# Patient Record
Sex: FEMALE | Race: WHITE | NOT HISPANIC OR LATINO | Employment: OTHER | URBAN - METROPOLITAN AREA
[De-identification: names, ages, dates, MRNs, and addresses within clinical notes are randomized per-mention and may not be internally consistent; named-entity substitution may affect disease eponyms.]

---

## 2017-02-23 ENCOUNTER — ANESTHESIA EVENT (OUTPATIENT)
Dept: PERIOP | Facility: HOSPITAL | Age: 67
DRG: 470 | End: 2017-02-23
Payer: COMMERCIAL

## 2017-02-23 ENCOUNTER — APPOINTMENT (OUTPATIENT)
Dept: LAB | Facility: HOSPITAL | Age: 67
End: 2017-02-23
Attending: ORTHOPAEDIC SURGERY
Payer: COMMERCIAL

## 2017-02-23 ENCOUNTER — APPOINTMENT (OUTPATIENT)
Dept: PREADMISSION TESTING | Facility: HOSPITAL | Age: 67
End: 2017-02-23
Payer: COMMERCIAL

## 2017-02-23 ENCOUNTER — TRANSCRIBE ORDERS (OUTPATIENT)
Dept: ADMINISTRATIVE | Facility: HOSPITAL | Age: 67
End: 2017-02-23

## 2017-02-23 ENCOUNTER — HOSPITAL ENCOUNTER (OUTPATIENT)
Dept: NON INVASIVE DIAGNOSTICS | Facility: HOSPITAL | Age: 67
Discharge: HOME | End: 2017-02-23
Attending: ORTHOPAEDIC SURGERY
Payer: COMMERCIAL

## 2017-02-23 ENCOUNTER — HOSPITAL ENCOUNTER (OUTPATIENT)
Dept: RADIOLOGY | Facility: HOSPITAL | Age: 67
Discharge: HOME | End: 2017-02-23
Attending: ORTHOPAEDIC SURGERY
Payer: COMMERCIAL

## 2017-02-23 VITALS
HEIGHT: 65 IN | WEIGHT: 189 LBS | SYSTOLIC BLOOD PRESSURE: 133 MMHG | DIASTOLIC BLOOD PRESSURE: 69 MMHG | BODY MASS INDEX: 31.49 KG/M2 | TEMPERATURE: 98 F | RESPIRATION RATE: 16 BRPM | HEART RATE: 72 BPM

## 2017-02-23 DIAGNOSIS — Z01.818 OTHER SPECIFIED PRE-OPERATIVE EXAMINATION: ICD-10-CM

## 2017-02-23 DIAGNOSIS — M17.12 PRIMARY OSTEOARTHRITIS OF LEFT KNEE: ICD-10-CM

## 2017-02-23 DIAGNOSIS — M17.12 PRIMARY OSTEOARTHRITIS OF LEFT KNEE: Primary | ICD-10-CM

## 2017-02-23 LAB
ABO GROUP BLD: NORMAL
ALBUMIN SERPL BCP-MCNC: 3.6 G/DL (ref 3.5–5)
ALP SERPL-CCNC: 121 U/L (ref 46–116)
ALT SERPL W P-5'-P-CCNC: 47 U/L (ref 12–78)
ANION GAP SERPL CALCULATED.3IONS-SCNC: 9 MMOL/L (ref 4–13)
AST SERPL W P-5'-P-CCNC: 19 U/L (ref 5–45)
BASOPHILS # BLD AUTO: 0.03 THOUSANDS/ΜL (ref 0–0.1)
BASOPHILS NFR BLD AUTO: 0 % (ref 0–1)
BILIRUB SERPL-MCNC: 0.58 MG/DL (ref 0.2–1)
BILIRUB UR QL STRIP: NEGATIVE
BLD GP AB SCN SERPL QL: NEGATIVE
BUN SERPL-MCNC: 15 MG/DL (ref 5–25)
CALCIUM SERPL-MCNC: 8.6 MG/DL (ref 8.3–10.1)
CHLORIDE SERPL-SCNC: 104 MMOL/L (ref 100–108)
CLARITY UR: CLEAR
CO2 SERPL-SCNC: 30 MMOL/L (ref 21–32)
COLOR UR: NORMAL
CREAT SERPL-MCNC: 0.77 MG/DL (ref 0.6–1.3)
EOSINOPHIL # BLD AUTO: 0.03 THOUSAND/ΜL (ref 0–0.61)
EOSINOPHIL NFR BLD AUTO: 0 % (ref 0–6)
ERYTHROCYTE [DISTWIDTH] IN BLOOD BY AUTOMATED COUNT: 14 % (ref 11.6–15.1)
GFR SERPL CREATININE-BSD FRML MDRD: >60 ML/MIN/1.73SQ M
GLUCOSE SERPL-MCNC: 103 MG/DL (ref 65–140)
GLUCOSE UR STRIP-MCNC: NEGATIVE MG/DL
HCT VFR BLD AUTO: 41.2 % (ref 34.8–46.1)
HGB BLD-MCNC: 14.4 G/DL (ref 11.5–15.4)
HGB UR QL STRIP.AUTO: NEGATIVE
INR PPP: 0.93 (ref 0.86–1.16)
KETONES UR STRIP-MCNC: NEGATIVE MG/DL
LEUKOCYTE ESTERASE UR QL STRIP: NEGATIVE
LYMPHOCYTES # BLD AUTO: 3.25 THOUSANDS/ΜL (ref 0.6–4.47)
LYMPHOCYTES NFR BLD AUTO: 25 % (ref 14–44)
MCH RBC QN AUTO: 33.3 PG (ref 26.8–34.3)
MCHC RBC AUTO-ENTMCNC: 35 G/DL (ref 31.4–37.4)
MCV RBC AUTO: 95 FL (ref 82–98)
MONOCYTES # BLD AUTO: 1.21 THOUSAND/ΜL (ref 0.17–1.22)
MONOCYTES NFR BLD AUTO: 9 % (ref 4–12)
NEUTROPHILS # BLD AUTO: 8.75 THOUSANDS/ΜL (ref 1.85–7.62)
NEUTS SEG NFR BLD AUTO: 66 % (ref 43–75)
NITRITE UR QL STRIP: NEGATIVE
NRBC BLD AUTO-RTO: 0 /100 WBCS
PH UR STRIP.AUTO: 6 [PH] (ref 4.5–8)
PLATELET # BLD AUTO: 262 THOUSANDS/UL (ref 149–390)
PMV BLD AUTO: 10.9 FL (ref 8.9–12.7)
POTASSIUM SERPL-SCNC: 3.5 MMOL/L (ref 3.5–5.3)
PROT SERPL-MCNC: 7 G/DL (ref 6.4–8.2)
PROT UR STRIP-MCNC: NEGATIVE MG/DL
PROTHROMBIN TIME: 12.5 SECONDS (ref 12–14.3)
RBC # BLD AUTO: 4.33 MILLION/UL (ref 3.81–5.12)
RH BLD: POSITIVE
SODIUM SERPL-SCNC: 143 MMOL/L (ref 136–145)
SP GR UR STRIP.AUTO: 1.01 (ref 1–1.03)
UROBILINOGEN UR QL STRIP.AUTO: 0.2 E.U./DL
WBC # BLD AUTO: 13.27 THOUSAND/UL (ref 4.31–10.16)

## 2017-02-23 PROCEDURE — 86900 BLOOD TYPING SEROLOGIC ABO: CPT

## 2017-02-23 PROCEDURE — 36415 COLL VENOUS BLD VENIPUNCTURE: CPT

## 2017-02-23 PROCEDURE — 86901 BLOOD TYPING SEROLOGIC RH(D): CPT

## 2017-02-23 PROCEDURE — 85610 PROTHROMBIN TIME: CPT

## 2017-02-23 PROCEDURE — 86850 RBC ANTIBODY SCREEN: CPT

## 2017-02-23 PROCEDURE — 93005 ELECTROCARDIOGRAM TRACING: CPT

## 2017-02-23 PROCEDURE — 81003 URINALYSIS AUTO W/O SCOPE: CPT | Performed by: ORTHOPAEDIC SURGERY

## 2017-02-23 PROCEDURE — 80053 COMPREHEN METABOLIC PANEL: CPT

## 2017-02-23 PROCEDURE — 85025 COMPLETE CBC W/AUTO DIFF WBC: CPT

## 2017-02-23 PROCEDURE — 71020 HB CHEST X-RAY 2VW FRONTAL&LATL: CPT

## 2017-02-23 RX ORDER — SODIUM CHLORIDE 9 MG/ML
125 INJECTION, SOLUTION INTRAVENOUS CONTINUOUS
Status: CANCELLED | OUTPATIENT
Start: 2017-02-23

## 2017-02-23 RX ORDER — AMLODIPINE BESYLATE 10 MG/1
10 TABLET ORAL DAILY
COMMUNITY
End: 2022-05-10 | Stop reason: HOSPADM

## 2017-02-24 LAB
ATRIAL RATE: 67 BPM
P AXIS: 35 DEGREES
PR INTERVAL: 140 MS
QRS AXIS: 5 DEGREES
QRSD INTERVAL: 78 MS
QT INTERVAL: 382 MS
QTC INTERVAL: 403 MS
T WAVE AXIS: 61 DEGREES
VENTRICULAR RATE: 67 BPM

## 2017-03-06 RX ORDER — TRANEXAMIC ACID 100 MG/ML
1000 INJECTION, SOLUTION INTRAVENOUS ONCE
Status: CANCELLED | OUTPATIENT
Start: 2017-03-06 | End: 2017-03-06

## 2017-03-07 ENCOUNTER — ANESTHESIA (OUTPATIENT)
Dept: PERIOP | Facility: HOSPITAL | Age: 67
DRG: 470 | End: 2017-03-07
Payer: COMMERCIAL

## 2017-03-07 ENCOUNTER — APPOINTMENT (INPATIENT)
Dept: RADIOLOGY | Facility: HOSPITAL | Age: 67
DRG: 470 | End: 2017-03-07
Payer: COMMERCIAL

## 2017-03-07 ENCOUNTER — HOSPITAL ENCOUNTER (INPATIENT)
Facility: HOSPITAL | Age: 67
LOS: 2 days | Discharge: NURSING, REHAB OR LONG-TERM CARE | DRG: 470 | End: 2017-03-09
Attending: ORTHOPAEDIC SURGERY | Admitting: ORTHOPAEDIC SURGERY
Payer: COMMERCIAL

## 2017-03-07 ENCOUNTER — APPOINTMENT (INPATIENT)
Dept: PHYSICAL THERAPY | Facility: HOSPITAL | Age: 67
DRG: 470 | End: 2017-03-07
Payer: COMMERCIAL

## 2017-03-07 DIAGNOSIS — M17.12 PRIMARY OSTEOARTHRITIS OF LEFT KNEE: Primary | ICD-10-CM

## 2017-03-07 LAB
GLUCOSE SERPL-MCNC: 101 MG/DL (ref 65–140)
GLUCOSE SERPL-MCNC: 116 MG/DL (ref 65–140)
GLUCOSE SERPL-MCNC: 126 MG/DL (ref 65–140)

## 2017-03-07 PROCEDURE — C1776 JOINT DEVICE (IMPLANTABLE): HCPCS | Performed by: ORTHOPAEDIC SURGERY

## 2017-03-07 PROCEDURE — 94762 N-INVAS EAR/PLS OXIMTRY CONT: CPT

## 2017-03-07 PROCEDURE — G8979 MOBILITY GOAL STATUS: HCPCS | Performed by: PHYSICAL THERAPIST

## 2017-03-07 PROCEDURE — 73560 X-RAY EXAM OF KNEE 1 OR 2: CPT

## 2017-03-07 PROCEDURE — 0SRD0J9 REPLACEMENT OF LEFT KNEE JOINT WITH SYNTHETIC SUBSTITUTE, CEMENTED, OPEN APPROACH: ICD-10-PCS | Performed by: ORTHOPAEDIC SURGERY

## 2017-03-07 PROCEDURE — C1713 ANCHOR/SCREW BN/BN,TIS/BN: HCPCS | Performed by: ORTHOPAEDIC SURGERY

## 2017-03-07 PROCEDURE — 97163 PT EVAL HIGH COMPLEX 45 MIN: CPT | Performed by: PHYSICAL THERAPIST

## 2017-03-07 PROCEDURE — G8978 MOBILITY CURRENT STATUS: HCPCS | Performed by: PHYSICAL THERAPIST

## 2017-03-07 PROCEDURE — 82948 REAGENT STRIP/BLOOD GLUCOSE: CPT

## 2017-03-07 DEVICE — ATTUNE KNEE SYSTEM TIBIAL INSERT FIXED BEARING POSTERIOR STABILIZED 5 5MM AOX
Type: IMPLANTABLE DEVICE | Site: KNEE | Status: FUNCTIONAL
Brand: ATTUNE

## 2017-03-07 DEVICE — SMARTSET GMV HIGH PERFORMANCE GENTAMICIN MEDIUM VISCOSITY BONE CEMENT 40G
Type: IMPLANTABLE DEVICE | Site: KNEE | Status: FUNCTIONAL
Brand: SMARTSET

## 2017-03-07 DEVICE — ATTUNE KNEE SYSTEM TIBIAL BASE FIXED BEARING SIZE 4 CEMENTED
Type: IMPLANTABLE DEVICE | Site: KNEE | Status: FUNCTIONAL
Brand: ATTUNE

## 2017-03-07 DEVICE — ATTUNE KNEE SYSTEM FEMORAL POSTERIOR STABILIZED SIZE 5 LEFT CEMENTED
Type: IMPLANTABLE DEVICE | Site: KNEE | Status: FUNCTIONAL
Brand: ATTUNE

## 2017-03-07 DEVICE — ATTUNE PATELLA MEDIALIZED ANATOMIC 35MM CEMENTED AOX
Type: IMPLANTABLE DEVICE | Site: KNEE | Status: FUNCTIONAL
Brand: ATTUNE

## 2017-03-07 RX ORDER — ONDANSETRON 2 MG/ML
4 INJECTION INTRAMUSCULAR; INTRAVENOUS EVERY 4 HOURS PRN
Status: DISPENSED | OUTPATIENT
Start: 2017-03-07 | End: 2017-03-08

## 2017-03-07 RX ORDER — DIPHENHYDRAMINE HYDROCHLORIDE 50 MG/ML
50 INJECTION INTRAMUSCULAR; INTRAVENOUS EVERY 6 HOURS PRN
Status: DISCONTINUED | OUTPATIENT
Start: 2017-03-07 | End: 2017-03-09 | Stop reason: HOSPADM

## 2017-03-07 RX ORDER — MORPHINE SULFATE 0.5 MG/ML
INJECTION, SOLUTION EPIDURAL; INTRATHECAL; INTRAVENOUS AS NEEDED
Status: DISCONTINUED | OUTPATIENT
Start: 2017-03-07 | End: 2017-03-07 | Stop reason: SURG

## 2017-03-07 RX ORDER — ONDANSETRON 2 MG/ML
INJECTION INTRAMUSCULAR; INTRAVENOUS AS NEEDED
Status: DISCONTINUED | OUTPATIENT
Start: 2017-03-07 | End: 2017-03-07 | Stop reason: SURG

## 2017-03-07 RX ORDER — OXYCODONE HYDROCHLORIDE AND ACETAMINOPHEN 5; 325 MG/1; MG/1
2 TABLET ORAL EVERY 4 HOURS PRN
Status: ACTIVE | OUTPATIENT
Start: 2017-03-07 | End: 2017-03-08

## 2017-03-07 RX ORDER — BISACODYL 10 MG
10 SUPPOSITORY, RECTAL RECTAL DAILY PRN
Status: DISCONTINUED | OUTPATIENT
Start: 2017-03-07 | End: 2017-03-09 | Stop reason: HOSPADM

## 2017-03-07 RX ORDER — DOCUSATE SODIUM 100 MG/1
100 CAPSULE, LIQUID FILLED ORAL 2 TIMES DAILY
Status: DISCONTINUED | OUTPATIENT
Start: 2017-03-07 | End: 2017-03-09 | Stop reason: HOSPADM

## 2017-03-07 RX ORDER — MAGNESIUM HYDROXIDE 1200 MG/15ML
LIQUID ORAL AS NEEDED
Status: DISCONTINUED | OUTPATIENT
Start: 2017-03-07 | End: 2017-03-07 | Stop reason: HOSPADM

## 2017-03-07 RX ORDER — CELECOXIB 200 MG/1
200 CAPSULE ORAL
Status: DISCONTINUED | OUTPATIENT
Start: 2017-03-07 | End: 2017-03-07 | Stop reason: HOSPADM

## 2017-03-07 RX ORDER — METHOCARBAMOL 500 MG/1
500 TABLET, FILM COATED ORAL EVERY 6 HOURS PRN
Status: DISCONTINUED | OUTPATIENT
Start: 2017-03-07 | End: 2017-03-09 | Stop reason: HOSPADM

## 2017-03-07 RX ORDER — SODIUM CHLORIDE 9 MG/ML
125 INJECTION, SOLUTION INTRAVENOUS CONTINUOUS
Status: DISCONTINUED | OUTPATIENT
Start: 2017-03-07 | End: 2017-03-09 | Stop reason: HOSPADM

## 2017-03-07 RX ORDER — ACETAMINOPHEN 325 MG/1
650 TABLET ORAL EVERY 6 HOURS PRN
Status: DISCONTINUED | OUTPATIENT
Start: 2017-03-07 | End: 2017-03-09 | Stop reason: HOSPADM

## 2017-03-07 RX ORDER — CELECOXIB 200 MG/1
200 CAPSULE ORAL DAILY
Status: DISCONTINUED | OUTPATIENT
Start: 2017-03-08 | End: 2017-03-09 | Stop reason: HOSPADM

## 2017-03-07 RX ORDER — OXYCODONE HYDROCHLORIDE 5 MG/1
5 TABLET ORAL EVERY 4 HOURS PRN
Status: DISCONTINUED | OUTPATIENT
Start: 2017-03-08 | End: 2017-03-09 | Stop reason: HOSPADM

## 2017-03-07 RX ORDER — ASPIRIN 325 MG
325 TABLET ORAL 2 TIMES DAILY
Status: DISCONTINUED | OUTPATIENT
Start: 2017-03-07 | End: 2017-03-09 | Stop reason: HOSPADM

## 2017-03-07 RX ORDER — ONDANSETRON 2 MG/ML
4 INJECTION INTRAMUSCULAR; INTRAVENOUS ONCE
Status: DISCONTINUED | OUTPATIENT
Start: 2017-03-07 | End: 2017-03-07 | Stop reason: HOSPADM

## 2017-03-07 RX ORDER — BUPIVACAINE HYDROCHLORIDE 7.5 MG/ML
INJECTION, SOLUTION INTRASPINAL AS NEEDED
Status: DISCONTINUED | OUTPATIENT
Start: 2017-03-07 | End: 2017-03-07 | Stop reason: SURG

## 2017-03-07 RX ORDER — METOCLOPRAMIDE HYDROCHLORIDE 5 MG/ML
5 INJECTION INTRAMUSCULAR; INTRAVENOUS EVERY 6 HOURS PRN
Status: ACTIVE | OUTPATIENT
Start: 2017-03-07 | End: 2017-03-08

## 2017-03-07 RX ORDER — TRAMADOL HYDROCHLORIDE 50 MG/1
50 TABLET ORAL EVERY 6 HOURS PRN
Status: DISCONTINUED | OUTPATIENT
Start: 2017-03-08 | End: 2017-03-09 | Stop reason: HOSPADM

## 2017-03-07 RX ORDER — VALACYCLOVIR HYDROCHLORIDE 500 MG/1
500 TABLET, FILM COATED ORAL 2 TIMES DAILY
Status: ON HOLD | COMMUNITY
End: 2017-03-07

## 2017-03-07 RX ORDER — ROPIVACAINE HYDROCHLORIDE 5 MG/ML
INJECTION, SOLUTION EPIDURAL; INFILTRATION; PERINEURAL AS NEEDED
Status: DISCONTINUED | OUTPATIENT
Start: 2017-03-07 | End: 2017-03-07 | Stop reason: SURG

## 2017-03-07 RX ORDER — ONDANSETRON 2 MG/ML
4 INJECTION INTRAMUSCULAR; INTRAVENOUS EVERY 8 HOURS PRN
Status: DISCONTINUED | OUTPATIENT
Start: 2017-03-07 | End: 2017-03-09 | Stop reason: HOSPADM

## 2017-03-07 RX ORDER — MIDAZOLAM HYDROCHLORIDE 1 MG/ML
INJECTION INTRAMUSCULAR; INTRAVENOUS AS NEEDED
Status: DISCONTINUED | OUTPATIENT
Start: 2017-03-07 | End: 2017-03-07 | Stop reason: SURG

## 2017-03-07 RX ORDER — ZOLPIDEM TARTRATE 5 MG/1
5 TABLET ORAL
Status: DISCONTINUED | OUTPATIENT
Start: 2017-03-07 | End: 2017-03-09 | Stop reason: HOSPADM

## 2017-03-07 RX ORDER — SODIUM CHLORIDE, SODIUM LACTATE, POTASSIUM CHLORIDE, CALCIUM CHLORIDE 600; 310; 30; 20 MG/100ML; MG/100ML; MG/100ML; MG/100ML
100 INJECTION, SOLUTION INTRAVENOUS CONTINUOUS
Status: DISCONTINUED | OUTPATIENT
Start: 2017-03-07 | End: 2017-03-09 | Stop reason: HOSPADM

## 2017-03-07 RX ORDER — PANTOPRAZOLE SODIUM 40 MG/1
40 TABLET, DELAYED RELEASE ORAL
Status: DISCONTINUED | OUTPATIENT
Start: 2017-03-08 | End: 2017-03-09 | Stop reason: HOSPADM

## 2017-03-07 RX ORDER — PROPOFOL 10 MG/ML
INJECTION, EMULSION INTRAVENOUS CONTINUOUS PRN
Status: DISCONTINUED | OUTPATIENT
Start: 2017-03-07 | End: 2017-03-07 | Stop reason: SURG

## 2017-03-07 RX ORDER — EPHEDRINE SULFATE 50 MG/ML
INJECTION, SOLUTION INTRAVENOUS AS NEEDED
Status: DISCONTINUED | OUTPATIENT
Start: 2017-03-07 | End: 2017-03-07 | Stop reason: SURG

## 2017-03-07 RX ORDER — CALCIUM CARBONATE 200(500)MG
1000 TABLET,CHEWABLE ORAL DAILY PRN
Status: DISCONTINUED | OUTPATIENT
Start: 2017-03-07 | End: 2017-03-09 | Stop reason: HOSPADM

## 2017-03-07 RX ORDER — TRANEXAMIC ACID 100 MG/ML
INJECTION, SOLUTION INTRAVENOUS AS NEEDED
Status: DISCONTINUED | OUTPATIENT
Start: 2017-03-07 | End: 2017-03-07 | Stop reason: HOSPADM

## 2017-03-07 RX ORDER — OXYCODONE HYDROCHLORIDE 10 MG/1
10 TABLET ORAL EVERY 4 HOURS PRN
Status: DISCONTINUED | OUTPATIENT
Start: 2017-03-08 | End: 2017-03-09 | Stop reason: HOSPADM

## 2017-03-07 RX ORDER — FUROSEMIDE 20 MG/1
20 TABLET ORAL DAILY
Status: DISCONTINUED | OUTPATIENT
Start: 2017-03-08 | End: 2017-03-09 | Stop reason: HOSPADM

## 2017-03-07 RX ORDER — ATORVASTATIN CALCIUM 10 MG/1
10 TABLET, FILM COATED ORAL
Status: DISCONTINUED | OUTPATIENT
Start: 2017-03-08 | End: 2017-03-09 | Stop reason: HOSPADM

## 2017-03-07 RX ORDER — NALOXONE HYDROCHLORIDE 0.4 MG/ML
0.04 INJECTION, SOLUTION INTRAMUSCULAR; INTRAVENOUS; SUBCUTANEOUS AS NEEDED
Status: DISCONTINUED | OUTPATIENT
Start: 2017-03-08 | End: 2017-03-09 | Stop reason: HOSPADM

## 2017-03-07 RX ORDER — DIPHENHYDRAMINE HYDROCHLORIDE 50 MG/ML
25 INJECTION INTRAMUSCULAR; INTRAVENOUS EVERY 6 HOURS PRN
Status: DISPENSED | OUTPATIENT
Start: 2017-03-07 | End: 2017-03-08

## 2017-03-07 RX ORDER — FENTANYL CITRATE 50 UG/ML
INJECTION, SOLUTION INTRAMUSCULAR; INTRAVENOUS AS NEEDED
Status: DISCONTINUED | OUTPATIENT
Start: 2017-03-07 | End: 2017-03-07 | Stop reason: SURG

## 2017-03-07 RX ORDER — AMLODIPINE BESYLATE 10 MG/1
10 TABLET ORAL DAILY
Status: DISCONTINUED | OUTPATIENT
Start: 2017-03-08 | End: 2017-03-09 | Stop reason: HOSPADM

## 2017-03-07 RX ORDER — MORPHINE SULFATE 2 MG/ML
2 INJECTION, SOLUTION INTRAMUSCULAR; INTRAVENOUS EVERY 2 HOUR PRN
Status: DISCONTINUED | OUTPATIENT
Start: 2017-03-08 | End: 2017-03-09 | Stop reason: HOSPADM

## 2017-03-07 RX ORDER — NALBUPHINE HCL 10 MG/ML
5 AMPUL (ML) INJECTION
Status: DISPENSED | OUTPATIENT
Start: 2017-03-07 | End: 2017-03-08

## 2017-03-07 RX ORDER — KETOROLAC TROMETHAMINE 30 MG/ML
15 INJECTION, SOLUTION INTRAMUSCULAR; INTRAVENOUS EVERY 6 HOURS PRN
Status: DISPENSED | OUTPATIENT
Start: 2017-03-07 | End: 2017-03-09

## 2017-03-07 RX ADMIN — SODIUM CHLORIDE: 0.9 INJECTION, SOLUTION INTRAVENOUS at 09:27

## 2017-03-07 RX ADMIN — SODIUM CHLORIDE 125 ML/HR: 0.9 INJECTION, SOLUTION INTRAVENOUS at 08:25

## 2017-03-07 RX ADMIN — ONDANSETRON 4 MG: 2 INJECTION INTRAMUSCULAR; INTRAVENOUS at 20:40

## 2017-03-07 RX ADMIN — FENTANYL CITRATE 100 MCG: 50 INJECTION, SOLUTION INTRAMUSCULAR; INTRAVENOUS at 08:39

## 2017-03-07 RX ADMIN — EPHEDRINE SULFATE 5 MG: 50 INJECTION, SOLUTION INTRAMUSCULAR; INTRAVENOUS; SUBCUTANEOUS at 09:45

## 2017-03-07 RX ADMIN — EPHEDRINE SULFATE 5 MG: 50 INJECTION, SOLUTION INTRAMUSCULAR; INTRAVENOUS; SUBCUTANEOUS at 09:55

## 2017-03-07 RX ADMIN — CEFAZOLIN SODIUM 2000 MG: 2 SOLUTION INTRAVENOUS at 09:01

## 2017-03-07 RX ADMIN — ROPIVACAINE HYDROCHLORIDE 30 ML: 5 INJECTION, SOLUTION EPIDURAL; INFILTRATION; PERINEURAL at 08:46

## 2017-03-07 RX ADMIN — PROPOFOL 80 MCG/KG/MIN: 10 INJECTION, EMULSION INTRAVENOUS at 09:01

## 2017-03-07 RX ADMIN — MIDAZOLAM HYDROCHLORIDE 2 MG: 1 INJECTION, SOLUTION INTRAMUSCULAR; INTRAVENOUS at 08:39

## 2017-03-07 RX ADMIN — DEXAMETHASONE SODIUM PHOSPHATE 4 MG: 10 INJECTION INTRAMUSCULAR; INTRAVENOUS at 09:00

## 2017-03-07 RX ADMIN — CEFAZOLIN SODIUM 1000 MG: 1 SOLUTION INTRAVENOUS at 19:11

## 2017-03-07 RX ADMIN — MIDAZOLAM HYDROCHLORIDE 2 MG: 1 INJECTION, SOLUTION INTRAMUSCULAR; INTRAVENOUS at 08:40

## 2017-03-07 RX ADMIN — SODIUM CHLORIDE, SODIUM LACTATE, POTASSIUM CHLORIDE, AND CALCIUM CHLORIDE 100 ML/HR: .6; .31; .03; .02 INJECTION, SOLUTION INTRAVENOUS at 15:29

## 2017-03-07 RX ADMIN — BUPIVACAINE HYDROCHLORIDE IN DEXTROSE 1.6 ML: 7.5 INJECTION, SOLUTION SUBARACHNOID at 09:00

## 2017-03-07 RX ADMIN — ONDANSETRON 4 MG: 2 INJECTION INTRAMUSCULAR; INTRAVENOUS at 13:45

## 2017-03-07 RX ADMIN — METHOCARBAMOL 500 MG: 500 TABLET ORAL at 19:18

## 2017-03-07 RX ADMIN — SODIUM CHLORIDE: 0.9 INJECTION, SOLUTION INTRAVENOUS at 10:06

## 2017-03-07 RX ADMIN — MORPHINE SULFATE 0.15 MG: 0.5 INJECTION, SOLUTION EPIDURAL; INTRATHECAL; INTRAVENOUS at 09:00

## 2017-03-07 RX ADMIN — SODIUM CHLORIDE, SODIUM LACTATE, POTASSIUM CHLORIDE, AND CALCIUM CHLORIDE 100 ML/HR: .6; .31; .03; .02 INJECTION, SOLUTION INTRAVENOUS at 23:27

## 2017-03-07 RX ADMIN — DOCUSATE SODIUM 100 MG: 100 CAPSULE, LIQUID FILLED ORAL at 19:11

## 2017-03-07 RX ADMIN — EPHEDRINE SULFATE 10 MG: 50 INJECTION, SOLUTION INTRAMUSCULAR; INTRAVENOUS; SUBCUTANEOUS at 10:10

## 2017-03-07 RX ADMIN — CELECOXIB 200 MG: 200 CAPSULE ORAL at 07:41

## 2017-03-07 RX ADMIN — EPHEDRINE SULFATE 10 MG: 50 INJECTION, SOLUTION INTRAMUSCULAR; INTRAVENOUS; SUBCUTANEOUS at 10:04

## 2017-03-07 RX ADMIN — ONDANSETRON HYDROCHLORIDE 4 MG: 2 INJECTION, SOLUTION INTRAVENOUS at 09:00

## 2017-03-07 RX ADMIN — DIPHENHYDRAMINE HYDROCHLORIDE 25 MG: 50 INJECTION, SOLUTION INTRAMUSCULAR; INTRAVENOUS at 14:33

## 2017-03-08 ENCOUNTER — APPOINTMENT (INPATIENT)
Dept: PHYSICAL THERAPY | Facility: HOSPITAL | Age: 67
DRG: 470 | End: 2017-03-08
Payer: COMMERCIAL

## 2017-03-08 ENCOUNTER — APPOINTMENT (INPATIENT)
Dept: OCCUPATIONAL THERAPY | Facility: HOSPITAL | Age: 67
DRG: 470 | End: 2017-03-08
Payer: COMMERCIAL

## 2017-03-08 LAB
ERYTHROCYTE [DISTWIDTH] IN BLOOD BY AUTOMATED COUNT: 14.6 % (ref 11.6–15.1)
EST. AVERAGE GLUCOSE BLD GHB EST-MCNC: 146 MG/DL
GLUCOSE SERPL-MCNC: 109 MG/DL (ref 65–140)
GLUCOSE SERPL-MCNC: 129 MG/DL (ref 65–140)
GLUCOSE SERPL-MCNC: 195 MG/DL (ref 65–140)
GLUCOSE SERPL-MCNC: 94 MG/DL (ref 65–140)
HBA1C MFR BLD: 6.7 % (ref 4.2–6.3)
HCT VFR BLD AUTO: 37.3 % (ref 34.8–46.1)
HGB BLD-MCNC: 12.2 G/DL (ref 11.5–15.4)
MCH RBC QN AUTO: 32.4 PG (ref 26.8–34.3)
MCHC RBC AUTO-ENTMCNC: 32.7 G/DL (ref 31.4–37.4)
MCV RBC AUTO: 99 FL (ref 82–98)
PLATELET # BLD AUTO: 195 THOUSANDS/UL (ref 149–390)
PMV BLD AUTO: 10.6 FL (ref 8.9–12.7)
RBC # BLD AUTO: 3.76 MILLION/UL (ref 3.81–5.12)
WBC # BLD AUTO: 9.05 THOUSAND/UL (ref 4.31–10.16)

## 2017-03-08 PROCEDURE — 82948 REAGENT STRIP/BLOOD GLUCOSE: CPT

## 2017-03-08 PROCEDURE — 97165 OT EVAL LOW COMPLEX 30 MIN: CPT

## 2017-03-08 PROCEDURE — G8988 SELF CARE GOAL STATUS: HCPCS

## 2017-03-08 PROCEDURE — 97116 GAIT TRAINING THERAPY: CPT

## 2017-03-08 PROCEDURE — 85027 COMPLETE CBC AUTOMATED: CPT | Performed by: PHYSICIAN ASSISTANT

## 2017-03-08 PROCEDURE — 83036 HEMOGLOBIN GLYCOSYLATED A1C: CPT | Performed by: INTERNAL MEDICINE

## 2017-03-08 PROCEDURE — 97110 THERAPEUTIC EXERCISES: CPT

## 2017-03-08 PROCEDURE — G8987 SELF CARE CURRENT STATUS: HCPCS

## 2017-03-08 RX ORDER — ACETAMINOPHEN 325 MG/1
650 TABLET ORAL EVERY 6 HOURS PRN
Qty: 30 TABLET | Refills: 0
Start: 2017-03-08 | End: 2022-05-10 | Stop reason: HOSPADM

## 2017-03-08 RX ORDER — DOCUSATE SODIUM 100 MG/1
100 CAPSULE, LIQUID FILLED ORAL 2 TIMES DAILY PRN
Qty: 60 CAPSULE | Refills: 0
Start: 2017-03-08 | End: 2022-05-10 | Stop reason: HOSPADM

## 2017-03-08 RX ORDER — OXYCODONE HYDROCHLORIDE 5 MG/1
5-10 TABLET ORAL EVERY 4 HOURS PRN
Qty: 30 TABLET | Refills: 0
Start: 2017-03-08 | End: 2022-05-26

## 2017-03-08 RX ORDER — CELECOXIB 200 MG/1
200 CAPSULE ORAL DAILY
Qty: 30 CAPSULE | Refills: 0
Start: 2017-03-08 | End: 2022-05-10 | Stop reason: HOSPADM

## 2017-03-08 RX ORDER — ASPIRIN 325 MG
325 TABLET ORAL 2 TIMES DAILY
Qty: 60 TABLET | Refills: 0
Start: 2017-03-08 | End: 2020-04-30 | Stop reason: ALTCHOICE

## 2017-03-08 RX ORDER — TRAMADOL HYDROCHLORIDE 50 MG/1
50 TABLET ORAL EVERY 6 HOURS PRN
Qty: 30 TABLET | Refills: 0
Start: 2017-03-08 | End: 2022-05-10 | Stop reason: HOSPADM

## 2017-03-08 RX ADMIN — Medication 1000 MG: at 14:00

## 2017-03-08 RX ADMIN — Medication 1 TABLET: at 08:14

## 2017-03-08 RX ADMIN — KETOROLAC TROMETHAMINE 15 MG: 30 INJECTION, SOLUTION INTRAMUSCULAR at 01:59

## 2017-03-08 RX ADMIN — ASPIRIN 325 MG: 325 TABLET ORAL at 18:39

## 2017-03-08 RX ADMIN — KETOROLAC TROMETHAMINE 15 MG: 30 INJECTION, SOLUTION INTRAMUSCULAR at 14:01

## 2017-03-08 RX ADMIN — KETOROLAC TROMETHAMINE 15 MG: 30 INJECTION, SOLUTION INTRAMUSCULAR at 08:15

## 2017-03-08 RX ADMIN — METFORMIN HYDROCHLORIDE 500 MG: 500 TABLET, FILM COATED ORAL at 08:14

## 2017-03-08 RX ADMIN — DOCUSATE SODIUM 100 MG: 100 CAPSULE, LIQUID FILLED ORAL at 08:14

## 2017-03-08 RX ADMIN — INSULIN LISPRO 2 UNITS: 100 INJECTION, SOLUTION INTRAVENOUS; SUBCUTANEOUS at 18:35

## 2017-03-08 RX ADMIN — FUROSEMIDE 20 MG: 20 TABLET ORAL at 08:14

## 2017-03-08 RX ADMIN — DOCUSATE SODIUM 100 MG: 100 CAPSULE, LIQUID FILLED ORAL at 18:37

## 2017-03-08 RX ADMIN — OXYCODONE HYDROCHLORIDE 5 MG: 5 TABLET ORAL at 20:01

## 2017-03-08 RX ADMIN — ASPIRIN 325 MG: 325 TABLET ORAL at 08:14

## 2017-03-08 RX ADMIN — METFORMIN HYDROCHLORIDE 500 MG: 500 TABLET, FILM COATED ORAL at 18:37

## 2017-03-08 RX ADMIN — PANTOPRAZOLE SODIUM 40 MG: 40 TABLET, DELAYED RELEASE ORAL at 05:09

## 2017-03-08 RX ADMIN — AMLODIPINE BESYLATE 10 MG: 10 TABLET ORAL at 08:15

## 2017-03-08 RX ADMIN — ATORVASTATIN CALCIUM 10 MG: 10 TABLET, FILM COATED ORAL at 18:37

## 2017-03-08 RX ADMIN — CEFAZOLIN SODIUM 1000 MG: 1 SOLUTION INTRAVENOUS at 01:47

## 2017-03-08 RX ADMIN — CELECOXIB 200 MG: 200 CAPSULE ORAL at 08:14

## 2017-03-09 ENCOUNTER — APPOINTMENT (INPATIENT)
Dept: PHYSICAL THERAPY | Facility: HOSPITAL | Age: 67
DRG: 470 | End: 2017-03-09
Payer: COMMERCIAL

## 2017-03-09 ENCOUNTER — APPOINTMENT (INPATIENT)
Dept: OCCUPATIONAL THERAPY | Facility: HOSPITAL | Age: 67
DRG: 470 | End: 2017-03-09
Payer: COMMERCIAL

## 2017-03-09 VITALS
DIASTOLIC BLOOD PRESSURE: 82 MMHG | BODY MASS INDEX: 31.49 KG/M2 | TEMPERATURE: 98.6 F | HEIGHT: 65 IN | WEIGHT: 189 LBS | RESPIRATION RATE: 18 BRPM | OXYGEN SATURATION: 96 % | HEART RATE: 76 BPM | SYSTOLIC BLOOD PRESSURE: 122 MMHG

## 2017-03-09 LAB
GLUCOSE SERPL-MCNC: 110 MG/DL (ref 65–140)
GLUCOSE SERPL-MCNC: 119 MG/DL (ref 65–140)
GLUCOSE SERPL-MCNC: 141 MG/DL (ref 65–140)

## 2017-03-09 PROCEDURE — 97116 GAIT TRAINING THERAPY: CPT

## 2017-03-09 PROCEDURE — 82948 REAGENT STRIP/BLOOD GLUCOSE: CPT

## 2017-03-09 PROCEDURE — 97530 THERAPEUTIC ACTIVITIES: CPT

## 2017-03-09 RX ADMIN — METHOCARBAMOL 500 MG: 500 TABLET ORAL at 14:36

## 2017-03-09 RX ADMIN — OXYCODONE HYDROCHLORIDE 10 MG: 10 TABLET ORAL at 02:51

## 2017-03-09 RX ADMIN — ASPIRIN 325 MG: 325 TABLET ORAL at 09:33

## 2017-03-09 RX ADMIN — Medication 1 TABLET: at 09:33

## 2017-03-09 RX ADMIN — FUROSEMIDE 20 MG: 20 TABLET ORAL at 09:33

## 2017-03-09 RX ADMIN — PANTOPRAZOLE SODIUM 40 MG: 40 TABLET, DELAYED RELEASE ORAL at 07:24

## 2017-03-09 RX ADMIN — AMLODIPINE BESYLATE 10 MG: 10 TABLET ORAL at 09:34

## 2017-03-09 RX ADMIN — OXYCODONE HYDROCHLORIDE 10 MG: 10 TABLET ORAL at 14:35

## 2017-03-09 RX ADMIN — CELECOXIB 200 MG: 200 CAPSULE ORAL at 09:33

## 2017-03-09 RX ADMIN — METFORMIN HYDROCHLORIDE 500 MG: 500 TABLET, FILM COATED ORAL at 09:33

## 2017-03-09 RX ADMIN — OXYCODONE HYDROCHLORIDE 10 MG: 10 TABLET ORAL at 09:33

## 2017-04-06 ENCOUNTER — TRANSCRIBE ORDERS (OUTPATIENT)
Dept: ADMINISTRATIVE | Facility: HOSPITAL | Age: 67
End: 2017-04-06

## 2017-04-06 ENCOUNTER — HOSPITAL ENCOUNTER (OUTPATIENT)
Dept: RADIOLOGY | Facility: HOSPITAL | Age: 67
Discharge: HOME | End: 2017-04-06
Payer: COMMERCIAL

## 2017-04-06 DIAGNOSIS — R10.9 ABDOMINAL DISCOMFORT: ICD-10-CM

## 2017-04-06 DIAGNOSIS — K59.00 CONSTIPATION, UNSPECIFIED CONSTIPATION TYPE: Primary | ICD-10-CM

## 2017-04-06 DIAGNOSIS — K59.00 CONSTIPATION, UNSPECIFIED CONSTIPATION TYPE: ICD-10-CM

## 2017-04-06 PROCEDURE — 74000 HB X-RAY EXAM OF ABDOMEN (SINGLE ANTEROPOSTERIOR VIEW): CPT

## 2018-02-16 ENCOUNTER — TRANSCRIBE ORDERS (OUTPATIENT)
Dept: ADMINISTRATIVE | Facility: HOSPITAL | Age: 68
End: 2018-02-16

## 2018-02-16 ENCOUNTER — TELEPHONE (OUTPATIENT)
Dept: ADMINISTRATIVE | Facility: HOSPITAL | Age: 68
End: 2018-02-16

## 2018-02-16 DIAGNOSIS — I65.23 BILATERAL CAROTID ARTERY STENOSIS: Primary | ICD-10-CM

## 2018-02-16 DIAGNOSIS — I70.0 AORTIC ATHEROSCLEROSIS (HCC): Primary | ICD-10-CM

## 2018-02-16 NOTE — TELEPHONE ENCOUNTER
Called patient today to sched Carotid and Abdominal Aorta  Spoke w/ Patient at the moment of call she was busy she stated if office can call her back this afternoon  Informed patient iof she does not get a call if she can call office to schedule patient verbalized understanding

## 2018-02-19 NOTE — TELEPHONE ENCOUNTER
As per Hieu Dias patient returned call regarding message left on machine  Returned call to patient LVM to return  Call to office

## 2018-03-21 ENCOUNTER — HOSPITAL ENCOUNTER (OUTPATIENT)
Dept: NON INVASIVE DIAGNOSTICS | Facility: CLINIC | Age: 68
Discharge: HOME | End: 2018-03-21
Payer: COMMERCIAL

## 2018-03-21 DIAGNOSIS — I65.23 BILATERAL CAROTID ARTERY STENOSIS: ICD-10-CM

## 2018-03-21 DIAGNOSIS — I74.09 AORTOILIAC OCCLUSIVE DISEASE (HCC): ICD-10-CM

## 2018-03-21 DIAGNOSIS — I65.23 CAROTID ARTERY STENOSIS, ASYMPTOMATIC, BILATERAL: ICD-10-CM

## 2018-03-21 DIAGNOSIS — I70.0 AORTIC ATHEROSCLEROSIS (HCC): ICD-10-CM

## 2018-03-21 PROCEDURE — 93978 VASCULAR STUDY: CPT | Performed by: SURGERY

## 2018-03-21 PROCEDURE — 93923 UPR/LXTR ART STDY 3+ LVLS: CPT

## 2018-03-21 PROCEDURE — 93923 UPR/LXTR ART STDY 3+ LVLS: CPT | Performed by: SURGERY

## 2018-03-21 PROCEDURE — 93978 VASCULAR STUDY: CPT

## 2018-03-21 PROCEDURE — 93880 EXTRACRANIAL BILAT STUDY: CPT | Performed by: SURGERY

## 2018-03-21 PROCEDURE — 93880 EXTRACRANIAL BILAT STUDY: CPT

## 2019-03-13 ENCOUNTER — TELEPHONE (OUTPATIENT)
Dept: VASCULAR SURGERY | Facility: CLINIC | Age: 69
End: 2019-03-13

## 2019-03-13 NOTE — TELEPHONE ENCOUNTER
Received call from pt to schedule her dopplers  She said she has been having "dizzy spells" occasionally, which she has seen her PCP for  She states her PCP ordered "a nasal spray for congestion" but it has not helped  Scheduling offered to move her carotid doppler up to tomorrow but pt refused because of her schedule  She will have the doppler 3/22  I instructed her to f/u w/ her PCP in the meantime and if it happens again she should go to ED to be evaluated  Pt verbalized understanding

## 2019-03-14 ENCOUNTER — HOSPITAL ENCOUNTER (OUTPATIENT)
Dept: NON INVASIVE DIAGNOSTICS | Facility: HOSPITAL | Age: 69
Discharge: HOME | End: 2019-03-14
Attending: SURGERY
Payer: COMMERCIAL

## 2019-03-14 DIAGNOSIS — I65.23 CAROTID ARTERY STENOSIS, ASYMPTOMATIC, BILATERAL: ICD-10-CM

## 2019-03-14 DIAGNOSIS — I65.23 BILATERAL CAROTID ARTERY STENOSIS: ICD-10-CM

## 2019-03-14 PROCEDURE — 93880 EXTRACRANIAL BILAT STUDY: CPT

## 2019-03-15 PROCEDURE — 93880 EXTRACRANIAL BILAT STUDY: CPT | Performed by: SURGERY

## 2019-03-28 DIAGNOSIS — I70.0 ATHEROSCLEROSIS OF AORTA (HCC): Primary | ICD-10-CM

## 2019-04-05 ENCOUNTER — HOSPITAL ENCOUNTER (OUTPATIENT)
Dept: NON INVASIVE DIAGNOSTICS | Facility: CLINIC | Age: 69
Discharge: HOME | End: 2019-04-05
Payer: COMMERCIAL

## 2019-04-05 DIAGNOSIS — I70.0 AORTIC ATHEROSCLEROSIS (HCC): ICD-10-CM

## 2019-04-05 DIAGNOSIS — I74.09 AORTOILIAC OCCLUSIVE DISEASE (HCC): ICD-10-CM

## 2019-04-05 PROCEDURE — 93978 VASCULAR STUDY: CPT

## 2019-04-05 PROCEDURE — 93923 UPR/LXTR ART STDY 3+ LVLS: CPT

## 2019-04-08 PROCEDURE — 93978 VASCULAR STUDY: CPT | Performed by: SURGERY

## 2019-04-08 PROCEDURE — 93923 UPR/LXTR ART STDY 3+ LVLS: CPT | Performed by: SURGERY

## 2019-04-19 ENCOUNTER — TRANSCRIBE ORDERS (OUTPATIENT)
Dept: ADMINISTRATIVE | Facility: HOSPITAL | Age: 69
End: 2019-04-19

## 2019-04-19 ENCOUNTER — HOSPITAL ENCOUNTER (OUTPATIENT)
Dept: RADIOLOGY | Facility: HOSPITAL | Age: 69
Discharge: HOME | End: 2019-04-19
Payer: COMMERCIAL

## 2019-04-19 DIAGNOSIS — M54.41 CHRONIC BILATERAL LOW BACK PAIN WITH BILATERAL SCIATICA: Primary | ICD-10-CM

## 2019-04-19 DIAGNOSIS — M25.552 ACUTE PAIN OF LEFT HIP: ICD-10-CM

## 2019-04-19 DIAGNOSIS — M54.41 CHRONIC BILATERAL LOW BACK PAIN WITH BILATERAL SCIATICA: ICD-10-CM

## 2019-04-19 DIAGNOSIS — M54.42 CHRONIC BILATERAL LOW BACK PAIN WITH BILATERAL SCIATICA: ICD-10-CM

## 2019-04-19 DIAGNOSIS — G89.29 CHRONIC BILATERAL LOW BACK PAIN WITH BILATERAL SCIATICA: ICD-10-CM

## 2019-04-19 DIAGNOSIS — G89.29 CHRONIC BILATERAL LOW BACK PAIN WITH BILATERAL SCIATICA: Primary | ICD-10-CM

## 2019-04-19 DIAGNOSIS — M54.42 CHRONIC BILATERAL LOW BACK PAIN WITH BILATERAL SCIATICA: Primary | ICD-10-CM

## 2019-04-19 PROCEDURE — 73522 X-RAY EXAM HIPS BI 3-4 VIEWS: CPT

## 2019-04-19 PROCEDURE — 72110 X-RAY EXAM L-2 SPINE 4/>VWS: CPT

## 2020-03-02 DIAGNOSIS — I65.23 CAROTID STENOSIS, BILATERAL: ICD-10-CM

## 2020-03-02 DIAGNOSIS — I71.4 ABDOMINAL AORTIC ANEURYSM WITHOUT RUPTURE (HCC): Primary | ICD-10-CM

## 2020-04-02 ENCOUNTER — HOSPITAL ENCOUNTER (OUTPATIENT)
Dept: NON INVASIVE DIAGNOSTICS | Facility: CLINIC | Age: 70
Discharge: HOME | End: 2020-04-02
Payer: COMMERCIAL

## 2020-04-02 DIAGNOSIS — I65.23 CAROTID STENOSIS, BILATERAL: ICD-10-CM

## 2020-04-02 PROCEDURE — 93880 EXTRACRANIAL BILAT STUDY: CPT | Performed by: SURGERY

## 2020-04-02 PROCEDURE — 93880 EXTRACRANIAL BILAT STUDY: CPT

## 2020-04-30 ENCOUNTER — TELEMEDICINE (OUTPATIENT)
Dept: VASCULAR SURGERY | Facility: CLINIC | Age: 70
End: 2020-04-30
Payer: COMMERCIAL

## 2020-04-30 DIAGNOSIS — I77.1 STENOSIS OF LEFT SUBCLAVIAN ARTERY (HCC): ICD-10-CM

## 2020-04-30 DIAGNOSIS — I73.9 PAD (PERIPHERAL ARTERY DISEASE) (HCC): ICD-10-CM

## 2020-04-30 DIAGNOSIS — I65.23 CAROTID STENOSIS, ASYMPTOMATIC, BILATERAL: Primary | ICD-10-CM

## 2020-04-30 PROBLEM — E11.9 TYPE 2 DIABETES MELLITUS WITHOUT COMPLICATION, WITHOUT LONG-TERM CURRENT USE OF INSULIN (HCC): Status: ACTIVE | Noted: 2019-01-14

## 2020-04-30 PROBLEM — E11.9 DIABETES MELLITUS (HCC): Status: ACTIVE | Noted: 2019-10-29

## 2020-04-30 PROCEDURE — 99441 PR PHYS/QHP TELEPHONE EVALUATION 5-10 MIN: CPT | Performed by: NURSE PRACTITIONER

## 2020-04-30 RX ORDER — RANITIDINE 75 MG/1
TABLET, FILM COATED ORAL
COMMUNITY
Start: 2020-04-26 | End: 2022-05-26

## 2020-04-30 RX ORDER — MECLIZINE HYDROCHLORIDE 25 MG/1
25 TABLET ORAL DAILY
COMMUNITY
Start: 2020-04-24 | End: 2022-06-01

## 2020-04-30 RX ORDER — MELOXICAM 7.5 MG/1
7.5 TABLET ORAL DAILY
COMMUNITY
Start: 2020-04-03 | End: 2022-05-26

## 2020-04-30 RX ORDER — ASPIRIN 81 MG/1
81 TABLET ORAL DAILY
Qty: 90 TABLET | Refills: 0 | Status: SHIPPED | OUTPATIENT
Start: 2020-04-30 | End: 2022-05-26

## 2020-05-06 PROBLEM — I77.1 STENOSIS OF LEFT SUBCLAVIAN ARTERY (HCC): Status: ACTIVE | Noted: 2020-05-06

## 2020-05-06 PROBLEM — I73.9 PAD (PERIPHERAL ARTERY DISEASE) (HCC): Status: ACTIVE | Noted: 2020-05-06

## 2020-05-06 PROBLEM — I65.23 CAROTID STENOSIS, ASYMPTOMATIC, BILATERAL: Status: ACTIVE | Noted: 2020-05-06

## 2020-06-25 ENCOUNTER — TRANSCRIBE ORDERS (OUTPATIENT)
Dept: ADMINISTRATIVE | Facility: HOSPITAL | Age: 70
End: 2020-06-25

## 2020-06-25 ENCOUNTER — HOSPITAL ENCOUNTER (OUTPATIENT)
Dept: RADIOLOGY | Facility: HOSPITAL | Age: 70
Discharge: HOME | End: 2020-06-25
Payer: COMMERCIAL

## 2020-06-25 DIAGNOSIS — M25.552 LEFT HIP PAIN: Primary | ICD-10-CM

## 2020-06-25 DIAGNOSIS — M54.50 LOW BACK PAIN, UNSPECIFIED BACK PAIN LATERALITY, UNSPECIFIED CHRONICITY, UNSPECIFIED WHETHER SCIATICA PRESENT: ICD-10-CM

## 2020-06-25 DIAGNOSIS — M25.552 LEFT HIP PAIN: ICD-10-CM

## 2020-06-25 DIAGNOSIS — M70.62 TROCHANTERIC BURSITIS OF LEFT HIP: ICD-10-CM

## 2020-06-25 PROCEDURE — 72110 X-RAY EXAM L-2 SPINE 4/>VWS: CPT

## 2020-06-25 PROCEDURE — 73502 X-RAY EXAM HIP UNI 2-3 VIEWS: CPT

## 2020-07-09 ENCOUNTER — HOSPITAL ENCOUNTER (OUTPATIENT)
Dept: NON INVASIVE DIAGNOSTICS | Facility: CLINIC | Age: 70
Discharge: HOME | End: 2020-07-09
Payer: COMMERCIAL

## 2020-07-09 DIAGNOSIS — I71.4 ABDOMINAL AORTIC ANEURYSM WITHOUT RUPTURE (HCC): ICD-10-CM

## 2020-07-09 PROCEDURE — 93978 VASCULAR STUDY: CPT

## 2020-07-09 PROCEDURE — 93978 VASCULAR STUDY: CPT | Performed by: SURGERY

## 2020-07-10 ENCOUNTER — TELEPHONE (OUTPATIENT)
Dept: VASCULAR SURGERY | Facility: CLINIC | Age: 70
End: 2020-07-10

## 2020-07-10 NOTE — TELEPHONE ENCOUNTER
----- Message from Mary Pabon MD sent at 7/10/2020 11:12 AM EDT -----  Please contact pt to see if any progression in her claudication symptoms and if yes make OV appt with VS and if no repeat Ao/derek in 6m  ----- Message -----  From: Willy Chavez MA  Sent: 7/10/2020   9:55 AM EDT  To: Mary Pabon MD    Task study to Ginny Maza

## 2020-07-10 NOTE — TELEPHONE ENCOUNTER
Called patient at this time to inquire about progression of claudication symptoms  Patient states that she has been having an increase in left lower leg pain, especially in the calf  States that she can walk about a block before having to rest  Patient also states that she recently starting riding her stationary bicycle and is unsure if this may be causing the pain  Patient states that she would like to come in and speak with the provider  Transferred to call center to schedule appointment with vascular surgeon

## 2020-07-10 NOTE — TELEPHONE ENCOUNTER

## 2020-07-13 ENCOUNTER — OFFICE VISIT (OUTPATIENT)
Dept: VASCULAR SURGERY | Facility: CLINIC | Age: 70
End: 2020-07-13
Payer: COMMERCIAL

## 2020-07-13 VITALS
TEMPERATURE: 97.2 F | BODY MASS INDEX: 30.22 KG/M2 | WEIGHT: 177 LBS | RESPIRATION RATE: 18 BRPM | HEART RATE: 74 BPM | HEIGHT: 64 IN | DIASTOLIC BLOOD PRESSURE: 74 MMHG | SYSTOLIC BLOOD PRESSURE: 130 MMHG

## 2020-07-13 DIAGNOSIS — I65.23 CAROTID STENOSIS, ASYMPTOMATIC, BILATERAL: ICD-10-CM

## 2020-07-13 DIAGNOSIS — I74.09 AORTOILIAC OCCLUSIVE DISEASE (HCC): Primary | Chronic | ICD-10-CM

## 2020-07-13 DIAGNOSIS — I70.213 ATHEROSCLEROSIS OF NATIVE ARTERY OF BOTH LOWER EXTREMITIES WITH INTERMITTENT CLAUDICATION (HCC): ICD-10-CM

## 2020-07-13 PROCEDURE — 99214 OFFICE O/P EST MOD 30 MIN: CPT | Performed by: SURGERY

## 2020-07-13 RX ORDER — CHOLECALCIFEROL (VITAMIN D3) 125 MCG
5 CAPSULE ORAL
COMMUNITY
Start: 2020-06-05 | End: 2022-06-01

## 2020-07-13 RX ORDER — MONTELUKAST SODIUM 10 MG/1
10 TABLET ORAL
COMMUNITY
Start: 2020-07-07 | End: 2022-06-01

## 2020-07-13 NOTE — ASSESSMENT & PLAN NOTE
Aortoiliac occlusive disease with in stent restenosis more so on the left as compared to the right  She also has recurrence of her claudication symptoms in the left calf which do not cause any significant limitation  Of note her activity level is diminished secondary to her recent recovery from COVID-19  At this point we will plan on follow-up imaging and office visit in approximately 6 months and at that point further treatment recommendations will be made  She is currently maintained on appropriate antiplatelet and statin therapy

## 2020-07-13 NOTE — ASSESSMENT & PLAN NOTE
Asymptomatic moderate bilateral internal carotid artery stenosis  At this point no further workup or intervention is necessary  The patient is scheduled to undergo neurologic workup which does appear to include MRI of the brain and CT imaging of the neck and follow-up of a recent intracranial hemorrhage  I have asked that images/report from the studies before did to us for review  In general we will plan noninvasive follow-up imaging at 6 months

## 2020-07-13 NOTE — PROGRESS NOTES
Assessment/Plan: Aortoiliac occlusive disease (HCC)  Aortoiliac occlusive disease with in stent restenosis more so on the left as compared to the right  She also has recurrence of her claudication symptoms in the left calf which do not cause any significant limitation  Of note her activity level is diminished secondary to her recent recovery from COVID-19  At this point we will plan on follow-up imaging and office visit in approximately 6 months and at that point further treatment recommendations will be made  She is currently maintained on appropriate antiplatelet and statin therapy  Carotid stenosis, asymptomatic, bilateral  Asymptomatic moderate bilateral internal carotid artery stenosis  At this point no further workup or intervention is necessary  The patient is scheduled to undergo neurologic workup which does appear to include MRI of the brain and CT imaging of the neck and follow-up of a recent intracranial hemorrhage  I have asked that images/report from the studies before did to us for review  In general we will plan noninvasive follow-up imaging at 6 months  Diagnoses and all orders for this visit:    Aortoiliac occlusive disease ([address] )  -     VAS abdominal aorta/iliacs; complete study; Future  -     VAS JOSE & waveform analysis, multiple levels; Future    Atherosclerosis of native artery of both lower extremities with intermittent claudication (HCC)  -     VAS abdominal aorta/iliacs; complete study; Future  -     VAS JOSE & waveform analysis, multiple levels; Future    Carotid stenosis, asymptomatic, bilateral    Other orders  -     Melatonin 5 MG TABS; Take 5 mg by mouth  -     montelukast (SINGULAIR) 10 mg tablet; Take 10 mg by mouth          Subjective:      Patient ID: Mark Lee is a 69 y o  female  Patient presents in office to review the results of her AOIL done on 7/10  Patient reports pain in L leg   Patient reports when walking on an incline she gets cramping in the L calf and after resting for a few minutes she was able to start moving again  Patient denies any swelling in the legs  Patient denies any wounds or ulcers  Patient is taking aspirin and atorvastatin  71-year-old former smoker has a long history of aortoiliac occlusive disease status post common iliac angioplasty and stenting in 2005 and 2008  On evaluation today she does have some recurrence of left calf claudication when walking hills  She denies any specific limitations during her normal daily activities though her activities are limited during the COVID-19 pandemic especially since her recovery from COVID-19 which required hospitalization and a reported 13 day intubation  She denies any other specific limitations such as chest pain or shortness of breath but again her activities are limited  She does complain of some knee discomfort which is somewhat exacerbated by using a stationary bicycle  Aortic duplex 07/09/2020 with bilateral common iliac artery restenosis with ankle-brachial index of 0 96 on the right and 0 56 on the left  On the left this is a significant change from previous study at which time ankle-brachial index was 1 0  Carotid duplex 04/02/2020 with bilateral 50-69% carotid artery stenosis and left subclavian artery stenosis  The following portions of the patient's history were reviewed and updated as appropriate: allergies, current medications, past family history, past medical history, past social history, past surgical history and problem list     I have reviewed and made appropriate changes to the review of systems input by the medical assistant      Vitals:    07/13/20 0902   BP: 130/74   BP Location: Right arm   Patient Position: Sitting   Cuff Size: Adult   Pulse: 74   Resp: 18   Temp: (!) 97 2 °F (36 2 °C)   TempSrc: Tympanic   Weight: 80 3 kg (177 lb)   Height: 5' 4" (1 626 m)       Patient Active Problem List   Diagnosis    Primary osteoarthritis of left knee    Aortoiliac stenosis (HCC)    Arthritis of left knee    Atherosclerosis of native artery of extremity with intermittent claudication (HCC)    Diabetes mellitus ([address] )    Renal artery stenosis (HCC)    Stenosis of carotid artery    Type 2 diabetes mellitus without complication, without long-term current use of insulin (HCC)    Carotid stenosis, asymptomatic, bilateral    Stenosis of left subclavian artery (HCC)    Aortoiliac occlusive disease (HCC)       Past Surgical History:   Procedure Laterality Date    APPENDECTOMY      COLONOSCOPY      ESOPHAGEAL DILATION      ESOPHAGOGASTRODUODENOSCOPY      ILIAC ARTERY STENT Bilateral     KIDNEY STONE SURGERY      ID TOTAL KNEE ARTHROPLASTY Left 3/7/2017    Procedure: ARTHROPLASTY KNEE TOTAL;  Surgeon: John Anton MD;  Location: [facility] OR;  Service: Orthopedics       Family History   Family history unknown: Yes       Social History     Socioeconomic History    Marital status: Single     Spouse name: Not on file    Number of children: Not on file    Years of education: Not on file    Highest education level: Not on file   Occupational History    Not on file   Social Needs    Financial resource strain: Not on file    Food insecurity:     Worry: Not on file     Inability: Not on file    Transportation needs:     Medical: Not on file     Non-medical: Not on file   Tobacco Use    Smoking status: Former Smoker     Last attempt to quit: 2001     Years since quittin 6    Smokeless tobacco: Never Used   Substance and Sexual Activity    Alcohol use: No    Drug use: No    Sexual activity: Not on file   Lifestyle    Physical activity:     Days per week: Not on file     Minutes per session: Not on file    Stress: Not on file   Relationships    Social connections:     Talks on phone: Not on file     Gets together: Not on file     Attends Bahai service: Not on file     Active member of club or organization: Not on file     Attends meetings of clubs or organizations: Not on file     Relationship status: Not on file    Intimate partner violence:     Fear of current or ex partner: Not on file     Emotionally abused: Not on file     Physically abused: Not on file     Forced sexual activity: Not on file   Other Topics Concern    Not on file   Social History Narrative    Not on file       Allergies   Allergen Reactions    Codeine GI Intolerance    Compazine [Prochlorperazine] Other (See Comments)     Eyes rolled up in head         Current Outpatient Medications:     amLODIPine (NORVASC) 10 mg tablet, Take 10 mg by mouth daily, Disp: , Rfl:     aspirin (ECOTRIN LOW STRENGTH) 81 mg EC tablet, Take 1 tablet (81 mg total) by mouth daily, Disp: 90 tablet, Rfl: 0    atorvastatin (LIPITOR) 10 mg tablet, Lipitor 10 MG Oral Tablet  Refills: 0  Active- 1 tab daily, Disp: , Rfl:     cyclobenzaprine (FLEXERIL) 10 mg tablet, Cyclobenzaprine HCl - 10 MG Oral Tablet  Refills: 0  Active- 1 tab q hs prn - pt reporting taking 0 5 tablet if needed, Disp: , Rfl:     fluticasone (FLONASE) 50 mcg/act nasal spray, Fluticasone Propionate 50 MCG/ACT Nasal Suspension  Refills: 0  Active- 2 sprays each nostril daily prn, Disp: , Rfl:     lansoprazole (PREVACID) 30 mg capsule, Take 30 mg by mouth daily, Disp: , Rfl:     meclizine (ANTIVERT) 25 mg tablet, Take 25 mg by mouth daily , Disp: , Rfl:     Melatonin 5 MG TABS, Take 5 mg by mouth, Disp: , Rfl:     meloxicam (MOBIC) 7 5 mg tablet, Take 7 5 mg by mouth daily, Disp: , Rfl:     METFORMIN HCL PO, Take 500 mg by mouth 2 (two) times a day  , Disp: , Rfl:     montelukast (SINGULAIR) 10 mg tablet, Take 10 mg by mouth, Disp: , Rfl:     acetaminophen (TYLENOL) 325 mg tablet, Take 2 tablets by mouth every 6 (six) hours as needed for headaches or fever (Patient not taking: Reported on 4/30/2020), Disp: 30 tablet, Rfl: 0    celecoxib (CeleBREX) 200 mg capsule, Take 1 capsule by mouth daily for 30 days (Patient not taking: Reported on 7/13/2020), Disp: 30 capsule, Rfl: 0    docusate sodium (COLACE) 100 mg capsule, Take 1 capsule by mouth 2 (two) times a day as needed for constipation (Patient not taking: Reported on 7/13/2020), Disp: 60 capsule, Rfl: 0    Furosemide (LASIX PO), Take 20 mg by mouth daily  , Disp: , Rfl:     oxyCODONE (ROXICODONE) 5 mg immediate release tablet, Take 1-2 tablets by mouth every 4 (four) hours as needed for moderate pain Max Daily Amount: 60 mg (Patient not taking: Reported on 4/30/2020), Disp: 30 tablet, Rfl: 0    RA COUGH DM 30 MG/5ML SUER, take 10 milliliters by mouth twice a day, Disp: , Rfl:     traMADol (ULTRAM) 50 mg tablet, Take 1 tablet by mouth every 6 (six) hours as needed for moderate pain (Patient not taking: Reported on 4/30/2020), Disp: 30 tablet, Rfl: 0    Review of Systems   Constitutional: Negative  Negative for chills and fever  HENT: Negative  Eyes: Negative  Respiratory: Negative  Cardiovascular: Negative  Negative for leg swelling  Gastrointestinal: Negative  Endocrine: Negative  Genitourinary: Negative  Musculoskeletal: Negative  Skin: Negative  Negative for wound  Allergic/Immunologic: Negative  Neurological: Negative  Hematological: Negative  Psychiatric/Behavioral: Negative  Objective:      /74 (BP Location: Right arm, Patient Position: Sitting, Cuff Size: Adult)   Pulse 74   Temp (!) 97 2 °F (36 2 °C) (Tympanic)   Resp 18   Ht 5' 4" (1 626 m)   Wt 80 3 kg (177 lb)   BMI 30 38 kg/m²          Physical Exam   Constitutional: She is oriented to person, place, and time  She appears well-developed and well-nourished  HENT:   Head: Normocephalic and atraumatic  Eyes: Conjunctivae and EOM are normal    Neck: Normal range of motion  Neck supple  No JVD present  Carotid bruit is present (Bilateral)  Cardiovascular: Normal rate, regular rhythm, S1 normal, S2 normal and normal heart sounds     No murmur heard   Pulses:       Carotid pulses are 2+ on the right side with bruit, and 2+ on the left side with bruit  Radial pulses are 0 on the right side, and 0 on the left side  Femoral pulses are 2+ on the right side, and 1+ on the left side  Popliteal pulses are 1+ on the right side, and 0 on the left side  Dorsalis pedis pulses are 0 on the left side  Posterior tibial pulses are 0 on the left side  Pulmonary/Chest: Effort normal and breath sounds normal    Abdominal: Soft  Normal appearance and normal aorta  She exhibits no abdominal bruit and no pulsatile midline mass  There is no tenderness  No hernia  Musculoskeletal: Normal range of motion  She exhibits no edema, tenderness or deformity  Neurological: She is alert and oriented to person, place, and time  She has normal strength  No sensory deficit  Skin: Skin is warm, dry and intact  Capillary refill takes more than 3 seconds  No pallor  Psychiatric: She has a normal mood and affect   Her speech is normal and behavior is normal

## 2020-07-13 NOTE — LETTER
July 13, 2020     [place] [id number]    Patient: Mili Valle   YOB: 1950   Date of Visit: 7/13/2020       Dear Dr Makeda Phelps: Thank you for referring Alexa Anthony to me for evaluation  Below are the relevant portions of my assessment and plan of care  Diagnoses and all orders for this visit:    Aortoiliac occlusive disease ([address] )  Aortoiliac occlusive disease with in stent restenosis more so on the left as compared to the right  She also has recurrence of her claudication symptoms in the left calf which do not cause any significant limitation  Of note her activity level is diminished secondary to her recent recovery from COVID-19  At this point we will plan on follow-up imaging and office visit in approximately 6 months and at that point further treatment recommendations will be made  She is currently maintained on appropriate antiplatelet and statin therapy  Carotid stenosis, asymptomatic, bilateral  Asymptomatic moderate bilateral internal carotid artery stenosis  At this point no further workup or intervention is necessary  The patient is scheduled to undergo neurologic workup which does appear to include MRI of the brain and CT imaging of the neck and follow-up of a recent intracranial hemorrhage  I have asked that images/report from the studies before did to us for review  In general we will plan noninvasive follow-up imaging at 6 months  If you have questions, please do not hesitate to call me  I look forward to following Pedro Luis Linn along with you           Sincerely,        Whitney Schultz MD        CC: No Recipients

## 2020-07-13 NOTE — PATIENT INSTRUCTIONS
Aortoiliac occlusive disease (HCC)  Aortoiliac occlusive disease with in stent restenosis more so on the left as compared to the right  She also has recurrence of her claudication symptoms in the left calf which do not cause any significant limitation  Of note her activity level is diminished secondary to her recent recovery from COVID-19  At this point we will plan on follow-up imaging and office visit in approximately 6 months and at that point further treatment recommendations will be made  She is currently maintained on appropriate antiplatelet and statin therapy  Carotid stenosis, asymptomatic, bilateral  Asymptomatic moderate bilateral internal carotid artery stenosis  At this point no further workup or intervention is necessary  The patient is scheduled to undergo neurologic workup which does appear to include MRI of the brain and CT imaging of the neck and follow-up of a recent intracranial hemorrhage  I have asked that images/report from the studies before did to us for review  In general we will plan noninvasive follow-up imaging at 6 months

## 2020-10-30 ENCOUNTER — HOSPITAL ENCOUNTER (OUTPATIENT)
Dept: NON INVASIVE DIAGNOSTICS | Facility: CLINIC | Age: 70
Discharge: HOME | End: 2020-10-30
Payer: COMMERCIAL

## 2020-10-30 DIAGNOSIS — I65.23 CAROTID STENOSIS, ASYMPTOMATIC, BILATERAL: ICD-10-CM

## 2020-10-30 DIAGNOSIS — I77.1 STENOSIS OF LEFT SUBCLAVIAN ARTERY (HCC): ICD-10-CM

## 2020-10-30 PROCEDURE — 93880 EXTRACRANIAL BILAT STUDY: CPT

## 2020-10-30 PROCEDURE — 93880 EXTRACRANIAL BILAT STUDY: CPT | Performed by: SURGERY

## 2020-11-01 DIAGNOSIS — I65.23 CAROTID STENOSIS, ASYMPTOMATIC, BILATERAL: Primary | ICD-10-CM

## 2021-03-03 ENCOUNTER — HOSPITAL ENCOUNTER (OUTPATIENT)
Dept: NON INVASIVE DIAGNOSTICS | Facility: CLINIC | Age: 71
Discharge: HOME | End: 2021-03-03
Payer: COMMERCIAL

## 2021-03-03 DIAGNOSIS — I70.213 ATHEROSCLEROSIS OF NATIVE ARTERY OF BOTH LOWER EXTREMITIES WITH INTERMITTENT CLAUDICATION (HCC): ICD-10-CM

## 2021-03-03 DIAGNOSIS — I74.09 AORTOILIAC OCCLUSIVE DISEASE (HCC): Chronic | ICD-10-CM

## 2021-03-03 PROCEDURE — 93978 VASCULAR STUDY: CPT | Performed by: SURGERY

## 2021-03-03 PROCEDURE — 93978 VASCULAR STUDY: CPT

## 2021-03-03 PROCEDURE — 93923 UPR/LXTR ART STDY 3+ LVLS: CPT

## 2021-03-04 ENCOUNTER — TELEPHONE (OUTPATIENT)
Dept: VASCULAR SURGERY | Facility: CLINIC | Age: 71
End: 2021-03-04

## 2021-03-04 NOTE — TELEPHONE ENCOUNTER
----- Message from Jimi Martinez sent at 3/4/2021  3:29 PM EST -----    ----- Message -----  From: Sarai Wang MD  Sent: 3/4/2021   3:15 PM EST  To: [facility] Consensus [facility]    Please schedule OV  ----- Message -----  From: Jimi Martinez  Sent: 3/4/2021   9:45 AM EST  To: Sarai Wang MD    Task study to Sarai Wang

## 2021-03-29 ENCOUNTER — OFFICE VISIT (OUTPATIENT)
Dept: VASCULAR SURGERY | Facility: CLINIC | Age: 71
End: 2021-03-29
Payer: COMMERCIAL

## 2021-03-29 VITALS
WEIGHT: 196 LBS | DIASTOLIC BLOOD PRESSURE: 80 MMHG | HEIGHT: 64 IN | SYSTOLIC BLOOD PRESSURE: 120 MMHG | BODY MASS INDEX: 33.46 KG/M2 | HEART RATE: 72 BPM

## 2021-03-29 DIAGNOSIS — I65.23 CAROTID STENOSIS, ASYMPTOMATIC, BILATERAL: Primary | ICD-10-CM

## 2021-03-29 DIAGNOSIS — I74.09 AORTOILIAC OCCLUSIVE DISEASE (HCC): Chronic | ICD-10-CM

## 2021-03-29 DIAGNOSIS — I70.213 ATHEROSCLEROSIS OF NATIVE ARTERY OF BOTH LOWER EXTREMITIES WITH INTERMITTENT CLAUDICATION (HCC): ICD-10-CM

## 2021-03-29 PROCEDURE — 99214 OFFICE O/P EST MOD 30 MIN: CPT | Performed by: SURGERY

## 2021-03-29 NOTE — ASSESSMENT & PLAN NOTE
Recurrent aortoiliac occlusive disease with restenosis the right common iliac artery and high-grade stenosis versus occlusion of the left  She remains asymptomatic on the left but does complain of some right calf claudication symptoms on the right  Which may in fact be more of a musculoskeletal etiology  At this point with her minimal symptoms which have been chronic in nature and stable I would not advise any further intervention other than continued periodic follow-up with duplex imaging  Will continue her current medical management for which she is already on antiplatelet and statin therapy

## 2021-03-29 NOTE — LETTER
March 29, 2021     [place] [address] [address] [id number]    Patient: Janay Linda   YOB: 1950   Date of Visit: 3/29/2021       Dear Dr Bowers Abt: Thank you for referring Denise Toscano to me for evaluation  Below are the relevant portions of my assessment and plan of care  Aortoiliac occlusive disease (HCC)    Recurrent aortoiliac occlusive disease with restenosis the right common iliac artery and high-grade stenosis versus occlusion of the left  She remains asymptomatic on the left but does complain of some calf claudication symptoms on the right which may in fact be more of a musculoskeletal etiology  At this point with her minimal symptoms which have been chronic in nature and stable I would not advise any further intervention other than continued periodic follow-up with duplex imaging  Will continue her current medical management for which she is already on antiplatelet and statin therapy  Carotid stenosis, asymptomatic, bilateral    Asymptomatic moderate bilateral internal carotid artery stenosis  We will continue to follow with periodic duplex imaging and she will continue her current antiplatelet and statin therapy  If you have questions, please do not hesitate to call me  I look forward to following Pilar Gilmore along with you           Sincerely,        Michael Holguin MD        CC: No Recipients

## 2021-03-29 NOTE — PATIENT INSTRUCTIONS
Aortoiliac occlusive disease (HCC)    Recurrent aortoiliac occlusive disease with restenosis the right common iliac artery and high-grade stenosis versus occlusion of the left  She remains asymptomatic on the left but does complain of some right calf claudication symptoms on the right  Which may in fact be more of a musculoskeletal etiology  At this point with her minimal symptoms which have been chronic in nature and stable I would not advise any further intervention other than continued periodic follow-up with duplex imaging  Will continue her current medical management for which she is already on antiplatelet and statin therapy  Carotid stenosis, asymptomatic, bilateral    Asymptomatic moderate bilateral internal carotid artery stenosis  We will continue to follow with periodic duplex imaging and she will continue her current antiplatelet and statin therapy

## 2021-03-29 NOTE — PROGRESS NOTES
Assessment/Plan: Aortoiliac occlusive disease (HCC)    Recurrent aortoiliac occlusive disease with restenosis the right common iliac artery and high-grade stenosis versus occlusion of the left  She remains asymptomatic on the left but does complain of some right calf claudication symptoms on the right  Which may in fact be more of a musculoskeletal etiology  At this point with her minimal symptoms which have been chronic in nature and stable I would not advise any further intervention other than continued periodic follow-up with duplex imaging  Will continue her current medical management for which she is already on antiplatelet and statin therapy  Carotid stenosis, asymptomatic, bilateral    Asymptomatic moderate bilateral internal carotid artery stenosis  We will continue to follow with periodic duplex imaging and she will continue her current antiplatelet and statin therapy  Diagnoses and all orders for this visit:    Carotid stenosis, asymptomatic, bilateral    Aortoiliac occlusive disease ([address] )    Atherosclerosis of native artery of both lower extremities with intermittent claudication ([address] )    Other orders  -     metFORMIN (GLUCOPHAGE) 850 mg tablet; Take 850 mg by mouth 2 (two) times a day with meals          Subjective:      Patient ID: Nishi Willard is a 69 y o  female  Patient presents today for review of AOIL done 3/3/21  Pt states she has back pain  She denies abdominal pain and pain after eating  She denies TIA and stroke symptoms  She states she has dizziness that is different from the vertigo  Patient takes ASA 81mg and Atorvastatin  76-year-old with long history of peripheral arterial occlusive disease has had previous iliac stenting procedures  She now follows up for carotid and iliac occlusive disease  In this regard she continues to complain of right calf claudication symptoms which have been chronic in nature and non limiting    She describes discomfort in the right calf which occurs with ambulation and resolves with rest   She denies any symptoms on the left though she has had recent left knee and hip replacements which have resolved her associated hip and knee pain  She specifically notes no major limitations in her ability to walk though she does note a generalized fatigue after walking distances  She also denies any focal neurologic symptoms which would be consistent with TIA, CVA or amaurosis fugax  Of note she was hospitalized with COVID and in fact intubated for approximately 2 weeks  During that acute phase she did have intracranial hemorrhage but denies any focal residual symptoms in this regard  She does note since that time she has had generalized fatigue with some underlying shortness of breath  Aortic duplex 03/03/2021 with right common iliac artery stenosis of greater than 75% with flow velocity of 455 with ankle-brachial index of 1 23 which is unchanged from previous study 07/09/2020  On the left there is suggestion of a high-grade stenosis versus occlusion the common iliac artery with ankle-brachial index is 0 68 which is slightly higher than previous study 0 5  Carotid duplex with bilateral 50-69% stenosis with flow velocity on the right 139/24 centimeters/second and on the left 172/23 centimeters/second  The following portions of the patient's history were reviewed and updated as appropriate: allergies, current medications, past family history, past medical history, past social history, past surgical history and problem list     I have reviewed and made appropriate changes to the review of systems input by the medical assistant      Vitals:    03/29/21 1247 03/29/21 1249   BP: 140/80 120/80   BP Location: Right arm Left arm   Patient Position: Sitting Sitting   Pulse: 72    Weight: 88 9 kg (196 lb)    Height: 5' 4" (1 626 m)        Patient Active Problem List   Diagnosis    Primary osteoarthritis of left knee    Aortoiliac stenosis ([facility] )    Arthritis of left knee    Atherosclerosis of native artery of extremity with intermittent claudication ([facility] )    Diabetes mellitus ([facility] )    Renal artery stenosis (HCC)    Stenosis of carotid artery    Type 2 diabetes mellitus without complication, without long-term current use of insulin (HCC)    Carotid stenosis, asymptomatic, bilateral    Stenosis of left subclavian artery (HCC)    Aortoiliac occlusive disease (HCC)       Past Surgical History:   Procedure Laterality Date    APPENDECTOMY      COLONOSCOPY      ESOPHAGEAL DILATION      ESOPHAGOGASTRODUODENOSCOPY      ILIAC ARTERY STENT Bilateral     KIDNEY STONE SURGERY      WA TOTAL KNEE ARTHROPLASTY Left 3/7/2017    Procedure: ARTHROPLASTY KNEE TOTAL;  Surgeon: Jessica Schuster MD;  Location: [facility] OR;  Service: Orthopedics       Family History   Family history unknown: Yes       Social History     Socioeconomic History    Marital status: Single     Spouse name: Not on file    Number of children: Not on file    Years of education: Not on file    Highest education level: Not on file   Occupational History    Not on file   Social Needs    Financial resource strain: Not on file    Food insecurity     Worry: Not on file     Inability: Not on file    Transportation needs     Medical: Not on file     Non-medical: Not on file   Tobacco Use    Smoking status: Former Smoker     Quit date: 2001     Years since quittin 3    Smokeless tobacco: Never Used   Substance and Sexual Activity    Alcohol use: No    Drug use: No    Sexual activity: Not on file   Lifestyle    Physical activity     Days per week: Not on file     Minutes per session: Not on file    Stress: Not on file   Relationships    Social connections     Talks on phone: Not on file     Gets together: Not on file     Attends Zoroastrian service: Not on file     Active member of club or organization: Not on file     Attends meetings of clubs or organizations: Not on file Relationship status: Not on file    Intimate partner violence     Fear of current or ex partner: Not on file     Emotionally abused: Not on file     Physically abused: Not on file     Forced sexual activity: Not on file   Other Topics Concern    Not on file   Social History Narrative    Not on file       Allergies   Allergen Reactions    Codeine GI Intolerance    Compazine [Prochlorperazine] Other (See Comments)     Eyes rolled up in head    Tramadol GI Intolerance         Current Outpatient Medications:     amLODIPine (NORVASC) 10 mg tablet, Take 10 mg by mouth daily, Disp: , Rfl:     aspirin (ECOTRIN LOW STRENGTH) 81 mg EC tablet, Take 1 tablet (81 mg total) by mouth daily, Disp: 90 tablet, Rfl: 0    atorvastatin (LIPITOR) 10 mg tablet, Lipitor 10 MG Oral Tablet  Refills: 0  Active- 1 tab daily, Disp: , Rfl:     fluticasone (FLONASE) 50 mcg/act nasal spray, Fluticasone Propionate 50 MCG/ACT Nasal Suspension  Refills: 0  Active- 2 sprays each nostril daily prn, Disp: , Rfl:     Furosemide (LASIX PO), Take 20 mg by mouth daily  , Disp: , Rfl:     lansoprazole (PREVACID) 30 mg capsule, Take 30 mg by mouth daily, Disp: , Rfl:     Melatonin 5 MG TABS, Take 5 mg by mouth, Disp: , Rfl:     meloxicam (MOBIC) 7 5 mg tablet, Take 7 5 mg by mouth daily, Disp: , Rfl:     metFORMIN (GLUCOPHAGE) 850 mg tablet, Take 850 mg by mouth 2 (two) times a day with meals, Disp: , Rfl:     montelukast (SINGULAIR) 10 mg tablet, Take 10 mg by mouth, Disp: , Rfl:     acetaminophen (TYLENOL) 325 mg tablet, Take 2 tablets by mouth every 6 (six) hours as needed for headaches or fever (Patient not taking: Reported on 4/30/2020), Disp: 30 tablet, Rfl: 0    celecoxib (CeleBREX) 200 mg capsule, Take 1 capsule by mouth daily for 30 days (Patient not taking: Reported on 7/13/2020), Disp: 30 capsule, Rfl: 0    cyclobenzaprine (FLEXERIL) 10 mg tablet, Cyclobenzaprine HCl - 10 MG Oral Tablet  Refills: 0  Active- 1 tab q hs prn - pt reporting taking 0 5 tablet if needed, Disp: , Rfl:     docusate sodium (COLACE) 100 mg capsule, Take 1 capsule by mouth 2 (two) times a day as needed for constipation (Patient not taking: Reported on 7/13/2020), Disp: 60 capsule, Rfl: 0    meclizine (ANTIVERT) 25 mg tablet, Take 25 mg by mouth daily , Disp: , Rfl:     oxyCODONE (ROXICODONE) 5 mg immediate release tablet, Take 1-2 tablets by mouth every 4 (four) hours as needed for moderate pain Max Daily Amount: 60 mg (Patient not taking: Reported on 4/30/2020), Disp: 30 tablet, Rfl: 0    RA COUGH DM 30 MG/5ML SUER, take 10 milliliters by mouth twice a day, Disp: , Rfl:     traMADol (ULTRAM) 50 mg tablet, Take 1 tablet by mouth every 6 (six) hours as needed for moderate pain (Patient not taking: Reported on 4/30/2020), Disp: 30 tablet, Rfl: 0    Review of Systems   Constitutional: Negative  HENT: Negative  Eyes: Negative  Respiratory: Positive for shortness of breath  Cardiovascular: Negative  Gastrointestinal: Negative  Endocrine: Negative  Genitourinary: Negative  Musculoskeletal: Positive for back pain  Skin: Negative  Allergic/Immunologic: Negative  Neurological: Positive for dizziness  Hematological: Bruises/bleeds easily  Psychiatric/Behavioral: Negative  Objective:      /80 (BP Location: Left arm, Patient Position: Sitting)   Pulse 72   Ht 5' 4" (1 626 m)   Wt 88 9 kg (196 lb)   BMI 33 64 kg/m²          Physical Exam  Constitutional:       Appearance: Normal appearance  She is well-developed  HENT:      Head: Normocephalic and atraumatic  Eyes:      Conjunctiva/sclera: Conjunctivae normal    Neck:      Musculoskeletal: Normal range of motion and neck supple  Vascular: Carotid bruit (  Bilateral) present  No JVD  Cardiovascular:      Rate and Rhythm: Normal rate and regular rhythm  Pulses:           Carotid pulses are 2+ on the right side and 2+ on the left side         Radial pulses are 2+ on the right side and 2+ on the left side  Femoral pulses are 2+ on the right side and 0 on the left side  Popliteal pulses are 2+ on the right side and 0 on the left side  Dorsalis pedis pulses are 2+ on the right side and 0 on the left side  Posterior tibial pulses are 2+ on the right side and 0 on the left side  Heart sounds: Normal heart sounds, S1 normal and S2 normal  No murmur  Comments: Both feet are pink and warm   Pulmonary:      Effort: Pulmonary effort is normal       Breath sounds: Normal breath sounds  Abdominal:      General: There is no abdominal bruit  Palpations: Abdomen is soft  There is no pulsatile mass  Tenderness: There is no abdominal tenderness  Hernia: No hernia is present  Musculoskeletal: Normal range of motion  General: No tenderness or deformity  Skin:     General: Skin is warm and dry  Coloration: Skin is not pale  Neurological:      Mental Status: She is alert and oriented to person, place, and time  Sensory: No sensory deficit     Psychiatric:         Speech: Speech normal          Behavior: Behavior normal

## 2021-06-01 ENCOUNTER — HOSPITAL ENCOUNTER (OUTPATIENT)
Dept: NON INVASIVE DIAGNOSTICS | Facility: CLINIC | Age: 71
Discharge: HOME | End: 2021-06-01
Payer: COMMERCIAL

## 2021-06-01 DIAGNOSIS — I65.23 CAROTID STENOSIS, ASYMPTOMATIC, BILATERAL: ICD-10-CM

## 2021-06-01 DIAGNOSIS — I65.23 CAROTID STENOSIS, ASYMPTOMATIC, BILATERAL: Primary | ICD-10-CM

## 2021-06-01 PROCEDURE — 93880 EXTRACRANIAL BILAT STUDY: CPT

## 2021-06-01 PROCEDURE — 93880 EXTRACRANIAL BILAT STUDY: CPT | Performed by: SURGERY

## 2021-11-01 ENCOUNTER — TELEPHONE (OUTPATIENT)
Dept: VASCULAR SURGERY | Facility: CLINIC | Age: 71
End: 2021-11-01

## 2021-11-03 ENCOUNTER — HOSPITAL ENCOUNTER (OUTPATIENT)
Dept: NON INVASIVE DIAGNOSTICS | Facility: HOSPITAL | Age: 71
Discharge: HOME | End: 2021-11-03
Attending: SURGERY
Payer: COMMERCIAL

## 2021-11-03 DIAGNOSIS — I70.213 ATHEROSCLEROSIS OF NATIVE ARTERY OF BOTH LOWER EXTREMITIES WITH INTERMITTENT CLAUDICATION (HCC): ICD-10-CM

## 2021-11-03 DIAGNOSIS — I74.09 AORTOILIAC OCCLUSIVE DISEASE (HCC): Chronic | ICD-10-CM

## 2021-11-03 PROCEDURE — 93923 UPR/LXTR ART STDY 3+ LVLS: CPT

## 2021-11-03 PROCEDURE — 93978 VASCULAR STUDY: CPT

## 2021-11-03 PROCEDURE — 93978 VASCULAR STUDY: CPT | Performed by: SURGERY

## 2022-04-16 ENCOUNTER — TELEPHONE (OUTPATIENT)
Dept: OTHER | Facility: OTHER | Age: 72
End: 2022-04-16

## 2022-04-16 NOTE — TELEPHONE ENCOUNTER
[facility]  She stated they want her to have a heart test, but she would like to ask her cardio Dr if she should have the test

## 2022-04-18 NOTE — TELEPHONE ENCOUNTER
Called patient to tell her that we are not her cardiologist , dr Rina Fernández is vascular  She is in the hospital with the flu , so I advised her to to have the tests done that they are recommending while she is there  She doesn't think she ever saw a cardiologist , just her PCP

## 2022-05-07 ENCOUNTER — APPOINTMENT (EMERGENCY)
Dept: CT IMAGING | Facility: HOSPITAL | Age: 72
DRG: 291 | End: 2022-05-07
Payer: COMMERCIAL

## 2022-05-07 ENCOUNTER — HOSPITAL ENCOUNTER (INPATIENT)
Facility: HOSPITAL | Age: 72
LOS: 2 days | Discharge: HOME | DRG: 291 | End: 2022-05-10
Attending: EMERGENCY MEDICINE | Admitting: INTERNAL MEDICINE
Payer: COMMERCIAL

## 2022-05-07 ENCOUNTER — APPOINTMENT (EMERGENCY)
Dept: RADIOLOGY | Facility: HOSPITAL | Age: 72
DRG: 291 | End: 2022-05-07
Payer: COMMERCIAL

## 2022-05-07 DIAGNOSIS — R77.8 ELEVATED TROPONIN: ICD-10-CM

## 2022-05-07 DIAGNOSIS — I21.4 NSTEMI (NON-ST ELEVATED MYOCARDIAL INFARCTION) (HCC): ICD-10-CM

## 2022-05-07 DIAGNOSIS — I95.9 HYPOTENSION: Primary | ICD-10-CM

## 2022-05-07 DIAGNOSIS — R06.00 DYSPNEA ON EXERTION: ICD-10-CM

## 2022-05-07 DIAGNOSIS — R79.89 ELEVATED BRAIN NATRIURETIC PEPTIDE (BNP) LEVEL: ICD-10-CM

## 2022-05-07 PROBLEM — I50.43 ACUTE ON CHRONIC COMBINED SYSTOLIC AND DIASTOLIC HEART FAILURE (HCC): Status: ACTIVE | Noted: 2022-05-07

## 2022-05-07 PROBLEM — I10 HYPERTENSION: Status: ACTIVE | Noted: 2022-05-07

## 2022-05-07 LAB
2HR DELTA HS TROPONIN: -7 NG/L
4HR DELTA HS TROPONIN: -28 NG/L
ALBUMIN SERPL BCP-MCNC: 3.8 G/DL (ref 3–5.2)
ALP SERPL-CCNC: 88 U/L (ref 43–122)
ALT SERPL W P-5'-P-CCNC: 19 U/L
ANION GAP SERPL CALCULATED.3IONS-SCNC: 8 MMOL/L (ref 5–14)
AST SERPL W P-5'-P-CCNC: 29 U/L (ref 14–36)
ATRIAL RATE: 85 BPM
BACTERIA UR QL AUTO: NORMAL /HPF
BASOPHILS # BLD AUTO: 0.03 THOUSANDS/ΜL (ref 0–0.1)
BASOPHILS NFR BLD AUTO: 0 % (ref 0–1)
BILIRUB SERPL-MCNC: 1.46 MG/DL
BILIRUB UR QL STRIP: NEGATIVE
BUN SERPL-MCNC: 3 MG/DL (ref 5–25)
CALCIUM SERPL-MCNC: 8.5 MG/DL (ref 8.4–10.2)
CARDIAC TROPONIN I PNL SERPL HS: 111 NG/L
CARDIAC TROPONIN I PNL SERPL HS: 132 NG/L
CARDIAC TROPONIN I PNL SERPL HS: 139 NG/L
CHLORIDE SERPL-SCNC: 107 MMOL/L (ref 97–108)
CLARITY UR: CLEAR
CO2 SERPL-SCNC: 26 MMOL/L (ref 22–30)
COLOR UR: ABNORMAL
CREAT SERPL-MCNC: 0.77 MG/DL (ref 0.6–1.2)
D DIMER PPP FEU-MCNC: 2.19 UG/ML FEU
EOSINOPHIL # BLD AUTO: 0.1 THOUSAND/ΜL (ref 0–0.61)
EOSINOPHIL NFR BLD AUTO: 1 % (ref 0–6)
ERYTHROCYTE [DISTWIDTH] IN BLOOD BY AUTOMATED COUNT: 15.4 % (ref 11.6–15.1)
GFR SERPL CREATININE-BSD FRML MDRD: 77 ML/MIN/1.73SQ M
GLUCOSE SERPL-MCNC: 114 MG/DL (ref 65–140)
GLUCOSE SERPL-MCNC: 140 MG/DL (ref 70–99)
GLUCOSE SERPL-MCNC: 143 MG/DL (ref 65–140)
GLUCOSE UR STRIP-MCNC: NEGATIVE MG/DL
HCT VFR BLD AUTO: 37.4 % (ref 34.8–46.1)
HGB BLD-MCNC: 11.8 G/DL (ref 11.5–15.4)
HGB UR QL STRIP.AUTO: 25
IMM GRANULOCYTES # BLD AUTO: 0.03 THOUSAND/UL (ref 0–0.2)
IMM GRANULOCYTES NFR BLD AUTO: 0 % (ref 0–2)
KETONES UR STRIP-MCNC: NEGATIVE MG/DL
LEUKOCYTE ESTERASE UR QL STRIP: 25
LYMPHOCYTES # BLD AUTO: 1.65 THOUSANDS/ΜL (ref 0.6–4.47)
LYMPHOCYTES NFR BLD AUTO: 22 % (ref 14–44)
MAGNESIUM SERPL-MCNC: 1.7 MG/DL (ref 1.6–2.3)
MCH RBC QN AUTO: 29.5 PG (ref 26.8–34.3)
MCHC RBC AUTO-ENTMCNC: 31.6 G/DL (ref 31.4–37.4)
MCV RBC AUTO: 94 FL (ref 82–98)
MONOCYTES # BLD AUTO: 0.6 THOUSAND/ΜL (ref 0.17–1.22)
MONOCYTES NFR BLD AUTO: 8 % (ref 4–12)
NEUTROPHILS # BLD AUTO: 4.97 THOUSANDS/ΜL (ref 1.85–7.62)
NEUTS SEG NFR BLD AUTO: 69 % (ref 43–75)
NITRITE UR QL STRIP: NEGATIVE
NON-SQ EPI CELLS URNS QL MICRO: NORMAL /HPF
NRBC BLD AUTO-RTO: 0 /100 WBCS
NT-PROBNP SERPL-MCNC: 5390 PG/ML (ref 0–299)
P AXIS: 43 DEGREES
PH UR STRIP.AUTO: 6 [PH]
PHOSPHATE SERPL-MCNC: 3.2 MG/DL (ref 2.5–4.8)
PLATELET # BLD AUTO: 154 THOUSANDS/UL (ref 149–390)
PMV BLD AUTO: 11.3 FL (ref 8.9–12.7)
POTASSIUM SERPL-SCNC: 3.4 MMOL/L (ref 3.6–5)
PR INTERVAL: 146 MS
PROT SERPL-MCNC: 7.5 G/DL (ref 5.9–8.4)
PROT UR STRIP-MCNC: ABNORMAL MG/DL
QRS AXIS: 11 DEGREES
QRSD INTERVAL: 78 MS
QT INTERVAL: 430 MS
QTC INTERVAL: 511 MS
RBC # BLD AUTO: 4 MILLION/UL (ref 3.81–5.12)
RBC #/AREA URNS AUTO: NORMAL /HPF
SODIUM SERPL-SCNC: 141 MMOL/L (ref 137–147)
SP GR UR STRIP.AUTO: 1.01 (ref 1–1.04)
T WAVE AXIS: 181 DEGREES
UROBILINOGEN UA: NEGATIVE MG/DL
VENTRICULAR RATE: 85 BPM
WBC # BLD AUTO: 7.38 THOUSAND/UL (ref 4.31–10.16)
WBC #/AREA URNS AUTO: NORMAL /HPF

## 2022-05-07 PROCEDURE — 80053 COMPREHEN METABOLIC PANEL: CPT | Performed by: PHYSICIAN ASSISTANT

## 2022-05-07 PROCEDURE — 71046 X-RAY EXAM CHEST 2 VIEWS: CPT

## 2022-05-07 PROCEDURE — G1004 CDSM NDSC: HCPCS

## 2022-05-07 PROCEDURE — 93010 ELECTROCARDIOGRAM REPORT: CPT | Performed by: INTERNAL MEDICINE

## 2022-05-07 PROCEDURE — 82948 REAGENT STRIP/BLOOD GLUCOSE: CPT

## 2022-05-07 PROCEDURE — 72125 CT NECK SPINE W/O DYE: CPT

## 2022-05-07 PROCEDURE — 70450 CT HEAD/BRAIN W/O DYE: CPT

## 2022-05-07 PROCEDURE — 99285 EMERGENCY DEPT VISIT HI MDM: CPT | Performed by: PHYSICIAN ASSISTANT

## 2022-05-07 PROCEDURE — 84484 ASSAY OF TROPONIN QUANT: CPT | Performed by: PHYSICIAN ASSISTANT

## 2022-05-07 PROCEDURE — 81001 URINALYSIS AUTO W/SCOPE: CPT | Performed by: PHYSICIAN ASSISTANT

## 2022-05-07 PROCEDURE — 99285 EMERGENCY DEPT VISIT HI MDM: CPT

## 2022-05-07 PROCEDURE — 85379 FIBRIN DEGRADATION QUANT: CPT | Performed by: PHYSICIAN ASSISTANT

## 2022-05-07 PROCEDURE — 93005 ELECTROCARDIOGRAM TRACING: CPT

## 2022-05-07 PROCEDURE — 99220 PR INITIAL OBSERVATION CARE/DAY 70 MINUTES: CPT | Performed by: INTERNAL MEDICINE

## 2022-05-07 PROCEDURE — 84100 ASSAY OF PHOSPHORUS: CPT | Performed by: PHYSICIAN ASSISTANT

## 2022-05-07 PROCEDURE — 83880 ASSAY OF NATRIURETIC PEPTIDE: CPT | Performed by: PHYSICIAN ASSISTANT

## 2022-05-07 PROCEDURE — 85025 COMPLETE CBC W/AUTO DIFF WBC: CPT | Performed by: PHYSICIAN ASSISTANT

## 2022-05-07 PROCEDURE — 83735 ASSAY OF MAGNESIUM: CPT | Performed by: PHYSICIAN ASSISTANT

## 2022-05-07 PROCEDURE — 36415 COLL VENOUS BLD VENIPUNCTURE: CPT | Performed by: PHYSICIAN ASSISTANT

## 2022-05-07 RX ORDER — INSULIN LISPRO 100 [IU]/ML
1-6 INJECTION, SOLUTION INTRAVENOUS; SUBCUTANEOUS
Status: DISCONTINUED | OUTPATIENT
Start: 2022-05-07 | End: 2022-05-10 | Stop reason: HOSPADM

## 2022-05-07 RX ORDER — FUROSEMIDE 10 MG/ML
40 INJECTION INTRAMUSCULAR; INTRAVENOUS ONCE
Status: DISCONTINUED | OUTPATIENT
Start: 2022-05-07 | End: 2022-05-08

## 2022-05-07 RX ORDER — PANTOPRAZOLE SODIUM 40 MG/1
40 TABLET, DELAYED RELEASE ORAL
Status: DISCONTINUED | OUTPATIENT
Start: 2022-05-08 | End: 2022-05-10 | Stop reason: HOSPADM

## 2022-05-07 RX ORDER — FUROSEMIDE 10 MG/ML
40 INJECTION INTRAMUSCULAR; INTRAVENOUS DAILY
Status: DISCONTINUED | OUTPATIENT
Start: 2022-05-08 | End: 2022-05-08

## 2022-05-07 RX ORDER — TRAMADOL HYDROCHLORIDE 50 MG/1
50 TABLET ORAL EVERY 6 HOURS PRN
Status: DISCONTINUED | OUTPATIENT
Start: 2022-05-07 | End: 2022-05-10 | Stop reason: HOSPADM

## 2022-05-07 RX ORDER — ENOXAPARIN SODIUM 100 MG/ML
40 INJECTION SUBCUTANEOUS DAILY
Status: DISCONTINUED | OUTPATIENT
Start: 2022-05-08 | End: 2022-05-10 | Stop reason: HOSPADM

## 2022-05-07 RX ORDER — ACETAMINOPHEN 325 MG/1
650 TABLET ORAL EVERY 4 HOURS PRN
Status: DISCONTINUED | OUTPATIENT
Start: 2022-05-07 | End: 2022-05-07

## 2022-05-07 RX ORDER — ACETAMINOPHEN 325 MG/1
975 TABLET ORAL EVERY 6 HOURS PRN
Status: DISCONTINUED | OUTPATIENT
Start: 2022-05-07 | End: 2022-05-10 | Stop reason: HOSPADM

## 2022-05-07 RX ORDER — CYCLOBENZAPRINE HCL 5 MG
5 TABLET ORAL
Status: DISCONTINUED | OUTPATIENT
Start: 2022-05-07 | End: 2022-05-10 | Stop reason: HOSPADM

## 2022-05-07 RX ORDER — OXYCODONE HYDROCHLORIDE 5 MG/1
5 TABLET ORAL EVERY 4 HOURS PRN
Status: DISCONTINUED | OUTPATIENT
Start: 2022-05-07 | End: 2022-05-10 | Stop reason: HOSPADM

## 2022-05-07 RX ORDER — CHOLECALCIFEROL (VITAMIN D3) 125 MCG
5 CAPSULE ORAL
Status: DISCONTINUED | OUTPATIENT
Start: 2022-05-07 | End: 2022-05-07 | Stop reason: SDUPTHER

## 2022-05-07 RX ORDER — ATORVASTATIN CALCIUM 40 MG/1
40 TABLET, FILM COATED ORAL
Status: DISCONTINUED | OUTPATIENT
Start: 2022-05-07 | End: 2022-05-10 | Stop reason: HOSPADM

## 2022-05-07 RX ORDER — BENZONATATE 100 MG/1
100 CAPSULE ORAL 3 TIMES DAILY PRN
Status: DISCONTINUED | OUTPATIENT
Start: 2022-05-07 | End: 2022-05-10 | Stop reason: HOSPADM

## 2022-05-07 RX ORDER — MELOXICAM 15 MG/1
7.5 TABLET ORAL DAILY
Status: DISCONTINUED | OUTPATIENT
Start: 2022-05-08 | End: 2022-05-10 | Stop reason: HOSPADM

## 2022-05-07 RX ORDER — ASPIRIN 81 MG/1
81 TABLET ORAL DAILY
Status: DISCONTINUED | OUTPATIENT
Start: 2022-05-08 | End: 2022-05-10 | Stop reason: HOSPADM

## 2022-05-07 RX ORDER — MECLIZINE HCL 12.5 MG/1
25 TABLET ORAL DAILY
Status: DISCONTINUED | OUTPATIENT
Start: 2022-05-08 | End: 2022-05-10 | Stop reason: HOSPADM

## 2022-05-07 RX ORDER — ONDANSETRON 2 MG/ML
4 INJECTION INTRAMUSCULAR; INTRAVENOUS EVERY 6 HOURS PRN
Status: DISCONTINUED | OUTPATIENT
Start: 2022-05-07 | End: 2022-05-10 | Stop reason: HOSPADM

## 2022-05-07 RX ORDER — AMLODIPINE BESYLATE 5 MG/1
10 TABLET ORAL DAILY
Status: DISCONTINUED | OUTPATIENT
Start: 2022-05-08 | End: 2022-05-07

## 2022-05-07 RX ORDER — POTASSIUM CHLORIDE 20 MEQ/1
40 TABLET, EXTENDED RELEASE ORAL ONCE
Status: COMPLETED | OUTPATIENT
Start: 2022-05-07 | End: 2022-05-07

## 2022-05-07 RX ADMIN — ATORVASTATIN CALCIUM 40 MG: 40 TABLET, FILM COATED ORAL at 19:46

## 2022-05-07 RX ADMIN — Medication 5 MG: at 21:25

## 2022-05-07 RX ADMIN — CYCLOBENZAPRINE HYDROCHLORIDE 5 MG: 5 TABLET, FILM COATED ORAL at 21:26

## 2022-05-07 RX ADMIN — POTASSIUM CHLORIDE 40 MEQ: 1500 TABLET, EXTENDED RELEASE ORAL at 19:52

## 2022-05-07 RX ADMIN — BENZONATATE 100 MG: 100 CAPSULE ORAL at 19:46

## 2022-05-07 NOTE — Clinical Note
Allison Nesbitt was seen and treated in our emergency department on 5/7/2022  Diagnosis:     Rika Pantoja  may return to work on return date  She may return on this date: 05/09/2022         If you have any questions or concerns, please don't hesitate to call        Lashawn Rodriguez RN    ______________________________           _______________          _______________  [facility] Representative                              Date                                Time

## 2022-05-07 NOTE — EMTALA/ACUTE CARE TRANSFER
[facility] EMERGENCY DEPARTMENT  [address] [facility] [address]  [phone]  Dept: [facility] TRANSFER CONSENT    NAME Damian Minaya                                         1950                              MRN [id number]    I have been informed of my rights regarding examination, treatment, and transfer   by Dr Sheldon Vail DO    Benefits: Specialized equipment and/or services available at the receiving facility (Include comment)________________________ (ICU)    Risks:        Consent for Transfer:  I acknowledge that my medical condition has been evaluated and explained to me by the emergency department physician or other qualified medical person and/or my attending physician, who has recommended that I be transferred to the service of  Accepting Physician: Montrell Gleason at [address] Name, [address] : [facility] ICU  The above potential benefits of such transfer, the potential risks associated with such transfer, and the probable risks of not being transferred have been explained to me, and I fully understand them  The doctor has explained that, in my case, the benefits of transfer outweigh the risks  I agree to be transferred  I authorize the performance of emergency medical procedures and treatments upon me in both transit and upon arrival at the receiving facility  Additionally, I authorize the release of any and all medical records to the receiving facility and request they be transported with me, if possible  I understand that the safest mode of transportation during a medical emergency is an ambulance and that the Hospital advocates the use of this mode of transport  Risks of traveling to the receiving facility by car, including absence of medical control, life sustaining equipment, such as oxygen, and medical personnel has been explained to me and I fully understand them      (CARI CORRECT BOX BELOW)  [  ]  I consent to the stated transfer and to be transported by ambulance/helicopter  [  ]  I consent to the stated transfer, but refuse transportation by ambulance and accept full responsibility for my transportation by car  I understand the risks of non-ambulance transfers and I exonerate the Hospital and its staff from any deterioration in my condition that results from this refusal     X___________________________________________    DATE  22  TIME________  Signature of patient or legally responsible individual signing on patient behalf           RELATIONSHIP TO PATIENT_________________________          Provider Certification    NAME Karyle Chamberlain                                        [facility] 1950                              MRN [id number]    A medical screening exam was performed on the above named patient  Based on the examination:    Condition Necessitating Transfer The primary encounter diagnosis was Hypotension  Diagnoses of Dyspnea on exertion, Elevated troponin, and Elevated brain natriuretic peptide (BNP) level were also pertinent to this visit      Patient Condition: The patient has been stabilized such that within reasonable medical probability, no material deterioration of the patient condition or the condition of the unborn child(gagan) is likely to result from the transfer    Reason for Transfer: Level of Care needed not available at this facility    Transfer Requirements: [address] [place] [address] ICU   · Space available and qualified personnel available for treatment as acknowledged by LUCA Calhoun  · Agreed to accept transfer and to provide appropriate medical treatment as acknowledged by       Dr Rehman  · Appropriate medical records of the examination and treatment of the patient are provided at the time of transfer   [address] [facility], [address] _______  · Transfer will be performed by qualified personnel from    and appropriate transfer equipment as required, including the use of necessary and appropriate life support measures  Provider Certification: I have examined the patient and explained the following risks and benefits of being transferred/refusing transfer to the patient/family:         Based on these reasonable risks and benefits to the patient and/or the unborn child(gagan), and based upon the information available at the time of the patients examination, I certify that the medical benefits reasonably to be expected from the provision of appropriate medical treatments at another medical facility outweigh the increasing risks, if any, to the individuals medical condition, and in the case of labor to the unborn child, from effecting the transfer      X____________________________________________ DATE 05/07/22        TIME_______      ORIGINAL - SEND TO MEDICAL RECORDS   COPY - SEND WITH PATIENT DURING TRANSFER

## 2022-05-07 NOTE — ED PROVIDER NOTES
History  Chief Complaint   Patient presents with    Neck Pain     pt arrives with daughter stating "I slipped and fell on a rug during the night and today I woke up with a pain on my neck "  pt appears short of breath, daughter states " this is a new level of shortness of breath, she was just at [facility] because she was sick      67 y/o female, history of carotid artery disease s/p cardiac stenting, diabetes, HTN, Hep C, HLD, peripheral vascular disease, iliac artery stent insertion b/l, previously intubated for COVID19 during which time an intercerebral hemorrhage occurred without acute focal deficits, who presents today for evaluation of posterior head pain and neck pain and dyspnea  Patient reports she had a fall last night, denies losing consciousness, reports no numbness/tingling, vomiting  Patient reports she has had dyspnea and chest / 'heart' pain this morning as well  Patient is accompanied by her daughter who reports the patient has been having worsening shortness of breath unable to complete her typical tasks  Patient reports she had the flu in mid April and was hospitalized for shortness of breath, however states this dyspnea is worse than her symptoms with the flu  History provided by:  Patient (daughter)   used: No    Shortness of Breath  Severity:  Moderate  Onset quality:  Gradual  Duration:  1 week  Timing:  Constant  Progression:  Worsening  Chronicity:  New  Relieved by:  None tried  Worsened by: Movement and exertion  Ineffective treatments:  None tried  Associated symptoms: chest pain, headaches and neck pain    Associated symptoms: no abdominal pain, no ear pain, no fever, no rash, no sore throat and no vomiting        Prior to Admission Medications   Prescriptions Last Dose Informant Patient Reported? Taking?    Furosemide (LASIX PO) Unknown at Unknown time Self Yes No   Sig: Take 20 mg by mouth daily     Melatonin 5 MG TABS Unknown at Unknown time Self Yes No Sig: Take 5 mg by mouth   RA COUGH DM 30 MG/5ML SUER Unknown at Unknown time Self Yes No   Sig: take 10 milliliters by mouth twice a day   acetaminophen (TYLENOL) 325 mg tablet Unknown at Unknown time Self No No   Sig: Take 2 tablets by mouth every 6 (six) hours as needed for headaches or fever   Patient not taking: Reported on 4/30/2020   amLODIPine (NORVASC) 10 mg tablet Unknown at Unknown time Self Yes No   Sig: Take 10 mg by mouth daily   aspirin (ECOTRIN LOW STRENGTH) 81 mg EC tablet Unknown at Unknown time Self No No   Sig: Take 1 tablet (81 mg total) by mouth daily   atorvastatin (LIPITOR) 10 mg tablet Unknown at Unknown time Self Yes No   Sig: Lipitor 10 MG Oral Tablet   Refills: 0    Active- 1 tab daily   celecoxib (CeleBREX) 200 mg capsule   No No   Sig: Take 1 capsule by mouth daily for 30 days   Patient not taking: Reported on 7/13/2020   cyclobenzaprine (FLEXERIL) 10 mg tablet Unknown at Unknown time Self Yes No   Sig: Cyclobenzaprine HCl - 10 MG Oral Tablet   Refills: 0    Active- 1 tab q hs prn - pt reporting taking 0 5 tablet if needed   docusate sodium (COLACE) 100 mg capsule Unknown at Unknown time Self No No   Sig: Take 1 capsule by mouth 2 (two) times a day as needed for constipation   Patient not taking: Reported on 7/13/2020   fluticasone (FLONASE) 50 mcg/act nasal spray Unknown at Unknown time Self Yes No   Sig: Fluticasone Propionate 50 MCG/ACT Nasal Suspension   Refills: 0    Active- 2 sprays each nostril daily prn   lansoprazole (PREVACID) 30 mg capsule Unknown at Unknown time Self Yes No   Sig: Take 30 mg by mouth daily   meclizine (ANTIVERT) 25 mg tablet Unknown at Unknown time Self Yes No   Sig: Take 25 mg by mouth daily    meloxicam (MOBIC) 7 5 mg tablet Unknown at Unknown time Self Yes No   Sig: Take 7 5 mg by mouth daily   metFORMIN (GLUCOPHAGE) 850 mg tablet Unknown at Unknown time Self Yes No   Sig: Take 850 mg by mouth 2 (two) times a day with meals   montelukast (SINGULAIR) 10 mg tablet  Self Yes No   Sig: Take 10 mg by mouth   oxyCODONE (ROXICODONE) 5 mg immediate release tablet Unknown at Unknown time Self No No   Sig: Take 1-2 tablets by mouth every 4 (four) hours as needed for moderate pain Max Daily Amount: 60 mg   Patient not taking: Reported on 2020   traMADol (ULTRAM) 50 mg tablet Unknown at Unknown time Self No No   Sig: Take 1 tablet by mouth every 6 (six) hours as needed for moderate pain   Patient not taking: Reported on 2020      Facility-Administered Medications: None       Past Medical History:   Diagnosis Date    Acid reflux     Carotid artery disease (HCC)     Diabetes mellitus (HCC)     NIDDM    Edema     bles    Full dentures     History of hepatitis C     History of shingles     healed    Hx of renal calculi     Hyperlipidemia     Hypertension     Kidney stone     Liver disease     Nocturnal leg cramps     OA (osteoarthritis) of knee     left    PVD (peripheral vascular disease) ([address] )     S/P insertion of iliac artery stent     miles    Urinary incontinence     Wears glasses        Past Surgical History:   Procedure Laterality Date    APPENDECTOMY      COLONOSCOPY      ESOPHAGEAL DILATION      ESOPHAGOGASTRODUODENOSCOPY      ILIAC ARTERY STENT Bilateral     KIDNEY STONE SURGERY      DC TOTAL KNEE ARTHROPLASTY Left 3/7/2017    Procedure: ARTHROPLASTY KNEE TOTAL;  Surgeon: Violet Florez MD;  Location: [facility] OR;  Service: Orthopedics       Family History   Family history unknown: Yes     I have reviewed and agree with the history as documented      E-Cigarette/Vaping    E-Cigarette Use Never User      E-Cigarette/Vaping Substances    Nicotine No     THC No     CBD No     Flavoring No     Other No     Unknown No      Social History     Tobacco Use    Smoking status: Former Smoker     Quit date: 2001     Years since quittin 4    Smokeless tobacco: Never Used   Vaping Use    Vaping Use: Never used   Substance Use Topics  Alcohol use: Never    Drug use: No       Review of Systems   Constitutional: Positive for fatigue  Negative for chills and fever  HENT: Negative for congestion, ear pain, rhinorrhea and sore throat  Eyes: Negative for redness  Respiratory: Positive for shortness of breath  Negative for chest tightness  Cardiovascular: Positive for chest pain  Negative for palpitations  Gastrointestinal: Negative for abdominal pain, nausea and vomiting  Genitourinary: Negative for dysuria and hematuria  Musculoskeletal: Positive for neck pain  Skin: Negative for rash  Neurological: Positive for headaches  Negative for dizziness, syncope, light-headedness and numbness  Physical Exam  Physical Exam  Vitals and nursing note reviewed  Constitutional:       Appearance: She is well-developed  HENT:      Head: Normocephalic  Eyes:      General: No scleral icterus  Cardiovascular:      Rate and Rhythm: Normal rate and regular rhythm  Pulmonary:      Effort: Pulmonary effort is normal       Breath sounds: No stridor  Rales present  Abdominal:      General: There is no distension  Palpations: Abdomen is soft  Tenderness: There is no abdominal tenderness  Musculoskeletal:         General: Normal range of motion  Right lower leg: Edema present  Left lower leg: Edema present  Skin:     General: Skin is warm and dry  Capillary Refill: Capillary refill takes less than 2 seconds  Neurological:      Mental Status: She is alert and oriented to person, place, and time           Vital Signs  ED Triage Vitals   Temperature Pulse Respirations Blood Pressure SpO2   05/07/22 1005 05/07/22 1005 05/07/22 1005 05/07/22 1005 05/07/22 1005   98 2 °F (36 8 °C) 87 (!) 28 120/77 97 %      Temp Source Heart Rate Source Patient Position - Orthostatic VS BP Location FiO2 (%)   05/07/22 1005 05/07/22 1005 05/07/22 1005 05/07/22 1005 --   Tympanic Monitor Sitting Left arm       Pain Score 05/07/22 1831       No Pain           Vitals:    05/07/22 1700 05/07/22 1818 05/07/22 2300 05/08/22 0721   BP: 119/74 110/94 105/81 96/64   Pulse:  93 75 80   Patient Position - Orthostatic VS:  Lying Lying Lying         Visual Acuity      ED Medications  Medications   furosemide (LASIX) injection 40 mg (40 mg Intravenous Not Given 5/7/22 1146)   aspirin (ECOTRIN LOW STRENGTH) EC tablet 81 mg (81 mg Oral Given 5/8/22 0902)   atorvastatin (LIPITOR) tablet 40 mg (40 mg Oral Given 5/7/22 1946)   cyclobenzaprine (FLEXERIL) tablet 5 mg (5 mg Oral Given 5/7/22 2126)   pantoprazole (PROTONIX) EC tablet 40 mg (40 mg Oral Given 5/8/22 0510)   meclizine (ANTIVERT) tablet 25 mg (25 mg Oral Given 5/8/22 0902)   meloxicam (MOBIC) tablet 7 5 mg (7 5 mg Oral Given 5/8/22 0902)   ondansetron (ZOFRAN) injection 4 mg (has no administration in time range)   enoxaparin (LOVENOX) subcutaneous injection 40 mg (40 mg Subcutaneous Given 5/8/22 0902)   furosemide (LASIX) injection 40 mg (40 mg Intravenous Not Given 5/8/22 0854)   insulin lispro (HumaLOG) 100 units/mL subcutaneous injection 1-6 Units (1 Units Subcutaneous Not Given 5/8/22 0602)   insulin lispro (HumaLOG) 100 units/mL subcutaneous injection 1-6 Units (1 Units Subcutaneous Not Given 5/7/22 2131)   acetaminophen (TYLENOL) tablet 975 mg (975 mg Oral Given 5/8/22 0510)   traMADol (ULTRAM) tablet 50 mg (has no administration in time range)   oxyCODONE (ROXICODONE) IR tablet 5 mg (has no administration in time range)   benzonatate (TESSALON PERLES) capsule 100 mg (100 mg Oral Given 5/8/22 0510)   guaiFENesin (MUCINEX) 12 hr tablet 600 mg (600 mg Oral Given 5/8/22 0902)   melatonin tablet 3 mg (has no administration in time range)   potassium chloride (K-DUR,KLOR-CON) CR tablet 40 mEq (40 mEq Oral Given 5/7/22 1952)   potassium chloride (K-OMID,KLOR-CON) CR tablet 40 mEq (40 mEq Oral Given 5/8/22 6653)       Diagnostic Studies  Results Reviewed     Procedure Component Value Units Date/Time    Magnesium [[id number]]  (Normal) Collected: 05/08/22 0510    Lab Status: Final result Specimen: Blood from Hand, Right Updated: 05/08/22 0628     Magnesium 1 7 mg/dL     Phosphorus [[id number]]  (Normal) Collected: 05/08/22 0510    Lab Status: Final result Specimen: Blood from Hand, Right Updated: 05/08/22 0628     Phosphorus 3 3 mg/dL     Basic metabolic panel [[id number]]  (Abnormal) Collected: 05/08/22 0510    Lab Status: Final result Specimen: Blood from Hand, Right Updated: 05/08/22 7460     Sodium 140 mmol/L      Potassium 3 5 mmol/L      Chloride 109 mmol/L      CO2 24 mmol/L      ANION GAP 7 mmol/L      BUN 4 mg/dL      Creatinine 0 73 mg/dL      Glucose 128 mg/dL      Calcium 8 5 mg/dL      eGFR 82 ml/min/1 73sq m     Narrative:      [place] guidelines for Chronic Kidney Disease (CKD):     Stage 1 with normal or high GFR (GFR > 90 mL/min/1 73 square meters)    Stage 2 Mild CKD (GFR = 60-89 mL/min/1 73 square meters)    Stage 3A Moderate CKD (GFR = 45-59 mL/min/1 73 square meters)    Stage 3B Moderate CKD (GFR = 30-44 mL/min/1 73 square meters)    Stage 4 Severe CKD (GFR = 15-29 mL/min/1 73 square meters)    Stage 5 End Stage CKD (GFR <15 mL/min/1 73 square meters)  Note: GFR calculation is accurate only with a steady state creatinine    CBC and differential [[id number]]  (Abnormal) Collected: 05/08/22 0510    Lab Status: Final result Specimen: Blood from Hand, Right Updated: 05/08/22 0620     WBC 6 61 Thousand/uL      RBC 3 69 Million/uL      Hemoglobin 10 7 g/dL      Hematocrit 35 0 %      MCV 95 fL      MCH 29 0 pg      MCHC 30 6 g/dL      RDW 15 5 %      MPV 11 0 fL      Platelets 033 Thousands/uL      nRBC 0 /100 WBCs      Neutrophils Relative 56 %      Immat GRANS % 1 %      Lymphocytes Relative 30 %      Monocytes Relative 10 %      Eosinophils Relative 2 %      Basophils Relative 1 %      Neutrophils Absolute 3 78 Thousands/µL      Immature Grans Absolute 0 03 Thousand/uL      Lymphocytes Absolute 1 99 Thousands/µL      Monocytes Absolute 0 66 Thousand/µL      Eosinophils Absolute 0 11 Thousand/µL      Basophils Absolute 0 04 Thousands/µL     Platelet count [[id number]]     Lab Status: No result Specimen: Blood     HS Troponin I 4hr [[id number]]  (Abnormal) Collected: 05/07/22 1628    Lab Status: Final result Specimen: Blood from Arm, Right Updated: 05/07/22 1657     hs TnI 4hr 111 ng/L      Delta 4hr hsTnI -28 ng/L     HS Troponin I 2hr [[id number]]  (Abnormal) Collected: 05/07/22 1254    Lab Status: Final result Specimen: Blood from Arm, Right Updated: 05/07/22 1321     hs TnI 2hr 132 ng/L      Delta 2hr hsTnI -7 ng/L     HS Troponin 0hr (reflex protocol) [[id number]]  (Abnormal) Collected: 05/07/22 1050    Lab Status: Final result Specimen: Blood from Arm, Right Updated: 05/07/22 1135     hs TnI 0hr 139 ng/L     Urine Microscopic [[id number]]  (Normal) Collected: 05/07/22 1115    Lab Status: Final result Specimen: Urine, Other Updated: 05/07/22 1135     RBC, UA 1-2 /hpf      WBC, UA 0-1 /hpf      Epithelial Cells Occasional /hpf      Bacteria, UA Occasional /hpf     UA (URINE) with reflex to Scope [[id number]]  (Abnormal) Collected: 05/07/22 1115    Lab Status: Final result Specimen: Urine, Other Updated: 05/07/22 1125     Color, UA Nanda     Clarity, UA Clear     Specific Gravity, UA 1 015     pH, UA 6 0     Leukocytes, UA 25 0     Nitrite, UA Negative     Protein, UA 15 (Trace) mg/dl      Glucose, UA Negative mg/dl      Ketones, UA Negative mg/dl      Bilirubin, UA Negative     Blood, UA 25 0     UROBILINOGEN UA Negative mg/dL     Phosphorus [[id number]]  (Normal) Collected: 05/07/22 1014    Lab Status: Final result Specimen: Blood from Arm, Right Updated: 05/07/22 1042     Phosphorus 3 2 mg/dL     Narrative:      Hemolysis    NT-BNP PRO [[id number]]  (Abnormal) Collected: 05/07/22 1014    Lab Status: Final result Specimen: Blood from Arm, Right Updated: 05/07/22 1042 NT-proBNP 5,390 pg/mL     Magnesium [[id number]]  (Normal) Collected: 05/07/22 1014    Lab Status: Final result Specimen: Blood from Arm, Right Updated: 05/07/22 1041     Magnesium 1 7 mg/dL     Narrative:      Hemolysis    Comprehensive metabolic panel [[id number]]  (Abnormal) Collected: 05/07/22 1014    Lab Status: Final result Specimen: Blood from Arm, Right Updated: 05/07/22 1041     Sodium 141 mmol/L      Potassium 3 4 mmol/L      Chloride 107 mmol/L      CO2 26 mmol/L      ANION GAP 8 mmol/L      BUN 3 mg/dL      Creatinine 0 77 mg/dL      Glucose 140 mg/dL      Calcium 8 5 mg/dL      AST 29 U/L      ALT 19 U/L      Alkaline Phosphatase 88 U/L      Total Protein 7 5 g/dL      Albumin 3 8 g/dL      Total Bilirubin 1 46 mg/dL      eGFR 77 ml/min/1 73sq m     Narrative:      Hemolysis  [employer] guidelines for Chronic Kidney Disease (CKD):     Stage 1 with normal or high GFR (GFR > 90 mL/min/1 73 square meters)    Stage 2 Mild CKD (GFR = 60-89 mL/min/1 73 square meters)    Stage 3A Moderate CKD (GFR = 45-59 mL/min/1 73 square meters)    Stage 3B Moderate CKD (GFR = 30-44 mL/min/1 73 square meters)    Stage 4 Severe CKD (GFR = 15-29 mL/min/1 73 square meters)    Stage 5 End Stage CKD (GFR <15 mL/min/1 73 square meters)  Note: GFR calculation is accurate only with a steady state creatinine    D-Dimer [[id number]]  (Abnormal) Collected: 05/07/22 1014    Lab Status: Final result Specimen: Blood from Arm, Right Updated: 05/07/22 1041     D-Dimer, Quant 2 19 ug/ml FEU     Narrative: In the evaluation for possible pulmonary embolism, in the appropriate (Well's Score of 4 or less) patient, the age adjusted d-dimer cutoff for this patient can be calculated as:    Age x 0 01 (in ug/mL) for Age-adjusted D-dimer exclusion threshold for a patient over 50 years      CBC and differential [[id number]]  (Abnormal) Collected: 05/07/22 1014    Lab Status: Final result Specimen: Blood from Arm, Right Updated: 05/07/22 1021     WBC 7 38 Thousand/uL      RBC 4 00 Million/uL      Hemoglobin 11 8 g/dL      Hematocrit 37 4 %      MCV 94 fL      MCH 29 5 pg      MCHC 31 6 g/dL      RDW 15 4 %      MPV 11 3 fL      Platelets 973 Thousands/uL      nRBC 0 /100 WBCs      Neutrophils Relative 69 %      Immat GRANS % 0 %      Lymphocytes Relative 22 %      Monocytes Relative 8 %      Eosinophils Relative 1 %      Basophils Relative 0 %      Neutrophils Absolute 4 97 Thousands/µL      Immature Grans Absolute 0 03 Thousand/uL      Lymphocytes Absolute 1 65 Thousands/µL      Monocytes Absolute 0 60 Thousand/µL      Eosinophils Absolute 0 10 Thousand/µL      Basophils Absolute 0 03 Thousands/µL                  XR chest 2 views   Final Result by Deena Emmanuel MD (05/07 2125)      Bilateral lower lobe predominant hazy pulmonary infiltrates are seen which could reflect a diffuse infectious/inflammatory process of the lung parenchyma including viral pneumonia, recommend clinical correlation  Workstation performed: [id number]         CT head without contrast   Final Result by Corin Garnica DO (05/07 1100)   Mild cerebral atrophy with chronic small vessel ischemic white matter disease  No acute intracranial abnormality  Workstation performed: [id number]         CT spine cervical without contrast   Final Result by Corin Garnica DO (05/07 1104)   Degenerative changes of the cervical spine  No acute cervical spine fracture or traumatic malalignment                     Workstation performed: [id number]                    Procedures  ECG 12 Lead Documentation Only    Date/Time: 5/7/2022 10:17 AM  Performed by: Emil Anders PA-C  Authorized by: Emil Anders PA-C     Indications / Diagnosis:  Dyspnea  ECG reviewed by me, the ED Provider: yes    Patient location:  ED  Previous ECG:     Previous ECG:  Compared to current  Interpretation:     Interpretation: normal    Rate:     ECG rate:  85    ECG rate assessment: normal    Rhythm:     Rhythm: sinus rhythm    Ectopy:     Ectopy: none    QRS:     QRS axis:  Left    QRS intervals:  Normal  Conduction:     Conduction: normal    ST segments:     ST segments:  Normal  T waves:     T waves: inverted      Inverted:  I, aVL, V5, V6 and II  Q waves:     Q waves:  V1, V2 and V3  Other findings:     Other findings: prolonged qTc interval    Comments:        QRS 78                 ED Course  ED Course as of 05/08/22 0909   Sat May 07, 2022   1050 D-Dimer, Quant(!): 2 19  Dimer was accidentally ordered, this is an expected elevated result due to patient's past medical history  Suspicion for PE is VERY LOW, patient is already anti-coagulated on eliquis, therefore no CT PE study will be ordered based upon this result  1141 hs TnI 0hr(!): 139   6704 Dr Loredo recommends cardiology consuiltation   7070 Patient with no fevers or infectious symptoms  Extremely low clinical suspicion for sepsis/infectious cause for the patient's symptoms, much more likely to be related to the patient's cardiogenic medical conditions  SBIRT 20yo+      Most Recent Value   SBIRT (22 yo +)    In order to provide better care to our patients, we are screening all of our patients for alcohol and drug use  Would it be okay to ask you these screening questions? Yes Filed at: 05/07/2022 1019   Initial Alcohol Screen: US AUDIT-C     1  How often do you have a drink containing alcohol? 0 Filed at: 05/07/2022 1019   2  How many drinks containing alcohol do you have on a typical day you are drinking? 0 Filed at: 05/07/2022 1019   3b  FEMALE Any Age, or MALE 65+: How often do you have 4 or more drinks on one occassion? 0 Filed at: 05/07/2022 1019   Audit-C Score 0 Filed at: 05/07/2022 1019   KOBY: How many times in the past year have you        Used an illegal drug or used a prescription medication for non-medical reasons?  Never Filed at: 05/07/2022 1019        JOELLE Risk Score      Most Recent Value   Age >= 72 1 Filed at: 05/07/2022 1803   Known CAD (stenosis >= 50%) 1 Filed at: 05/07/2022 1803   Recent (<=24 hrs) Service Angina 0 Filed at: 05/07/2022 1803   ST Deviation >= 0 5 mm 0 Filed at: 05/07/2022 1803   3+ CAD Risk Factors (FHx, HTN, HLP, DM, Smoker) 1 Filed at: 05/07/2022 1803   Aspirin Use Past 7 Days 1 Filed at: 05/07/2022 1803   Elevated Cardiac Markers 1 Filed at: 05/07/2022 1803   JOELLE Risk Score (Calculated) 5 Filed at: 05/07/2022 1803                  MDM      Disposition  Final diagnoses:   Hypotension   Dyspnea on exertion   Elevated troponin   Elevated brain natriuretic peptide (BNP) level     Time reflects when diagnosis was documented in both MDM as applicable and the Disposition within this note     Time User Action Codes Description Comment    5/7/2022 12:34 PM Tam Morales [I95 9] Hypotension     5/7/2022 12:34 PM Nelson Degroot [R06 00] Dyspnea on exertion     5/7/2022 12:34 PM Nelson Degroot [R77 8] Elevated troponin     5/7/2022 12:34 PM Tata FU [R79 89] Elevated brain natriuretic peptide (BNP) level     5/7/2022  5:54 PM Devorah Martinez [I21 4] NSTEMI (non-ST elevated myocardial infarction) [facility])       ED Disposition     ED Disposition Condition Date/Time Comment    Admit Stable Sat May 7, 2022  5:52 PM Admit obs under Nicole Simms MD Documentation      Most Recent Value   Patient Condition The patient has been stabilized such that within reasonable medical probability, no material deterioration of the patient condition or the condition of the unborn child(gagan) is likely to result from the transfer   Reason for Transfer Level of Care needed not available at this facility   Benefits of Transfer Specialized equipment and/or services available at the receiving facility (Include comment)________________________  [ICU]   Accepting Physician Anirudh Toscano Accepting Facility Name, [address] [place] [address],B-1 ICU    (Name & Tel number) [facility] PACS   Sending MD [address] [facility] Name, [address] [place] [address],-1 ICU    (Name & Tel number) [facility] ARAYA      Follow-up Information    None         Current Discharge Medication List      CONTINUE these medications which have NOT CHANGED    Details   acetaminophen (TYLENOL) 325 mg tablet Take 2 tablets by mouth every 6 (six) hours as needed for headaches or fever  Qty: 30 tablet, Refills: 0      amLODIPine (NORVASC) 10 mg tablet Take 10 mg by mouth daily      aspirin (ECOTRIN LOW STRENGTH) 81 mg EC tablet Take 1 tablet (81 mg total) by mouth daily  Qty: 90 tablet, Refills: 0    Associated Diagnoses: PAD (peripheral artery disease) (HCC)      atorvastatin (LIPITOR) 10 mg tablet Lipitor 10 MG Oral Tablet   Refills: 0    Active- 1 tab daily      celecoxib (CeleBREX) 200 mg capsule Take 1 capsule by mouth daily for 30 days  Qty: 30 capsule, Refills: 0      cyclobenzaprine (FLEXERIL) 10 mg tablet Cyclobenzaprine HCl - 10 MG Oral Tablet   Refills: 0    Active- 1 tab q hs prn - pt reporting taking 0 5 tablet if needed      docusate sodium (COLACE) 100 mg capsule Take 1 capsule by mouth 2 (two) times a day as needed for constipation  Qty: 60 capsule, Refills: 0      fluticasone (FLONASE) 50 mcg/act nasal spray Fluticasone Propionate 50 MCG/ACT Nasal Suspension   Refills: 0    Active- 2 sprays each nostril daily prn      Furosemide (LASIX PO) Take 20 mg by mouth daily        lansoprazole (PREVACID) 30 mg capsule Take 30 mg by mouth daily      meclizine (ANTIVERT) 25 mg tablet Take 25 mg by mouth daily       Melatonin 5 MG TABS Take 5 mg by mouth      meloxicam (MOBIC) 7 5 mg tablet Take 7 5 mg by mouth daily      metFORMIN (GLUCOPHAGE) 850 mg tablet Take 850 mg by mouth 2 (two) times a day with meals      montelukast (SINGULAIR) 10 mg tablet Take 10 mg by mouth      oxyCODONE (ROXICODONE) 5 mg immediate release tablet Take 1-2 tablets by mouth every 4 (four) hours as needed for moderate pain Max Daily Amount: 60 mg  Qty: 30 tablet, Refills: 0      RA COUGH DM 30 MG/5ML SUER take 10 milliliters by mouth twice a day      traMADol (ULTRAM) 50 mg tablet Take 1 tablet by mouth every 6 (six) hours as needed for moderate pain  Qty: 30 tablet, Refills: 0             No discharge procedures on file      PDMP Review     None          ED Provider  Electronically Signed by           Emil Anders PA-C  05/08/22 2938

## 2022-05-07 NOTE — ASSESSMENT & PLAN NOTE
Wt Readings from Last 3 Encounters:   05/07/22 89 kg (196 lb 3 4 oz)   03/29/21 88 9 kg (196 lb)   07/13/20 80 3 kg (177 lb)   · Perhaps in slight exacerbation in the setting of elevated BNP and lower extremity swelling  · Chest x-ray with slight pulmonary vascular congestion  · Most recent ejection fraction 50% with mild diastolic dysfunction  · Endorses mild shortness of breath and dyspnea on exertion  · 2D echocardiogram as above  · Lasix 40 mg IV daily  · Strict intake and output  · Daily standing weights

## 2022-05-07 NOTE — ASSESSMENT & PLAN NOTE
· HS troponin I 139-->132-->111  · JOELLE score of 5  · EKG with no signs of active ischemia  · Possibly NSTEMI as patient does have a history of coronary artery disease on recent cardiac catheterization  · Cardiology recommends admission for ACS rule out  · No need to further trend troponin I  · Obtain 2D echocardiogram  · Monitor for anginal symptoms  · Cardiology consult ; appreciate recommendations

## 2022-05-07 NOTE — ED NOTES
Patient is resting comfortably  Provider aware of low blood pressure readings and manual blood pressure taken by this RN  Pt denies CP, dizziness, or any changes  Continuing to monitor patient and trend vitals  Lasix not given as order parameters not met             Ej Adames RN  05/07/22 6479

## 2022-05-07 NOTE — ASSESSMENT & PLAN NOTE
Lab Results   Component Value Date    HGBA1C 6 5 (H) 03/30/2022       No results for input(s): POCGLU in the last 72 hours      Blood Sugar Average: Last 72 hrs:     · Hold home oral hypoglycemic agents  · SSI & Accu-Cheks

## 2022-05-07 NOTE — H&P
[facility]  H&P- Sena Bare [birth year], 66 y o  female MRN: [id number]  Unit/Bed#: ED 10 Encounter: [id number]  Primary Care Provider: Jennifer Gonzales MD   Date and time admitted to hospital: 5/7/2022  9:49 AM    * NSTEMI (non-ST elevated myocardial infarction) [facility])  Assessment & Plan  · HS troponin I 139-->132-->111  · JOELLE score of 5  · EKG with no signs of active ischemia  · Possibly NSTEMI as patient does have a history of coronary artery disease on recent cardiac catheterization  · Cardiology recommends admission for ACS rule out  · No need to further trend troponin I  · Obtain 2D echocardiogram  · Monitor for anginal symptoms  · Cardiology consult ; appreciate recommendations    Acute on chronic combined systolic and diastolic heart failure ([facility] )  Assessment & Plan  Wt Readings from Last 3 Encounters:   05/07/22 89 kg (196 lb 3 4 oz)   03/29/21 88 9 kg (196 lb)   07/13/20 80 3 kg (177 lb)   · Perhaps in slight exacerbation in the setting of elevated BNP and lower extremity swelling  · Chest x-ray with slight pulmonary vascular congestion  · Most recent ejection fraction 50% with mild diastolic dysfunction  · Endorses mild shortness of breath and dyspnea on exertion  · 2D echocardiogram as above  · Lasix 40 mg IV daily  · Strict intake and output  · Daily standing weights        Diabetes mellitus ([facility] )  Assessment & Plan  Lab Results   Component Value Date    HGBA1C 6 5 (H) 03/30/2022       No results for input(s): POCGLU in the last 72 hours      Blood Sugar Average: Last 72 hrs:     · Hold home oral hypoglycemic agents  · SSI & Accu-Cheks    Hypertension  Assessment & Plan  · Blood pressures initially soft on presentation  · Hold home amlodipine in the setting of hypotension and lower extremity swelling  · Monitor blood pressures    VTE Prophylaxis:  Lovenox  Code Status: Level 1 Full Code    Anticipated Length of Stay:  Patient will be admitted on an Observation basis with an anticipated length of stay of  2 midnights  Justification for Hospital Stay:  ACS rule out    Total Time for Visit, including Counseling / Coordination of Care: 60 minutes  Greater than 50% of this total time spent on direct patient counseling and coordination of care  Chief Complaint:  Dyspnea      History of Present Illness:    Stepan Jo is a 66 y o  female with significant vasculopathy, heart failure with preserved ejection fraction, type 2 diabetes mellitus, hypertension who initially presented with neck pain and dyspnea on exertion  The patient is a significant vasculopath in the setting of iliac artery stenting and peripheral artery disease  She does who presents with complaints of neck pain as she did undergo a fall last night  The patient also endorses mild chest pain this morning as well  In the ED, troponin I's elevated in the 130s which have since down trended  EKG with no signs of active ischemia  The patient was initially hypotensive and thought to perhaps be in cardiogenic shock secondary to NSTEMI however blood pressures have improved  Cardiology recommended admission for monitoring of HS troponin I and 2D echocardiogram       Review of Systems:    Review of Systems   Constitutional: Negative for chills and fever  HENT: Negative for ear pain and sore throat  Eyes: Negative for pain and visual disturbance  Respiratory: Positive for shortness of breath  Negative for cough  Cardiovascular: Positive for chest pain  Negative for palpitations  Gastrointestinal: Negative for abdominal pain and vomiting  Genitourinary: Negative for dysuria and hematuria  Musculoskeletal: Negative for arthralgias and back pain  Skin: Negative for color change and rash  Neurological: Negative for seizures and syncope  All other systems reviewed and are negative        Past Medical and Surgical History:     Past Medical History:   Diagnosis Date    Acid reflux     Carotid artery disease ([facility] )     Diabetes mellitus ([facility] )     NIDDM    Edema     bles    Full dentures     History of hepatitis C     History of shingles     healed    Hx of renal calculi     Hyperlipidemia     Hypertension     Kidney stone     Liver disease     Nocturnal leg cramps     OA (osteoarthritis) of knee     left    PVD (peripheral vascular disease) ([facility] )     S/P insertion of iliac artery stent     miles    Urinary incontinence     Wears glasses        Past Surgical History:   Procedure Laterality Date    APPENDECTOMY      COLONOSCOPY      ESOPHAGEAL DILATION      ESOPHAGOGASTRODUODENOSCOPY      ILIAC ARTERY STENT Bilateral     KIDNEY STONE SURGERY      UT TOTAL KNEE ARTHROPLASTY Left 3/7/2017    Procedure: ARTHROPLASTY KNEE TOTAL;  Surgeon: Kyle Grace MD;  Location: [facility];  Service: Orthopedics       Meds/Allergies:    Prior to Admission medications    Medication Sig Start Date End Date Taking?  Authorizing Provider   acetaminophen (TYLENOL) 325 mg tablet Take 2 tablets by mouth every 6 (six) hours as needed for headaches or fever  Patient not taking: Reported on 4/30/2020 1/0/30   Kyle Grace MD   amLODIPine (NORVASC) 10 mg tablet Take 10 mg by mouth daily    Historical Provider, MD   aspirin (ECOTRIN LOW STRENGTH) 81 mg EC tablet Take 1 tablet (81 mg total) by mouth daily 4/30/20   NATALIA Noel   atorvastatin (LIPITOR) 10 mg tablet Lipitor 10 MG Oral Tablet   Refills: 0    Active- 1 tab daily    Historical Provider, MD   celecoxib (CeleBREX) 200 mg capsule Take 1 capsule by mouth daily for 30 days  Patient not taking: Reported on 7/13/2020 3/8/17 2/7/38  Kyle Grace MD   cyclobenzaprine (FLEXERIL) 10 mg tablet Cyclobenzaprine HCl - 10 MG Oral Tablet   Refills: 0    Active- 1 tab q hs prn - pt reporting taking 0 5 tablet if needed    Historical Provider, MD   docusate sodium (COLACE) 100 mg capsule Take 1 capsule by mouth 2 (two) times a day as needed for constipation  Patient not taking: Reported on 2020   Jory Ponce MD   fluticasone [facility]) 50 mcg/act nasal spray Fluticasone Propionate 50 MCG/ACT Nasal Suspension   Refills: 0    Active- 2 sprays each nostril daily prn    Historical Provider, MD   Furosemide (LASIX PO) Take 20 mg by mouth daily      Historical Provider, MD   lansoprazole (PREVACID) 30 mg capsule Take 30 mg by mouth daily    Historical Provider, MD   meclizine (ANTIVERT) 25 mg tablet Take 25 mg by mouth daily  20   Historical Provider, MD   Melatonin 5 MG TABS Take 5 mg by mouth 20   Historical Provider, MD   meloxicam (MOBIC) 7 5 mg tablet Take 7 5 mg by mouth daily 4/3/20   Historical Provider, MD   metFORMIN (GLUCOPHAGE) 850 mg tablet Take 850 mg by mouth 2 (two) times a day with meals 3/26/21   Historical Provider, MD   montelukast (SINGULAIR) 10 mg tablet Take 10 mg by mouth 7/7/20 3/29/21  Historical Provider, MD   oxyCODONE (ROXICODONE) 5 mg immediate release tablet Take 1-2 tablets by mouth every 4 (four) hours as needed for moderate pain Max Daily Amount: 60 mg  Patient not taking: Reported on 2020   Jory Ponce MD   RA COUGH DM 30 MG/5ML SUER take 10 milliliters by mouth twice a day 20   Historical Provider, MD   traMADol [place]) 50 mg tablet Take 1 tablet by mouth every 6 (six) hours as needed for moderate pain  Patient not taking: Reported on 2020   Jory Ponce MD       Allergies:    Allergies   Allergen Reactions    Codeine GI Intolerance    Compazine [Prochlorperazine] Other (See Comments)     Eyes rolled up in head    Tramadol GI Intolerance       Social History:     Marital Status: Single   Substance Use History:   Social History     Substance and Sexual Activity   Alcohol Use No     Social History     Tobacco Use   Smoking Status Former Smoker    Quit date: 2001    Years since quittin 4   Smokeless Tobacco Never Used     Social History     Substance and Sexual Activity   Drug Use No Family History:    Pertinent family history reviewed    Physical Exam:     Vitals:   Blood Pressure: 119/74 (05/07/22 1700)  Pulse: 72 (05/07/22 1630)  Temperature: 98 2 °F (36 8 °C) (05/07/22 1005)  Temp Source: Tympanic (05/07/22 1005)  Respirations: 20 (05/07/22 1630)  Weight - Scale: 89 kg (196 lb 3 4 oz) (05/07/22 1005)  SpO2: 94 % (05/07/22 1630)    Physical Exam  Vitals and nursing note reviewed  Constitutional:       General: She is not in acute distress  Appearance: She is well-developed  HENT:      Head: Normocephalic and atraumatic  Eyes:      Conjunctiva/sclera: Conjunctivae normal    Cardiovascular:      Rate and Rhythm: Normal rate and regular rhythm  Heart sounds: No murmur heard  Pulmonary:      Effort: Pulmonary effort is normal  No respiratory distress  Breath sounds: Normal breath sounds  Abdominal:      Palpations: Abdomen is soft  Tenderness: There is no abdominal tenderness  Musculoskeletal:      Cervical back: Neck supple  Right lower leg: Edema present  Left lower leg: Edema present  Skin:     General: Skin is warm and dry  Neurological:      Mental Status: She is alert            Additional Data:     Lab Results: I have reviewed pertinent results     Results from last 7 days   Lab Units 05/07/22  1014   WBC Thousand/uL 7 38   HEMOGLOBIN g/dL 11 8   HEMATOCRIT % 37 4   PLATELETS Thousands/uL 154   NEUTROS PCT % 69   LYMPHS PCT % 22   MONOS PCT % 8   EOS PCT % 1     Results from last 7 days   Lab Units 05/07/22  1014   SODIUM mmol/L 141   POTASSIUM mmol/L 3 4*   CHLORIDE mmol/L 107   CO2 mmol/L 26   BUN mg/dL 3*   CREATININE mg/dL 0 77   ANION GAP mmol/L 8   CALCIUM mg/dL 8 5   ALBUMIN g/dL 3 8   TOTAL BILIRUBIN mg/dL 1 46*   ALK PHOS U/L 88   ALT U/L 19   AST U/L 29   GLUCOSE RANDOM mg/dL 140*                       Imaging: I have reviewed pertinent imaging     CT head without contrast   Final Result by Kim Hinojosa DO (05/07 1100) Mild cerebral atrophy with chronic small vessel ischemic white matter disease  No acute intracranial abnormality  Workstation performed: [id number]         CT spine cervical without contrast   Final Result by Edwige Mayers DO (05/07 1104)   Degenerative changes of the cervical spine  No acute cervical spine fracture or traumatic malalignment  Workstation performed: [id number]         XR chest 2 views    (Results Pending)       EKG, Pathology, and Other Studies Reviewed on Admission:   · EKG: Reviewed     Allscripts / Epic Records Reviewed    ** Please Note: This note has been constructed using a voice recognition system   **

## 2022-05-07 NOTE — ED NOTES
Patient transported to [facility] Kathy Gonzalez, [address] [facility]  05/07/22 1039

## 2022-05-07 NOTE — ASSESSMENT & PLAN NOTE
· Blood pressures initially soft on presentation  · Hold home amlodipine in the setting of hypotension and lower extremity swelling  · Monitor blood pressures

## 2022-05-08 PROBLEM — J12.9 VIRAL PNEUMONIA: Status: ACTIVE | Noted: 2022-05-08

## 2022-05-08 LAB
ANION GAP SERPL CALCULATED.3IONS-SCNC: 7 MMOL/L (ref 5–14)
BASOPHILS # BLD AUTO: 0.04 THOUSANDS/ΜL (ref 0–0.1)
BASOPHILS NFR BLD AUTO: 1 % (ref 0–1)
BUN SERPL-MCNC: 4 MG/DL (ref 5–25)
CALCIUM SERPL-MCNC: 8.5 MG/DL (ref 8.4–10.2)
CHLORIDE SERPL-SCNC: 109 MMOL/L (ref 97–108)
CO2 SERPL-SCNC: 24 MMOL/L (ref 22–30)
CREAT SERPL-MCNC: 0.73 MG/DL (ref 0.6–1.2)
EOSINOPHIL # BLD AUTO: 0.11 THOUSAND/ΜL (ref 0–0.61)
EOSINOPHIL NFR BLD AUTO: 2 % (ref 0–6)
ERYTHROCYTE [DISTWIDTH] IN BLOOD BY AUTOMATED COUNT: 15.5 % (ref 11.6–15.1)
FLUAV RNA RESP QL NAA+PROBE: NEGATIVE
FLUBV RNA RESP QL NAA+PROBE: NEGATIVE
GFR SERPL CREATININE-BSD FRML MDRD: 82 ML/MIN/1.73SQ M
GLUCOSE SERPL-MCNC: 128 MG/DL (ref 65–140)
GLUCOSE SERPL-MCNC: 128 MG/DL (ref 70–99)
GLUCOSE SERPL-MCNC: 135 MG/DL (ref 65–140)
GLUCOSE SERPL-MCNC: 149 MG/DL (ref 65–140)
GLUCOSE SERPL-MCNC: 172 MG/DL (ref 65–140)
HCT VFR BLD AUTO: 35 % (ref 34.8–46.1)
HGB BLD-MCNC: 10.7 G/DL (ref 11.5–15.4)
IMM GRANULOCYTES # BLD AUTO: 0.03 THOUSAND/UL (ref 0–0.2)
IMM GRANULOCYTES NFR BLD AUTO: 1 % (ref 0–2)
LYMPHOCYTES # BLD AUTO: 1.99 THOUSANDS/ΜL (ref 0.6–4.47)
LYMPHOCYTES NFR BLD AUTO: 30 % (ref 14–44)
MAGNESIUM SERPL-MCNC: 1.7 MG/DL (ref 1.6–2.3)
MCH RBC QN AUTO: 29 PG (ref 26.8–34.3)
MCHC RBC AUTO-ENTMCNC: 30.6 G/DL (ref 31.4–37.4)
MCV RBC AUTO: 95 FL (ref 82–98)
MONOCYTES # BLD AUTO: 0.66 THOUSAND/ΜL (ref 0.17–1.22)
MONOCYTES NFR BLD AUTO: 10 % (ref 4–12)
NEUTROPHILS # BLD AUTO: 3.78 THOUSANDS/ΜL (ref 1.85–7.62)
NEUTS SEG NFR BLD AUTO: 56 % (ref 43–75)
NRBC BLD AUTO-RTO: 0 /100 WBCS
PHOSPHATE SERPL-MCNC: 3.3 MG/DL (ref 2.5–4.8)
PLATELET # BLD AUTO: 148 THOUSANDS/UL (ref 149–390)
PMV BLD AUTO: 11 FL (ref 8.9–12.7)
POTASSIUM SERPL-SCNC: 3.5 MMOL/L (ref 3.6–5)
RBC # BLD AUTO: 3.69 MILLION/UL (ref 3.81–5.12)
RSV RNA RESP QL NAA+PROBE: NEGATIVE
SARS-COV-2 RNA RESP QL NAA+PROBE: NEGATIVE
SODIUM SERPL-SCNC: 140 MMOL/L (ref 137–147)
WBC # BLD AUTO: 6.61 THOUSAND/UL (ref 4.31–10.16)

## 2022-05-08 PROCEDURE — 80048 BASIC METABOLIC PNL TOTAL CA: CPT | Performed by: INTERNAL MEDICINE

## 2022-05-08 PROCEDURE — 85025 COMPLETE CBC W/AUTO DIFF WBC: CPT | Performed by: INTERNAL MEDICINE

## 2022-05-08 PROCEDURE — 99223 1ST HOSP IP/OBS HIGH 75: CPT | Performed by: INTERNAL MEDICINE

## 2022-05-08 PROCEDURE — 99232 SBSQ HOSP IP/OBS MODERATE 35: CPT | Performed by: INTERNAL MEDICINE

## 2022-05-08 PROCEDURE — 82948 REAGENT STRIP/BLOOD GLUCOSE: CPT

## 2022-05-08 PROCEDURE — 83735 ASSAY OF MAGNESIUM: CPT | Performed by: INTERNAL MEDICINE

## 2022-05-08 PROCEDURE — 84100 ASSAY OF PHOSPHORUS: CPT | Performed by: INTERNAL MEDICINE

## 2022-05-08 PROCEDURE — 0241U HB NFCT DS VIR RESP RNA 4 TRGT: CPT | Performed by: INTERNAL MEDICINE

## 2022-05-08 RX ORDER — FUROSEMIDE 20 MG/1
20 TABLET ORAL DAILY
Status: DISCONTINUED | OUTPATIENT
Start: 2022-05-08 | End: 2022-05-09

## 2022-05-08 RX ORDER — POTASSIUM CHLORIDE 20 MEQ/1
40 TABLET, EXTENDED RELEASE ORAL ONCE
Status: COMPLETED | OUTPATIENT
Start: 2022-05-08 | End: 2022-05-08

## 2022-05-08 RX ORDER — DIPHENHYDRAMINE HCL 25 MG
25 TABLET ORAL
Status: DISCONTINUED | OUTPATIENT
Start: 2022-05-08 | End: 2022-05-10 | Stop reason: HOSPADM

## 2022-05-08 RX ORDER — LANOLIN ALCOHOL/MO/W.PET/CERES
3 CREAM (GRAM) TOPICAL
Status: DISCONTINUED | OUTPATIENT
Start: 2022-05-08 | End: 2022-05-10 | Stop reason: HOSPADM

## 2022-05-08 RX ORDER — GUAIFENESIN 600 MG
600 TABLET, EXTENDED RELEASE 12 HR ORAL EVERY 12 HOURS SCHEDULED
Status: DISCONTINUED | OUTPATIENT
Start: 2022-05-08 | End: 2022-05-10 | Stop reason: HOSPADM

## 2022-05-08 RX ADMIN — CYCLOBENZAPRINE HYDROCHLORIDE 5 MG: 5 TABLET, FILM COATED ORAL at 21:25

## 2022-05-08 RX ADMIN — MELOXICAM 7.5 MG: 15 TABLET ORAL at 09:02

## 2022-05-08 RX ADMIN — ACETAMINOPHEN 975 MG: 325 TABLET ORAL at 21:24

## 2022-05-08 RX ADMIN — ENOXAPARIN SODIUM 40 MG: 100 INJECTION SUBCUTANEOUS at 09:02

## 2022-05-08 RX ADMIN — MECLIZINE 25 MG: 12.5 TABLET ORAL at 09:02

## 2022-05-08 RX ADMIN — POTASSIUM CHLORIDE 40 MEQ: 1500 TABLET, EXTENDED RELEASE ORAL at 09:06

## 2022-05-08 RX ADMIN — ATORVASTATIN CALCIUM 40 MG: 40 TABLET, FILM COATED ORAL at 15:54

## 2022-05-08 RX ADMIN — ASPIRIN 81 MG: 81 TABLET, COATED ORAL at 09:02

## 2022-05-08 RX ADMIN — GUAIFENESIN 600 MG: 600 TABLET, EXTENDED RELEASE ORAL at 09:02

## 2022-05-08 RX ADMIN — PANTOPRAZOLE SODIUM 40 MG: 40 TABLET, DELAYED RELEASE ORAL at 05:10

## 2022-05-08 RX ADMIN — INSULIN LISPRO 1 UNITS: 100 INJECTION, SOLUTION INTRAVENOUS; SUBCUTANEOUS at 21:28

## 2022-05-08 RX ADMIN — MELATONIN TAB 3 MG 3 MG: 3 TAB at 21:25

## 2022-05-08 RX ADMIN — GUAIFENESIN 600 MG: 600 TABLET, EXTENDED RELEASE ORAL at 21:24

## 2022-05-08 RX ADMIN — DIPHENHYDRAMINE HCL 25 MG: 25 TABLET ORAL at 21:25

## 2022-05-08 RX ADMIN — ACETAMINOPHEN 975 MG: 325 TABLET ORAL at 05:10

## 2022-05-08 RX ADMIN — BENZONATATE 100 MG: 100 CAPSULE ORAL at 21:24

## 2022-05-08 RX ADMIN — BENZONATATE 100 MG: 100 CAPSULE ORAL at 05:10

## 2022-05-08 NOTE — ASSESSMENT & PLAN NOTE
· Continue home Flexeril, Mobic  · Pain regimen as ordered  · Supportive care  · PT/OT evaluation and treatment in the setting of recent fall

## 2022-05-08 NOTE — ASSESSMENT & PLAN NOTE
· Chest x-ray with bilateral patchy infiltrates possibly secondary to viral pneumonia  · Patient has been suffering from cough  · Will check COVID-19/Influenza/RSV panel  · Mucinex and Tessalon Perles  · Supportive care

## 2022-05-08 NOTE — ASSESSMENT & PLAN NOTE
· HS troponin I 139-->132-->111  · JOELLE score of 5  · EKG with no signs of active ischemia  · Possibly NSTEMI as patient does have a history of coronary artery disease on recent cardiac catheterization  · Cardiology recommends admission for ACS rule out  · No need to further trend troponin I  · Continue home aspirin and statin therapy  · Obtain 2D echocardiogram  · Monitor for anginal symptoms  · Cardiology consult ; appreciate recommendations

## 2022-05-08 NOTE — ASSESSMENT & PLAN NOTE
Lab Results   Component Value Date    HGBA1C 6 5 (H) 03/30/2022       Recent Labs     05/07/22  1839 05/07/22  2125 05/08/22  0508   POCGLU 114 143* 128       Blood Sugar Average: Last 72 hrs:  (P) 844 [id number]   · Hold home oral hypoglycemic agents  · SSI & Accu-Cheks

## 2022-05-08 NOTE — ASSESSMENT & PLAN NOTE
· Blood pressures have been soft  · Hold home amlodipine in the setting of hypotension and lower extremity swelling  · Monitor blood pressures

## 2022-05-08 NOTE — ASSESSMENT & PLAN NOTE
· Status post bilateral stents to the iliac arteries  · Significant vasculopath  · Outpatient follow-up with Vascular Surgery  · Continue home aspirin and statin therapy

## 2022-05-08 NOTE — UTILIZATION REVIEW
Initial Clinical Review    WAS OBSERVATION 05/07/2022 @ 1752, CONVERTED TO INPATIENT ADMISSION 05/08/2022 @ 1016, DUE TO CONTINUED STAY REQUIRED TO CARE FOR PATIENT WITH    NSTEMI  Acute on Chronic Combined Systolic and Diastolic Heart Failure  Continue Work up and diuresis  Admission: Date/Time/Statement:   Admission Orders (From admission, onward)     Ordered        05/08/22 1016  Inpatient Admission  Once            05/07/22 1752  Place in Observation  Once                      Orders Placed This Encounter   Procedures    Inpatient Admission     Standing Status:   Standing     Number of Occurrences:   1     Order Specific Question:   Level of Care     Answer:   Med Surg [16]     Order Specific Question:   Estimated length of stay     Answer:   More than 2 Midnights     Order Specific Question:   Certification     Answer:   I certify that inpatient services are medically necessary for this patient for a duration of greater than two midnights  See H&P and MD Progress Notes for additional information about the patient's course of treatment  ED Arrival Information     Expected Arrival Acuity    - 5/7/2022 09:39 Emergent         Means of arrival Escorted by Service Admission type    Walk-In Self Hospitalist Elective         Arrival complaint    neck pain radiating to front of head        Chief Complaint   Patient presents with    Neck Pain     pt arrives with daughter stating "I slipped and fell on a rug during the night and today I woke up with a pain on my neck "  pt appears short of breath, daughter states " this is a new level of shortness of breath, she was just at [facility] because she was sick        Initial Presentation: 66 y o  female , presented to the ED @ [facility] from home via walk in  Admitted as Obsevation due to NSTEMI  Date: 05/07/2022  Reports neck pain and ASCENCIO, since fall last night, tripped on rug     HS trop I  139 > 132 > 111  JOELLE 5    ECG:  No signs of active ischemia  Obtain 2D ECHO  Monitor & trend for anginal symptoms  Consult Cardio  Chest X:  Pulmonary vascular congestion  IV lasix daily  Standing daily weight   I&O  VTE Prophylaxis:  Lovenox    Day 1:  05/08/2022     Telemetry  Diuresis - IV lasix  PT/OT  Obtain 2D ECHO  05/08/2022  Consult Cardio: At this time we recommend continued management of bronchitis along with chronic heart failure  furosemide can be transition to 20 mg oral once daily  continue to hold amlodipine  When systolic blood pressures are consistently greater than 95 mmHg then metoprolol XL 12 5 mg once daily can be added to her regimen  given patient's daughter's concern for worsening heart failure, it would be reasonable to do a limited echocardiogram tomorrow to reassess LV function  if patient continues to have intermittent respiratory distress and coughing spells and shortness of breath recommend CT chest without contrast to evaluate for parenchymal involvement  discussion regarding feasibility of intervention for her CAD will be made by her cardiologist Dr Maye Montilla following for evaluation of her peripheral artery disease which is expected for May 12, 2022 if patient is discharged from hospital before that    patient will continue anticoagulation with Eliquis for recently diagnosed DVT      ED Triage Vitals   Temperature Pulse Respirations Blood Pressure SpO2   05/07/22 1005 05/07/22 1005 05/07/22 1005 05/07/22 1005 05/07/22 1005   98 2 °F (36 8 °C) 87 (!) 28 120/77 97 %      Temp Source Heart Rate Source Patient Position - Orthostatic VS BP Location FiO2 (%)   05/07/22 1005 05/07/22 1005 05/07/22 1005 05/07/22 1005 --   Tympanic Monitor Sitting Left arm       Pain Score       05/07/22 1831       No Pain          Wt Readings from Last 1 Encounters:   05/08/22 88 4 kg (194 lb 14 2 oz)     Additional Vital Signs:   Date/Time Temp Pulse Resp BP MAP (mmHg) SpO2 Calculated FIO2 (%) - Nasal Cannula Nasal Cannula O2 Flow Rate (L/min) O2 Device Patient Position - Orthostatic VS   22 0723 -- -- -- -- -- 94 % 24 1 L/min Nasal cannula --   22 0721 97 6 °F (36 4 °C) 80 20 96/64 -- 89 % Abnormal  -- -- None (Room air) Lying   22 2300 96 6 °F (35 9 °C) Abnormal  75 18 105/81 -- 94 % -- -- None (Room air) Lying   22 1958 -- -- 20 -- -- -- -- -- -- --   22 1818 98 3 °F (36 8 °C) 93 -- 110/94 -- 93 % -- -- -- Lying   22 1700 -- -- -- 119/74 89 -- -- -- -- --   22 1630 -- 72 20 137/64 88 94 % -- -- None (Room air) Sitting   22 1615 -- 76 21 -- -- 94 % -- -- -- --   22 1545 -- 93 54 Abnormal  -- -- 91 % -- -- -- --   22 1500 -- 72 20 108/59 75 94 % -- -- -- --   22 1445 -- 79 23 Abnormal  120/74 -- 95 % -- -- -- Lying   22 1413 -- -- -- 86/64 Abnormal  -- -- -- -- -- --   22 1230 -- 80 22 92/70 140 97 % -- -- None (Room air) --   22 1206 -- -- -- 82/62 Abnormal  -- 96 % -- -- None (Room air) Lying   22 1145 -- 62 18 86/58 Abnormal  -- 95 % -- -- None (Room air) --   22 1132 -- -- -- 80/48 Abnormal  -- -- -- -- -- Lying   22 1130 -- 74 20 94/52 66 95 % -- -- -- --   22 1030 -- 76 21 158/69 98 94 % -- -- -- --     Date and Time Eye Opening Best Verbal Response Best Motor Response Mykel Coma Scale Score   22 0902 4 5 6 15   22 4 5 6 15   22 1831 4 5 6 15   22 1018 4 5 6 15     2022 @ 1003  EC, NSR    Pertinent Labs/Diagnostic Test Results:   XR chest 2 views   Final Result by Annmarie Burris MD ([id number])   Bilateral lower lobe predominant hazy pulmonary infiltrates are seen which could reflect a diffuse infectious/inflammatory process of the lung parenchyma including viral pneumonia, recommend clinical correlation  CT head without contrast   Final Result by Glenroy Burden DO ( 1100)   Mild cerebral atrophy with chronic small vessel ischemic white matter disease     No acute intracranial abnormality  CT spine cervical without contrast   Final Result by Valencia Cummins DO (05/07 1104)   Degenerative changes of the cervical spine  No acute cervical spine fracture or traumatic malalignment          Results from last 7 days   Lab Units 05/08/22  0802   SARS-COV-2  Negative     Results from last 7 days   Lab Units 05/08/22  0510 05/07/22  1014   WBC Thousand/uL 6 61 7 38   HEMOGLOBIN g/dL 10 7* 11 8   HEMATOCRIT % 35 0 37 4   PLATELETS Thousands/uL 148* 154   NEUTROS ABS Thousands/µL 3 78 4 97     Results from last 7 days   Lab Units 05/08/22  0510 05/07/22  1014   SODIUM mmol/L 140 141   POTASSIUM mmol/L 3 5* 3 4*   CHLORIDE mmol/L 109* 107   CO2 mmol/L 24 26   ANION GAP mmol/L 7 8   BUN mg/dL 4* 3*   CREATININE mg/dL 0 73 0 77   EGFR ml/min/1 73sq m 82 77   CALCIUM mg/dL 8 5 8 5   MAGNESIUM mg/dL 1 7 1 7   PHOSPHORUS mg/dL 3 3 3 2     Results from last 7 days   Lab Units 05/07/22  1014   AST U/L 29   ALT U/L 19   ALK PHOS U/L 88   TOTAL PROTEIN g/dL 7 5   ALBUMIN g/dL 3 8   TOTAL BILIRUBIN mg/dL 1 46*     Results from last 7 days   Lab Units 05/08/22  0508 05/07/22  2125 05/07/22  1839   POC GLUCOSE mg/dl 128 143* 114     Results from last 7 days   Lab Units 05/08/22  0510 05/07/22  1014   GLUCOSE RANDOM mg/dL 128* 140*     Results from last 7 days   Lab Units 05/07/22  1628 05/07/22  1254 05/07/22  1050   HS TNI 0HR ng/L  --   --  139*   HS TNI 2HR ng/L  --  132*  --    HSTNI D2 ng/L  --  -7  --    HS TNI 4HR ng/L 111*  --   --    HSTNI D4 ng/L -28  --   --      Results from last 7 days   Lab Units 05/07/22  1014   D-DIMER QUANTITATIVE ug/ml FEU 2 19*     Results from last 7 days   Lab Units 05/07/22  1014   NT-PRO BNP pg/mL 5,390*     Results from last 7 days   Lab Units 05/07/22  1115   CLARITY UA  Clear   COLOR UA  Nanda*   SPEC GRAV UA  1 015   PH UA  6 0   GLUCOSE UA mg/dl Negative   KETONES UA mg/dl Negative   BLOOD UA  25 0*   PROTEIN UA mg/dl 15 (Trace)*   NITRITE UA Negative   BILIRUBIN UA  Negative   UROBILINOGEN UA mg/dL Negative   LEUKOCYTES UA  25 0*   WBC UA /hpf 0-1   RBC UA /hpf 1-2   BACTERIA UA /hpf Occasional   EPITHELIAL CELLS WET PREP /hpf Occasional     Results from last 7 days   Lab Units 05/08/22  0802   INFLUENZA A PCR  Negative   INFLUENZA B PCR  Negative   RSV PCR  Negative     ED Treatment:   Medication Administration from 05/07/2022 1473 to 05/07/2022 1815       Date/Time Order Dose Route Action Action by Comments   NONE     Past Medical History:   Diagnosis Date    Acid reflux     Carotid artery disease ([place] [address] )     Diabetes mellitus (HCC)     NIDDM    Edema     bles    Full dentures     History of hepatitis C     History of shingles     healed    Hx of renal calculi     Hyperlipidemia     Hypertension     Kidney stone     Liver disease     Nocturnal leg cramps     OA (osteoarthritis) of knee     left    PVD (peripheral vascular disease) ([place] [address] )     S/P insertion of iliac artery stent     mihai    Urinary incontinence     Wears glasses      Present on Admission:   NSTEMI (non-ST elevated myocardial infarction) ([place] [address] )   Diabetes mellitus ([place] [address] )   Hypertension   Acute on chronic combined systolic and diastolic heart failure (HCC)   Viral pneumonia   Primary osteoarthritis of left knee   Aortoiliac stenosis ([facility])      Admitting Diagnosis: Neck pain [M54 2]  Dyspnea on exertion [R06 00]  Hypotension [I95 9]  Elevated troponin [R77 8]  NSTEMI (non-ST elevated myocardial infarction) ([facility]) [I21 4]  Elevated brain natriuretic peptide (BNP) level [R79 89]  Age/Sex: 66 y o  female  Admission Orders:  CV diet  Up & OOB as tolerated  Daily weight  / I&O  ECHO: pending  Consult PT/OT  Mihai SCDs      Scheduled Medications:  aspirin, 81 mg, Oral, Daily  atorvastatin, 40 mg, Oral, Daily With Dinner  cyclobenzaprine, 5 mg, Oral, HS  enoxaparin, 40 mg, Subcutaneous, Daily  furosemide, 20 mg, Oral, Daily  guaiFENesin, 600 mg, Oral, Q12H THERESA  insulin lispro, 1-6 Units, Subcutaneous, TID AC  insulin lispro, 1-6 Units, Subcutaneous, HS  meclizine, 25 mg, Oral, Daily  melatonin, 3 mg, Oral, HS  meloxicam, 7 5 mg, Oral, Daily  pantoprazole, 40 mg, Oral, Early Morning      Continuous IV Infusions:     PRN Meds:  acetaminophen, 975 mg, Oral, Q6H PRN  benzonatate, 100 mg, Oral, TID PRN  ondansetron, 4 mg, Intravenous, Q6H PRN  oxyCODONE, 5 mg, Oral, Q4H PRN  traMADol, 50 mg, Oral, Q6H PRN        IP CONSULT TO CASE MANAGEMENT  IP CONSULT TO CARDIOLOGY    Network Utilization Review Department  ATTENTION: Please call with any questions or concerns to [phone] and carefully listen to the prompts so that you are directed to the right person  All voicemails are confidential   Tucker Kaufman all requests for admission clinical reviews, approved or denied determinations and any other requests to dedicated fax number below belonging to the campus where the patient is receiving treatment   List of dedicated fax numbers for the Facilities:  [address] DENIALS (Administrative/Medical Necessity) [phone]   [address] (Maternity/NICU/Pediatrics) [phone]   [address]  [address] [address] [id number] [place] [address] [address] [facility] [place] [address] [phone]

## 2022-05-08 NOTE — ASSESSMENT & PLAN NOTE
Wt Readings from Last 3 Encounters:   05/08/22 88 4 kg (194 lb 14 2 oz)   03/29/21 88 9 kg (196 lb)   07/13/20 80 3 kg (177 lb)   · Perhaps in slight exacerbation in the setting of elevated BNP and lower extremity swelling  · Chest x-ray with no signs of pulmonary vascular congestion  · Most recent ejection fraction 50% with mild diastolic dysfunction  · Endorses mild shortness of breath and dyspnea on exertion  · 2D echocardiogram as above  · Lasix 40 mg IV daily with hold parameters  · Strict intake and output  · Daily standing weights

## 2022-05-08 NOTE — PLAN OF CARE
Problem: Potential for Falls  Goal: Patient will remain free of falls  Description: INTERVENTIONS:  - Educate patient/family on patient safety including physical limitations  - Instruct patient to call for assistance with activity   - Consult OT/PT to assist with strengthening/mobility   - Keep Call bell within reach  - Keep bed low and locked with side rails adjusted as appropriate  - Keep care items and personal belongings within reach  - Initiate and maintain comfort rounds  - Make Fall Risk Sign visible to staff  - Apply yellow socks and bracelet for high fall risk patients  - Consider moving patient to room near nurses station  Outcome: Progressing     Problem: PAIN - ADULT  Goal: Verbalizes/displays adequate comfort level or baseline comfort level  Description: Interventions:  - Encourage patient to monitor pain and request assistance  - Assess pain using appropriate pain scale  - Administer analgesics based on type and severity of pain and evaluate response  - Implement non-pharmacological measures as appropriate and evaluate response  - Consider cultural and social influences on pain and pain management  - Notify physician/advanced practitioner if interventions unsuccessful or patient reports new pain  Outcome: Progressing     Problem: INFECTION - ADULT  Goal: Absence or prevention of progression during hospitalization  Description: INTERVENTIONS:  - Assess and monitor for signs and symptoms of infection  - Monitor lab/diagnostic results  - Monitor all insertion sites, i e  indwelling lines, tubes, and drains  - Monitor endotracheal if appropriate and nasal secretions for changes in amount and color  - [place] appropriate cooling/warming therapies per order  - Administer medications as ordered  - Instruct and encourage patient and family to use good hand hygiene technique  - Identify and instruct in appropriate isolation precautions for identified infection/condition  Outcome: Progressing  Goal: Absence of fever/infection during neutropenic period  Description: INTERVENTIONS:  - Monitor WBC    Outcome: Progressing     Problem: SAFETY ADULT  Goal: Patient will remain free of falls  Description: INTERVENTIONS:  - Educate patient/family on patient safety including physical limitations  - Instruct patient to call for assistance with activity   - Consult OT/PT to assist with strengthening/mobility   - Keep Call bell within reach  - Keep bed low and locked with side rails adjusted as appropriate  - Keep care items and personal belongings within reach  - Initiate and maintain comfort rounds  - Make Fall Risk Sign visible to staff  - Apply yellow socks and bracelet for high fall risk patients  - Consider moving patient to room near nurses station  Outcome: Progressing  Goal: Maintain or return to baseline ADL function  Description: INTERVENTIONS:  -  Assess patient's ability to carry out ADLs; assess patient's baseline for ADL function and identify physical deficits which impact ability to perform ADLs (bathing, care of mouth/teeth, toileting, grooming, dressing, etc )  - Assess/evaluate cause of self-care deficits   - Assess range of motion  - Assess patient's mobility; develop plan if impaired  - Assess patient's need for assistive devices and provide as appropriate  - Encourage maximum independence but intervene and supervise when necessary  - Involve family in performance of ADLs  - Assess for home care needs following discharge   - Consider OT consult to assist with ADL evaluation and planning for discharge  - Provide patient education as appropriate  Outcome: Progressing  Goal: Maintains/Returns to pre admission functional level  Description: INTERVENTIONS:  - Perform BMAT or MOVE assessment daily    - Set and communicate daily mobility goal to care team and patient/family/caregiver     - Collaborate with rehabilitation services on mobility goals if consulted  - Out of bed for toileting  - Record patient progress and toleration of activity level   Outcome: Progressing     Problem: DISCHARGE PLANNING  Goal: Discharge to home or other facility with appropriate resources  Description: INTERVENTIONS:  - Identify barriers to discharge w/patient and caregiver  - Arrange for needed discharge resources and transportation as appropriate  - Identify discharge learning needs (meds, wound care, etc )  - Arrange for interpretive services to assist at discharge as needed  - Refer to Case Management Department for coordinating discharge planning if the patient needs post-hospital services based on physician/advanced practitioner order or complex needs related to functional status, cognitive ability, or social support system  Outcome: Progressing     Problem: Knowledge Deficit  Goal: Patient/family/caregiver demonstrates understanding of disease process, treatment plan, medications, and discharge instructions  Description: Complete learning assessment and assess knowledge base    Interventions:  - Provide teaching at level of understanding  - Provide teaching via preferred learning methods  Outcome: Progressing     Problem: Prexisting or High Potential for Compromised Skin Integrity  Goal: Skin integrity is maintained or improved  Description: INTERVENTIONS:  - Identify patients at risk for skin breakdown  - Assess and monitor skin integrity  - Assess and monitor nutrition and hydration status  - Monitor labs   - Assess for incontinence   - Turn and reposition patient  - Assist with mobility/ambulation  - Relieve pressure over bony prominences  - Avoid friction and shearing  - Provide appropriate hygiene as needed including keeping skin clean and dry  - Evaluate need for skin moisturizer/barrier cream  - Collaborate with interdisciplinary team   - Patient/family teaching  - Consider wound care consult   Outcome: Progressing

## 2022-05-08 NOTE — PLAN OF CARE
Problem: Potential for Falls  Goal: Patient will remain free of falls  Description: INTERVENTIONS:  - Educate patient/family on patient safety including physical limitations  - Instruct patient to call for assistance with activity   - Consult OT/PT to assist with strengthening/mobility   - Keep Call bell within reach  - Keep bed low and locked with side rails adjusted as appropriate  - Keep care items and personal belongings within reach  - Initiate and maintain comfort rounds  - Make Fall Risk Sign visible to staff  - Apply yellow socks and bracelet for high fall risk patients  - Consider moving patient to room near nurses station  Outcome: Progressing     Problem: PAIN - ADULT  Goal: Verbalizes/displays adequate comfort level or baseline comfort level  Description: Interventions:  - Encourage patient to monitor pain and request assistance  - Assess pain using appropriate pain scale  - Administer analgesics based on type and severity of pain and evaluate response  - Implement non-pharmacological measures as appropriate and evaluate response  - Consider cultural and social influences on pain and pain management  - Notify physician/advanced practitioner if interventions unsuccessful or patient reports new pain  Outcome: Progressing     Problem: INFECTION - ADULT  Goal: Absence or prevention of progression during hospitalization  Description: INTERVENTIONS:  - Assess and monitor for signs and symptoms of infection  - Monitor lab/diagnostic results  - Monitor all insertion sites, i e  indwelling lines, tubes, and drains  - Monitor endotracheal if appropriate and nasal secretions for changes in amount and color  - [place] appropriate cooling/warming therapies per order  - Administer medications as ordered  - Instruct and encourage patient and family to use good hand hygiene technique  - Identify and instruct in appropriate isolation precautions for identified infection/condition  Outcome: Progressing  Goal: Absence of fever/infection during neutropenic period  Description: INTERVENTIONS:  - Monitor WBC    Outcome: Progressing     Problem: SAFETY ADULT  Goal: Patient will remain free of falls  Description: INTERVENTIONS:  - Educate patient/family on patient safety including physical limitations  - Instruct patient to call for assistance with activity   - Consult OT/PT to assist with strengthening/mobility   - Keep Call bell within reach  - Keep bed low and locked with side rails adjusted as appropriate  - Keep care items and personal belongings within reach  - Initiate and maintain comfort rounds  - Make Fall Risk Sign visible to staff  - Apply yellow socks and bracelet for high fall risk patients  - Consider moving patient to room near nurses station  Outcome: Progressing  Goal: Maintain or return to baseline ADL function  Description: INTERVENTIONS:  -  Assess patient's ability to carry out ADLs; assess patient's baseline for ADL function and identify physical deficits which impact ability to perform ADLs (bathing, care of mouth/teeth, toileting, grooming, dressing, etc )  - Assess/evaluate cause of self-care deficits   - Assess range of motion  - Assess patient's mobility; develop plan if impaired  - Assess patient's need for assistive devices and provide as appropriate  - Encourage maximum independence but intervene and supervise when necessary  - Involve family in performance of ADLs  - Assess for home care needs following discharge   - Consider OT consult to assist with ADL evaluation and planning for discharge  - Provide patient education as appropriate  Outcome: Progressing  Goal: Maintains/Returns to pre admission functional level  Description: INTERVENTIONS:  - Perform BMAT or MOVE assessment daily    - Set and communicate daily mobility goal to care team and patient/family/caregiver     - Collaborate with rehabilitation services on mobility goals if consulted  - Out of bed for toileting  - Record patient progress and toleration of activity level   Outcome: Progressing     Problem: DISCHARGE PLANNING  Goal: Discharge to home or other facility with appropriate resources  Description: INTERVENTIONS:  - Identify barriers to discharge w/patient and caregiver  - Arrange for needed discharge resources and transportation as appropriate  - Identify discharge learning needs (meds, wound care, etc )  - Arrange for interpretive services to assist at discharge as needed  - Refer to Case Management Department for coordinating discharge planning if the patient needs post-hospital services based on physician/advanced practitioner order or complex needs related to functional status, cognitive ability, or social support system  Outcome: Progressing     Problem: Knowledge Deficit  Goal: Patient/family/caregiver demonstrates understanding of disease process, treatment plan, medications, and discharge instructions  Description: Complete learning assessment and assess knowledge base    Interventions:  - Provide teaching at level of understanding  - Provide teaching via preferred learning methods  Outcome: Progressing     Problem: Prexisting or High Potential for Compromised Skin Integrity  Goal: Skin integrity is maintained or improved  Description: INTERVENTIONS:  - Identify patients at risk for skin breakdown  - Assess and monitor skin integrity  - Assess and monitor nutrition and hydration status  - Monitor labs   - Assess for incontinence   - Turn and reposition patient  - Assist with mobility/ambulation  - Relieve pressure over bony prominences  - Avoid friction and shearing  - Provide appropriate hygiene as needed including keeping skin clean and dry  - Evaluate need for skin moisturizer/barrier cream  - Collaborate with interdisciplinary team   - Patient/family teaching  - Consider wound care consult   Outcome: Progressing

## 2022-05-08 NOTE — CONSULTS
ENCOUNTER DATE: 05/08/22 8:47 AM  PATIENT NAME: Norma Mcclellan   [birth year]    [id number]  Age: 66 y o  Sex: female  [place] PROVIDER & AUTHOR: Destiny Nelson MD  INPATIENT ATTENDING PHYSICIAN: Travis Sandoval DO; PRIMARYCARE PHYSICIAN: Rubens De La O MD  DATE OF ADMISSION: 5/7/2022  9:49 AM; LENGTH OF STAY: 0 days  *-*-*-*-*-*-*-*-*-*-*-*-*-*-*-*-*-*-*-*-*-*-*-*-*-*-*-*-*-*-*-*-*-*-*-*-*-*-*-*-*-*-*-*-*-*-*-*-*-*-*-*-*-*-   REASON FOR CONSULTATION:   Chest pain and shortness of breath in a patient with coronary artery disease  *-*-*-*-*-*-*-*-*-*-*-*-*-*-*-*-*-*-*-*-*-*-*-*-*-*-*-*-*-*-*-*-*-*-*-*-*-*-*-*-*-*-*-*-*-*-*-*-*-*-*-*-*-*-  CARDIAC ASSESSMENT:     1  Shortness of breath and intermittent cough-- likely bronchitis with superimposed mild congestive heart failure  2  Severe triple-vessel coronary artery disease, without angina  3  Mild decompensated heart failure, predominantly diastolic  4  Recent influenza A viral pneumonia with residual changes  5  Severe peripheral artery disease with aortoiliac occlusive disease and asymptomatic bilateral carotid artery stenosis  6  Hypertension with hypotension  7  Non MI troponin elevation secondary to demand ischemia from underlying CAD and heart failure  8  Hypokalemia  9  Recent deep vein thrombosis         Patient Active Problem List   Diagnosis    Primary osteoarthritis of left knee    Aortoiliac stenosis (HCC)    Arthritis of left knee    Atherosclerosis of native artery of extremity with intermittent claudication [facility]    Diabetes mellitus ([facility]    Renal artery stenosis (HCC)    Stenosis of carotid artery    Type 2 diabetes mellitus without complication, without long-term current use of insulin ([facility] )    Carotid stenosis, asymptomatic, bilateral    Stenosis of left subclavian artery ([address]    Aortoiliac occlusive disease ([address]    NSTEMI (non-ST elevated myocardial infarction) ([address]    Hypertension    Acute on chronic combined systolic and diastolic heart failure (HCC)    Viral pneumonia        Patient's shortness of breath is multifactorial and is more predominantly secondary to her bronchitis and recent pneumonia a rather than acute heart failure from ischemic heart disease  Currently she appears to be comfortable   She gives no history that suggests angina in the recent few days  She does have severe triple-vessel disease and not a candidate for surgical or percutaneous intervention at this time  She is not on beta-blocker therapy possibly due to hypotension  She was on amlodipine at home  CARDIAC PLAN:     -- at this time we recommend continued management of bronchitis along with chronic heart failure  -- furosemide can be transition to 20 mg oral once daily  -- continue to hold amlodipine  When systolic blood pressures are consistently greater than 95 mmHg then metoprolol XL 12 5 mg once daily can be added to her regimen  -- given patient's daughter's concern for worsening heart failure, it would be reasonable to do a limited echocardiogram tomorrow to reassess LV function  -- if patient continues to have intermittent respiratory distress and coughing spells and shortness of breath recommend CT chest without contrast to evaluate for parenchymal involvement  -- discussion regarding feasibility of intervention for her CAD will be made by her cardiologist Dr Natacha Aranda following for evaluation of her peripheral artery disease which is expected for May 12, 2022 if patient is discharged from hospital before that  -- patient will continue anticoagulation with Eliquis for recently diagnosed DVT  Cardiology team will follow patient following limited echocardiogram tomorrow  *-*-*-*-*-*-*-*-*-*-*-*-*-*-*-*-*-*-*-*-*-*-*-*-*-*-*-*-*-*-*-*-*-*-*-*-*-*-*-*-*-*-*-*-*-*-*-*-*-*-*-*-*-*-  HISTORY OF PRESENT ILLNESS     Patient is a 69-year-old female with medical history significant for:    1   Recently diagnosed severe triple-vessel coronary artery disease (severe coronary disease with a significant ostial RCA lesion, and ostial left main coronary artery lesion confirmed with IVUS as well as a type C mid LAD stenosis- cardiac catheterization April 2022)  2  Peripheral artery disease, status post stenting back in 1980s, now with occluded ostial/proximal left subclavian artery, heavily calcified proximal right brachiocephalic artery and bilateral significant carotid disease  3  Recent influenza A pneumonia   4  History of falls  5  History of COVID-19 with hypoxemic respiratory failure requiring intubation  6  History of CVA, right cerebellum, complicated with subarachnoid hemorrh, April 2020 age  9  Recent large left lower extremity DVT  8  Mild left ventricular dysfunction  9  Diabetes mellitus  10  History of hepatitis-C  11  History of left total knee replacement  12  History of esophageal dilatation procedures  13  History of MRSA 2016  14  Renal calculi  15  Dyslipidemia  16  Primary hypertension      Patient is followed by cardiologist Dr Mica Arteaga at [address] [place] [address] and was last seen by him in office on April 25, 2022  Prior to that she had been hospitalized at [facility] between April 14, 2022 and April 22, 2022  She had presented after a fall and was found to have influenza A  She had elevated cardiac enzymes and underwent cardiac catheterization which revealed severe triple-vessel disease  Cardiac surgery team was consulted Patient was deemed not a candidate for surgical intervention  High risk percutaneous intervention was being considered however given patient's severe peripheral artery disease including severe aortoiliac disease this was put on hold  Plan was to reassess her peripheral artery disease in the outpatient setting  She was recently seen by her cardiologist on April 24th  At the visit she reported experiencing cough  she did have stable dyspnea with activity    Medical management with plans to reassess in 3 months was advised  Patient has now presented to emergency room with complain of neck pain following fall yesterday early morning as she was walking from her bed to  a glass of juice which was across the room  She had a new runner rugby placed and she has sleep on the rug as she was walking  Following this she developed pain in the back of her neck and radiating to the right side of her head  At the same time she had shortness of breath  She informed her diet daughter and was brought to the emergency room In the emergency room she was noted to be slightly tachypneic with  respiratory rate in 28  Her imaging studies with chest x-ray showed bilateral lower lobe infiltrates with diffuse inflammatory process concerning for pneumonia  Laboratory evaluation was significant for slightly elevated HS troponin in 130s  NT proBNP level was elevated at 5390  Patient was treated with IV Lasix  Some of her medicines were held because of low blood pressures  Overnight she has had no acute chest pain but she reports intermittent cough and shortness of breath with coughing  Patient denies recent angina-like chest pain  She reports climbing 3 flights of stairs to her room  She does get short of breath and has to take breaks in between  Denies any orthopnea or PND or pedal edema  Denies dizziness or presyncope/syncope  She has a remote history of smoking and quit in 1980s  Her recent cardiac investigations at [facility] include:  2D echo 4/2022:  Maddi Duran: Left ventricle is normal in size  There is mild hypertrophy  Systolic function is mildly decreased with an ejection fraction of 45-50%  Anteroapical mild dyskinesis without wall thinning suggestive of stress cardiomyopathy  There is grade I (mild) diastolic dysfunction     Left Atrium: Left atrium cavity size is normal    Right Ventricle: Right ventricle cavity is normal    Aortic Valve: The aortic valve is trileaflet   IVC/SVC: The inferior vena cava demonstrates a diameter of <=21 mm and collapses >50%; therefore, the right atrial pressure is estimated at 0-5 mmHg   Pericardium: There is no pericardial effusion   Tricuspid Valve: There is trace regurgitation  Cardiac Catheterization: 4/2022:  · Severe calcific ostial RCA stenosis (80%); Severe calcific ostial LMCA stenosis with area of 4 1-4 2cm2 by IVUS assessment; severe mid LAD stenosis (99%) involving takeoff of a small caliber second diagonal branch  · Moderately elevated LV filling pressures  · Mild pullback gradient across aortic valve  · High grade heavily calcified stenosis of proximal right innominate artery with a gradient of 40-50 mmHg across lesion  · Observed invasive ascending aortic pressure to left cuff pressure differential of ~60 mmHg    · Procedures performed: moderate sedation administration (63 minutes); LHC; coronary angiography; peripheral angiography of right upper extremity; IVUS of LMCA/LCX       *-*-*-*-*-*-*-*-*-*-*-*-*-*-*-*-*-*-*-*-*-*-*-*-*-*-*-*-*-*-*-*-*-*-*-*-*-*-*-*-*-*-*-*-*-*-*-*-*-*-*-*-*-*  PAST MEDICAL HISTORY:     Past Medical History:   Diagnosis Date    Acid reflux     Carotid artery disease (HCC)     Diabetes mellitus (HCC)     NIDDM    Edema     bles    Full dentures     History of hepatitis C     History of shingles     healed    Hx of renal calculi     Hyperlipidemia     Hypertension     Kidney stone     Liver disease     Nocturnal leg cramps     OA (osteoarthritis) of knee     left    PVD (peripheral vascular disease) ([address] )     S/P insertion of iliac artery stent     miles    Urinary incontinence     Wears glasses     PAST SURGICAL HISTORY     Past Surgical History:   Procedure Laterality Date    APPENDECTOMY      COLONOSCOPY      ESOPHAGEAL DILATION      ESOPHAGOGASTRODUODENOSCOPY      ILIAC ARTERY STENT Bilateral     KIDNEY STONE SURGERY      HI TOTAL KNEE ARTHROPLASTY Left 3/7/2017    Procedure: ARTHROPLASTY KNEE TOTAL;  Surgeon: Ailyn Cooper MD;  Location: [facility];  Service: Orthopedics          FAMILY HISTORY     Family History   Family history unknown: Yes     SOCIAL HISTORY     Social History     Tobacco Use   Smoking Status Former Smoker    Quit date: 2001    Years since quittin 4   Smokeless Tobacco Never Used      Social History     Substance and Sexual Activity   Alcohol Use Never     Social History     Substance and Sexual Activity   Drug Use No    [unfilled]     *-*-*-*-*-*-*-*-*-*-*-*-*-*-*-*-*-*-*-*-*-*-*-*-*-*-*-*-*-*-*-*-*-*-*-*-*-*-*-*-*-*-*-*-*-*-*-*-*-*-*-*-*-*  ALLERGIES     Allergies   Allergen Reactions    Codeine GI Intolerance    Compazine [Prochlorperazine] Other (See Comments)     Eyes rolled up in head    Tramadol GI Intolerance     CURRENT SCHEDULED MEDICATIONS       Current Facility-Administered Medications:     acetaminophen (TYLENOL) tablet 975 mg, 975 mg, Oral, Q6H PRN, Breann Saldivar, DO, 975 mg at 22 0510    aspirin (ECOTRIN LOW STRENGTH) EC tablet 81 mg, 81 mg, Oral, Daily, Espinoza Daniel DO    atorvastatin (LIPITOR) tablet 40 mg, 40 mg, Oral, Daily With Dinner, Breann Saldivar, DO, 40 mg at 22    benzonatate (TESSALON PERLES) capsule 100 mg, 100 mg, Oral, TID PRN, Breann Saldivar, DO, 100 mg at 22    cyclobenzaprine (FLEXERIL) tablet 5 mg, 5 mg, Oral, HS, Lyn Latif Yorty, DO, 5 mg at 22    enoxaparin (LOVENOX) subcutaneous injection 40 mg, 40 mg, Subcutaneous, Daily, Espinoza Daniel DO    furosemide (LASIX) injection 40 mg, 40 mg, Intravenous, Once, Espinoza Daniel DO    furosemide (LASIX) injection 40 mg, 40 mg, Intravenous, Daily, Breann Saldivar DO    guaiFENesin (MUCINEX) 12 hr tablet 600 mg, 600 mg, Oral, Q12H [facility], Espinoza Daniel DO    insulin lispro (HumaLOG) 100 units/mL subcutaneous injection 1-6 Units, 1-6 Units, Subcutaneous, TID AC **AND** Fingerstick Glucose (POCT), , , TID AC, [place] Bouquet, DO    insulin lispro (HumaLOG) 100 units/mL subcutaneous injection 1-6 Units, 1-6 Units, Subcutaneous, HS, Espinoza Daniel, DO    meclizine (ANTIVERT) tablet 25 mg, 25 mg, Oral, Daily, Espinoza Pittsrty, DO    melatonin tablet 3 mg, 3 mg, Oral, HS, Espinoza Daniel, DO    meloxicam (MOBIC) tablet 7 5 mg, 7 5 mg, Oral, Daily, Espinoza Pittsrty, DO    ondansetron (ZOFRAN) injection 4 mg, 4 mg, Intravenous, Q6H PRN, [place] Bouquet, DO    oxyCODONE (ROXICODONE) IR tablet 5 mg, 5 mg, Oral, Q4H PRN, [place] Bouquet, DO    pantoprazole (PROTONIX) EC tablet 40 mg, 40 mg, Oral, Early Morning, Espinoza Daniel, DO, 40 mg at 05/08/22 0510    potassium chloride (K-DUR,KLOR-CON) CR tablet 40 mEq, 40 mEq, Oral, Once, Espinoza Daniel, DO    traMADol (ULTRAM) tablet 50 mg, 50 mg, Oral, Q6H PRN, Pedro Stevenusaqibt, DO     *-*-*-*-*-*-*-*-*-*-*-*-*-*-*-*-*-*-*-*-*-*-*-*-*-*-*-*-*-*-*-*-*-*-*-*-*-*-*-*-*-*-*-*-*-*-*-*-*-*-*-*-*-*   REVIEW OF SYSTEMS     Positive for:  As noted above1 in HPI  Negative for: All remaining as reviewed below and in HPI   SYSTEM SYMPTOMS REVIEWED:  General--weight change, fever, night sweats  Respiratoryl-- Wheezing, shortness of breath, cough, URI symptoms, sputum, blood  Cardiovascular--chest pain, syncope, dyspnea on exertion, edema, decline in exercise tolerance, claudication   Gastrointestinal--persistent vomiting, diarrhea, abdominal distention, blood in stool   Muscular or skeletal--joint pain or swelling   Neurologic--headaches, syncope, abnormal movement  Hematologic--history of easy bruising and bleeding   Endocrine--thyroid enlargement, heat or cold intolerance, polyuria   Psychiatric--anxiety, depression      *-*-*-*-*-*-*-*-*-*-*-*-*-*-*-*-*-*-*-*-*-*-*-*-*-*-*-*-*-*-*-*-*-*-*-*-*-*-*-*-*-*-*-*-*-*-*-*-*-*-*-*-*-*-   VITAL SIGNS     Vitals:    05/07/22 2300 05/08/22 0533 05/08/22 0721 05/08/22 0723   BP: 105/81  96/64    BP Location: Right arm  Right arm    Pulse: 75  80    Resp: 18  20    Temp: (!) 96 6 °F (35 9 °C)  97 6 °F (36 4 °C)    TempSrc: Temporal  Temporal    SpO2: 94%  (!) 89% 94%   Weight:  88 4 kg (194 lb 14 2 oz)     Height:           TEMPERATURE HISTORY 24H: Temp (24hrs), Av 7 °F (36 5 °C), Min:96 6 °F (35 9 °C), Max:98 3 °F (36 8 °C)     BLOOD PRESSURE HISTORY: Systolic (10JJH), ZEZ:059 , Min:80 , JEFFREY:500    Diastolic (44TTY), RMF:61, Min:48, Max:94      WEIGHTS:   Wt Readings from Last 25 Encounters:   22 88 4 kg (194 lb 14 2 oz)   21 88 9 kg (196 lb)   20 80 3 kg (177 lb)   17 85 7 kg (189 lb)   17 85 7 kg (189 lb)   16 87 4 kg (192 lb 9 6 oz)   16 87 7 kg (193 lb 6 1 oz)   10/05/15 87 2 kg (192 lb 4 oz)        BMI: Body mass index is 34 52 kg/m²  I&O:     Intake/Output Summary (Last 24 hours) at 2022 0847  Last data filed at 2022  Gross per 24 hour   Intake 568 ml   Output --   Net 568 ml      *-*-*-*-*-*-*-*-*-*-*-*-*-*-*-*-*-*-*-*-*-*-*-*-*-*-*-*-*-*-*-*-*-*-*-*-*-*-*-*-*-*-*-*-*-*-*-*-*-*-*-*-*-*-   PHYSICAL EXAMINATION:     General Appearance:    Alert, cooperative, no distress, appears stated age , large built, obese in no respiratory distress except for cough during conversation  Head, Eyes, ENT:    No obvious abnormality, moist mucous mebranes  Neck:   Supple, no carotid bruit or JVD   Back:     Symmetric, no curvature  Lungs:     Respirations unlabored  decreased breath sounds at bases without any obvious crackles at this time,    Chest wall:    No tenderness or deformity   Heart:    Regular rate and rhythm, S1 and S2 normal, no murmur, rub  or gallop     Abdomen:     Soft, non-tender,    Extremities:   Extremities warm, trace bilateral lower extremity edema   Skin:   Skin color, texture, turgor normal, no rashes or lesions     *-*-*-*-*-*-*-*-*-*-*-*-*-*-*-*-*-*-*-*-*-*-*-*-*-*-*-*-*-*-*-*-*-*-*-*-*-*-*-*-*-*-*-*-*-*-*-*-*-*-*-*-*-*-   LABORATORY DATA:    I have personally reviewed the available laboratory data  CMP:  Results from last 7 days   Lab Units 05/08/22  0510 05/07/22  1014   SODIUM mmol/L 140 141   POTASSIUM mmol/L 3 5* 3 4*   CHLORIDE mmol/L 109* 107   CO2 mmol/L 24 26   BUN mg/dL 4* 3*   CREATININE mg/dL 0 73 0 77   CALCIUM mg/dL 8 5 8 5   ALK PHOS U/L  --  88   ALT U/L  --  19   AST U/L  --  29        Results from last 7 days   Lab Units 05/08/22  0510 05/07/22  1014   MAGNESIUM mg/dL 1 7 1 7     Results from last 7 days   Lab Units 05/08/22  0510 05/07/22  1014   PHOSPHORUS mg/dL 3 3 3 2    Cardiac Profile:   Results from last 7 days   Lab Units 05/07/22  1014   NT-PRO BNP pg/mL 5,390*                CBC:   Results from last 7 days   Lab Units 05/08/22  0510 05/07/22  1014   WBC Thousand/uL 6 61 7 38   HEMOGLOBIN g/dL 10 7* 11 8   HEMATOCRIT % 35 0 37 4   PLATELETS Thousands/uL 148* 154     PT/INR: No results found for: PT, INR, Microbiology:          *-*-*-*-*-*-*-*-*-*-*-*-*-*-*-*-*-*-*-*-*-*-*-*-*-*-*-*-*-*-*-*-*-*-*-*-*-*-*-*-*-*-*-*-*-*-*-*-*-*-*-*-*-*-   RADIOLOGY RESULTS:     XR chest 2 views    Result Date: 5/7/2022  Impression: Bilateral lower lobe predominant hazy pulmonary infiltrates are seen which could reflect a diffuse infectious/inflammatory process of the lung parenchyma including viral pneumonia, recommend clinical correlation  Workstation performed: [id number]     CT head without contrast    Result Date: 5/7/2022  Impression: Mild cerebral atrophy with chronic small vessel ischemic white matter disease  No acute intracranial abnormality  Workstation performed: [id number]     CT spine cervical without contrast    Result Date: 5/7/2022  Impression: Degenerative changes of the cervical spine  No acute cervical spine fracture or traumatic malalignment    Workstation performed: [id number]       *-*-*-*-*-*-*-*-*-*-*-*-*-*-*-*-*-*-*-*-*-*-*-*-*-*-*-*-*-*-*-*-*-*-*-*-*-*-*-*-*-*-*-*-*-*-*-*-*-*-*-*-*-*-    LAST ECG, ECHOCARDIOGRAM AND OTHER CARDIOLOGY TEST RESULTS:     Results for orders placed or performed during the hospital encounter of 05/07/22   ECG 12 lead   Result Value    Ventricular Rate 85    Atrial Rate 85    PA Interval 146    QRSD Interval 78    QT Interval 430    QTC Interval 511    P Axis 43    QRS Axis 11    T Wave Axis 181    Narrative    Normal sinus rhythm  Low voltage QRS  Cannot rule out Anterior infarct , age undetermined  T wave abnormality, consider inferolateral ischemia  Prolonged QT  Abnormal ECG  When compared with ECG of 23-FEB-2017 11:40,  QT prolongation and inferolateral T wave inversions are new  Confirmed by Kat Loredo ([id number]) on 5/7/2022 6:59:04 PM    No results found for this or any previous visit  No results found for this or any previous visit  No results found for this or any previous visit  No results found for this or any previous visit  Telemetry shows sinus rhythm with no significant ectopy or arrhythmias  *-*-*-*-*-*-*-*-*-*-*-*-*-*-*-*-*-*-*-*-*-*-*-*-*-*-*-*-*-*-*-*-*-*-*-*-*-*-*-*-*-*-*-*-*-*-*-*-*-*-*-*-*-*-  SIGNATURES:   @JGG@   Russ Tsai MD     CC:   Carolina Christianson MD   No ref   provider found

## 2022-05-08 NOTE — PROGRESS NOTES
[facility]  Progress Note - Caroline Parisa [birth year], 66 y o  female MRN: [id number]  Unit/Bed#: 7T [facility] 702-01 Encounter: [id number]  Primary Care Provider: Larry Lackey MD   Date and time admitted to hospital: 5/7/2022  9:49 AM    * NSTEMI (non-ST elevated myocardial infarction) [facility])  Assessment & Plan  · HS troponin I 139-->132-->111  · JOELLE score of 5  · EKG with no signs of active ischemia  · Possibly NSTEMI as patient does have a history of coronary artery disease on recent cardiac catheterization  · Cardiology recommends admission for ACS rule out  · No need to further trend troponin I  · Continue home aspirin and statin therapy  · Obtain 2D echocardiogram  · Monitor for anginal symptoms  · Cardiology consult ; appreciate recommendations    Acute on chronic combined systolic and diastolic heart failure ([facility] [address] )  Assessment & Plan  Wt Readings from Last 3 Encounters:   05/08/22 88 4 kg (194 lb 14 2 oz)   03/29/21 88 9 kg (196 lb)   07/13/20 80 3 kg (177 lb)   · Perhaps in slight exacerbation in the setting of elevated BNP and lower extremity swelling  · Chest x-ray with no signs of pulmonary vascular congestion  · Most recent ejection fraction 50% with mild diastolic dysfunction  · Endorses mild shortness of breath and dyspnea on exertion  · 2D echocardiogram as above  · Lasix 40 mg IV daily with hold parameters  · Strict intake and output  · Daily standing weights        Diabetes mellitus [facility])  Assessment & Plan  Lab Results   Component Value Date    HGBA1C 6 5 (H) 03/30/2022       Recent Labs     05/07/22  1839 05/07/22  2125 05/08/22  0508   POCGLU 114 143* 128       Blood Sugar Average: Last 72 hrs:  (P) 249 [id number]   · Hold home oral hypoglycemic agents  · SSI & Accu-Cheks    Viral pneumonia  Assessment & Plan  · Chest x-ray with bilateral patchy infiltrates possibly secondary to viral pneumonia  · Patient has been suffering from cough  · Will check COVID-19/Influenza/RSV panel  · Mucinex and Tessalon Perles  · Supportive care    Hypertension  Assessment & Plan  · Blood pressures have been soft  · Hold home amlodipine in the setting of hypotension and lower extremity swelling  · Monitor blood pressures    Primary osteoarthritis of left knee  Assessment & Plan  · Continue home Flexeril, Mobic  · Pain regimen as ordered  · Supportive care  · PT/OT evaluation and treatment in the setting of recent fall    Aortoiliac stenosis (HCC)  Assessment & Plan  · Status post bilateral stents to the iliac arteries  · Significant vasculopath  · Outpatient follow-up with Vascular Surgery  · Continue home aspirin and statin therapy    VTE Pharmacologic Prophylaxis:  Lovenox    Patient Centered Rounds:  Patient care rounds were performed with nursing    Discussions with Specialists or Other Care Team Provider:  Case Management, Nursing, will speak with Cardiology    Education and Discussions with Family / Patient: Will speak with patient's daughter Yani Wayne    Time Spent for Care: 30  More than 50% of total time spent on counseling and coordination of care as described above  Current Length of Stay: 0 day(s)    Current Patient Status: Observation     Certification Statement: The patient will continue to require additional inpatient hospital stay due to ACS rule out pending 2D echocardiogram and Cardiology evaluation    Discharge Plan:  Pending PT/OT evaluation and treatment ; likely home with family support once medically stable    Code Status: Level 1 - Full Code      Subjective:   Patient seen and examined at bedside  No acute events overnight  Denies chest pain, SOB, diaphoresis, nausea/vomiting/diarrhea, fevers/chills  Endorses cough which has been chronic in nature      Objective:     Vitals:   Temp (24hrs), Av 7 °F (36 5 °C), Min:96 6 °F (35 9 °C), Max:98 3 °F (36 8 °C)    Temp:  [96 6 °F (35 9 °C)-98 3 °F (36 8 °C)] 97 6 °F (36 4 °C)  HR:  [62-93] 80  Resp:  [18-54] 20  BP: ()/(48-94) 96/64  SpO2:  [89 %-97 %] 94 %  Body mass index is 34 52 kg/m²  Input and Output Summary (last 24 hours): Intake/Output Summary (Last 24 hours) at 5/8/2022 4610  Last data filed at 5/7/2022 2001  Gross per 24 hour   Intake 568 ml   Output --   Net 568 ml       Physical Exam:     Physical Exam  Vitals and nursing note reviewed  Constitutional:       General: She is not in acute distress  Appearance: She is well-developed  HENT:      Head: Normocephalic and atraumatic  Eyes:      Conjunctiva/sclera: Conjunctivae normal    Cardiovascular:      Rate and Rhythm: Normal rate and regular rhythm  Heart sounds: No murmur heard  Pulmonary:      Effort: Pulmonary effort is normal  No respiratory distress  Breath sounds: Normal breath sounds  Abdominal:      Palpations: Abdomen is soft  Tenderness: There is no abdominal tenderness  Musculoskeletal:      Cervical back: Neck supple  Right lower leg: Edema present  Left lower leg: Edema present  Skin:     General: Skin is warm and dry  Neurological:      Mental Status: She is alert  Additional Data:     Labs:  I have reviewed pertinent results     Results from last 7 days   Lab Units 05/08/22  0510   WBC Thousand/uL 6 61   HEMOGLOBIN g/dL 10 7*   HEMATOCRIT % 35 0   PLATELETS Thousands/uL 148*   NEUTROS PCT % 56   LYMPHS PCT % 30   MONOS PCT % 10   EOS PCT % 2     Results from last 7 days   Lab Units 05/08/22  0510 05/07/22  1014 05/07/22  1014   SODIUM mmol/L 140   < > 141   POTASSIUM mmol/L 3 5*   < > 3 4*   CHLORIDE mmol/L 109*   < > 107   CO2 mmol/L 24   < > 26   BUN mg/dL 4*   < > 3*   CREATININE mg/dL 0 73   < > 0 77   ANION GAP mmol/L 7   < > 8   CALCIUM mg/dL 8 5   < > 8 5   ALBUMIN g/dL  --   --  3 8   TOTAL BILIRUBIN mg/dL  --   --  1 46*   ALK PHOS U/L  --   --  88   ALT U/L  --   --  19   AST U/L  --   --  29   GLUCOSE RANDOM mg/dL 128*   < > 140*    < > = values in this interval not displayed  Results from last 7 days   Lab Units 05/08/22  0508 05/07/22  2125 05/07/22  1839   POC GLUCOSE mg/dl 128 143* 114                 Imaging: I have reviewed pertinent imaging       Recent Cultures (last 7 days):           Last 24 Hours Medication List:   Current Facility-Administered Medications   Medication Dose Route Frequency Provider Last Rate    acetaminophen  975 mg Oral Q6H PRN Andrew Settles, DO      aspirin  81 mg Oral Daily Andrew Settles, DO      atorvastatin  40 mg Oral Daily With [employer] Pauly Green, DO      benzonatate  100 mg Oral TID PRN Andrew Settles, DO      cyclobenzaprine  5 mg Oral HS Andrew Settles, DO      enoxaparin  40 mg Subcutaneous Daily Andrew Settles, DO      furosemide  40 mg Intravenous Once Andrew Settles, DO      furosemide  40 mg Intravenous Daily Andrew Settles, DO      guaiFENesin  600 mg Oral Q12H [address] Andrew Settles, DO      insulin lispro  1-6 Units Subcutaneous TID AC Andrew Settles, DO      insulin lispro  1-6 Units Subcutaneous HS Andrew Settles, DO      meclizine  25 mg Oral Daily Andrew Settles, DO      melatonin  5 mg Oral HS Andrew Settles, DO      meloxicam  7 5 mg Oral Daily Andrew Settles, DO      ondansetron  4 mg Intravenous Q6H PRN Andrew Settles, DO      oxyCODONE  5 mg Oral Q4H PRN Andrew Settles, DO      pantoprazole  40 mg Oral Early Morning Andrew Settles, DO      potassium chloride  40 mEq Oral Once Andrew Settles, DO      traMADol  50 mg Oral Q6H PRN Andrew Settles, DO          Today, Patient Was Seen By: Andrew Cobb DO    ** Please Note: Dictation voice to text software may have been used in the creation of this document   **

## 2022-05-09 ENCOUNTER — APPOINTMENT (INPATIENT)
Dept: CT IMAGING | Facility: HOSPITAL | Age: 72
DRG: 291 | End: 2022-05-09
Payer: COMMERCIAL

## 2022-05-09 ENCOUNTER — APPOINTMENT (INPATIENT)
Dept: NON INVASIVE DIAGNOSTICS | Facility: HOSPITAL | Age: 72
DRG: 291 | End: 2022-05-09
Payer: COMMERCIAL

## 2022-05-09 PROBLEM — I50.42 CHRONIC COMBINED SYSTOLIC AND DIASTOLIC HEART FAILURE (HCC): Status: ACTIVE | Noted: 2022-05-07

## 2022-05-09 PROBLEM — R77.8 ELEVATED TROPONIN LEVEL NOT DUE TO ACUTE CORONARY SYNDROME: Status: ACTIVE | Noted: 2022-05-07

## 2022-05-09 LAB
ALBUMIN SERPL BCP-MCNC: 3.3 G/DL (ref 3–5.2)
ALP SERPL-CCNC: 91 U/L (ref 43–122)
ALT SERPL W P-5'-P-CCNC: 13 U/L
ANION GAP SERPL CALCULATED.3IONS-SCNC: 6 MMOL/L (ref 5–14)
APICAL FOUR CHAMBER EJECTION FRACTION: 43 %
AST SERPL W P-5'-P-CCNC: 19 U/L (ref 14–36)
BASOPHILS # BLD AUTO: 0.04 THOUSANDS/ΜL (ref 0–0.1)
BASOPHILS NFR BLD AUTO: 1 % (ref 0–1)
BILIRUB SERPL-MCNC: 1.18 MG/DL
BUN SERPL-MCNC: 8 MG/DL (ref 5–25)
CALCIUM ALBUM COR SERPL-MCNC: 9.4 MG/DL (ref 8.3–10.1)
CALCIUM SERPL-MCNC: 8.8 MG/DL (ref 8.4–10.2)
CHLORIDE SERPL-SCNC: 112 MMOL/L (ref 97–108)
CO2 SERPL-SCNC: 23 MMOL/L (ref 22–30)
CREAT SERPL-MCNC: 0.78 MG/DL (ref 0.6–1.2)
DOP CALC RVOT PEAK VEL: 0.48 M/S
EOSINOPHIL # BLD AUTO: 0.16 THOUSAND/ΜL (ref 0–0.61)
EOSINOPHIL NFR BLD AUTO: 2 % (ref 0–6)
ERYTHROCYTE [DISTWIDTH] IN BLOOD BY AUTOMATED COUNT: 15.6 % (ref 11.6–15.1)
GFR SERPL CREATININE-BSD FRML MDRD: 76 ML/MIN/1.73SQ M
GLUCOSE SERPL-MCNC: 115 MG/DL (ref 65–140)
GLUCOSE SERPL-MCNC: 123 MG/DL (ref 65–140)
GLUCOSE SERPL-MCNC: 123 MG/DL (ref 70–99)
GLUCOSE SERPL-MCNC: 125 MG/DL (ref 65–140)
GLUCOSE SERPL-MCNC: 127 MG/DL (ref 65–140)
HCT VFR BLD AUTO: 37.2 % (ref 34.8–46.1)
HGB BLD-MCNC: 11 G/DL (ref 11.5–15.4)
IMM GRANULOCYTES # BLD AUTO: 0.02 THOUSAND/UL (ref 0–0.2)
IMM GRANULOCYTES NFR BLD AUTO: 0 % (ref 0–2)
LYMPHOCYTES # BLD AUTO: 1.8 THOUSANDS/ΜL (ref 0.6–4.47)
LYMPHOCYTES NFR BLD AUTO: 26 % (ref 14–44)
MAGNESIUM SERPL-MCNC: 1.8 MG/DL (ref 1.6–2.3)
MCH RBC QN AUTO: 28.8 PG (ref 26.8–34.3)
MCHC RBC AUTO-ENTMCNC: 29.6 G/DL (ref 31.4–37.4)
MCV RBC AUTO: 97 FL (ref 82–98)
MONOCYTES # BLD AUTO: 0.51 THOUSAND/ΜL (ref 0.17–1.22)
MONOCYTES NFR BLD AUTO: 7 % (ref 4–12)
NEUTROPHILS # BLD AUTO: 4.32 THOUSANDS/ΜL (ref 1.85–7.62)
NEUTS SEG NFR BLD AUTO: 64 % (ref 43–75)
NRBC BLD AUTO-RTO: 0 /100 WBCS
PHOSPHATE SERPL-MCNC: 3.4 MG/DL (ref 2.5–4.8)
PLATELET # BLD AUTO: 147 THOUSANDS/UL (ref 149–390)
PMV BLD AUTO: 11 FL (ref 8.9–12.7)
POTASSIUM SERPL-SCNC: 4 MMOL/L (ref 3.6–5)
PROCALCITONIN SERPL-MCNC: 0.08 NG/ML
PROT SERPL-MCNC: 6.6 G/DL (ref 5.9–8.4)
PV PEAK GRADIENT: 2 MMHG
RA PRESSURE ESTIMATED: 3 MMHG
RBC # BLD AUTO: 3.82 MILLION/UL (ref 3.81–5.12)
RV PSP: 56 MMHG
SL CV LV EF: 45
SL CV RVOT MAX GRADIENT: 1 MMHG
SODIUM SERPL-SCNC: 141 MMOL/L (ref 137–147)
TR MAX PG: 53 MMHG
TR PEAK VELOCITY: 3.7 M/S
TRICUSPID VALVE PEAK REGURGITATION VELOCITY: 3.65 M/S
WBC # BLD AUTO: 6.85 THOUSAND/UL (ref 4.31–10.16)

## 2022-05-09 PROCEDURE — 82948 REAGENT STRIP/BLOOD GLUCOSE: CPT

## 2022-05-09 PROCEDURE — 71250 CT THORAX DX C-: CPT

## 2022-05-09 PROCEDURE — 85025 COMPLETE CBC W/AUTO DIFF WBC: CPT | Performed by: INTERNAL MEDICINE

## 2022-05-09 PROCEDURE — 93308 TTE F-UP OR LMTD: CPT

## 2022-05-09 PROCEDURE — 93325 DOPPLER ECHO COLOR FLOW MAPG: CPT

## 2022-05-09 PROCEDURE — G1004 CDSM NDSC: HCPCS

## 2022-05-09 PROCEDURE — 80053 COMPREHEN METABOLIC PANEL: CPT | Performed by: INTERNAL MEDICINE

## 2022-05-09 PROCEDURE — 93321 DOPPLER ECHO F-UP/LMTD STD: CPT

## 2022-05-09 PROCEDURE — 97110 THERAPEUTIC EXERCISES: CPT

## 2022-05-09 PROCEDURE — 97163 PT EVAL HIGH COMPLEX 45 MIN: CPT

## 2022-05-09 PROCEDURE — 83735 ASSAY OF MAGNESIUM: CPT | Performed by: INTERNAL MEDICINE

## 2022-05-09 PROCEDURE — 99233 SBSQ HOSP IP/OBS HIGH 50: CPT | Performed by: INTERNAL MEDICINE

## 2022-05-09 PROCEDURE — 97116 GAIT TRAINING THERAPY: CPT

## 2022-05-09 PROCEDURE — 97530 THERAPEUTIC ACTIVITIES: CPT

## 2022-05-09 PROCEDURE — 84145 PROCALCITONIN (PCT): CPT | Performed by: INTERNAL MEDICINE

## 2022-05-09 PROCEDURE — 84100 ASSAY OF PHOSPHORUS: CPT | Performed by: INTERNAL MEDICINE

## 2022-05-09 RX ORDER — FUROSEMIDE 10 MG/ML
40 INJECTION INTRAMUSCULAR; INTRAVENOUS DAILY
Status: DISCONTINUED | OUTPATIENT
Start: 2022-05-09 | End: 2022-05-10 | Stop reason: HOSPADM

## 2022-05-09 RX ORDER — FUROSEMIDE 10 MG/ML
40 INJECTION INTRAMUSCULAR; INTRAVENOUS DAILY
Status: DISCONTINUED | OUTPATIENT
Start: 2022-05-09 | End: 2022-05-09

## 2022-05-09 RX ADMIN — MECLIZINE 25 MG: 12.5 TABLET ORAL at 08:41

## 2022-05-09 RX ADMIN — ASPIRIN 81 MG: 81 TABLET, COATED ORAL at 08:40

## 2022-05-09 RX ADMIN — BENZONATATE 100 MG: 100 CAPSULE ORAL at 08:50

## 2022-05-09 RX ADMIN — ATORVASTATIN CALCIUM 40 MG: 40 TABLET, FILM COATED ORAL at 17:00

## 2022-05-09 RX ADMIN — PANTOPRAZOLE SODIUM 40 MG: 40 TABLET, DELAYED RELEASE ORAL at 05:44

## 2022-05-09 RX ADMIN — FUROSEMIDE 40 MG: 10 INJECTION, SOLUTION INTRAVENOUS at 22:43

## 2022-05-09 RX ADMIN — BENZONATATE 100 MG: 100 CAPSULE ORAL at 22:48

## 2022-05-09 RX ADMIN — GUAIFENESIN 600 MG: 600 TABLET, EXTENDED RELEASE ORAL at 08:41

## 2022-05-09 RX ADMIN — MELATONIN TAB 3 MG 3 MG: 3 TAB at 22:07

## 2022-05-09 RX ADMIN — ENOXAPARIN SODIUM 40 MG: 100 INJECTION SUBCUTANEOUS at 08:40

## 2022-05-09 RX ADMIN — GUAIFENESIN 600 MG: 600 TABLET, EXTENDED RELEASE ORAL at 22:07

## 2022-05-09 RX ADMIN — MELOXICAM 7.5 MG: 15 TABLET ORAL at 08:41

## 2022-05-09 RX ADMIN — CYCLOBENZAPRINE HYDROCHLORIDE 5 MG: 5 TABLET, FILM COATED ORAL at 22:07

## 2022-05-09 NOTE — PROGRESS NOTES
[facility]  Progress Note - Norma Mcclellan [birth year], 66 y o  female MRN: [id number]  Unit/Bed#: 7T [facility] 702-01 Encounter: [id number]  Primary Care Provider: Rubens De La O MD   Date and time admitted to hospital: 5/7/2022  9:49 AM    * Elevated troponin level not due to acute coronary syndrome  Assessment & Plan  · Recent viral pneumonia with cough, shortness of breath, and pleuritic chest pain  · HS troponin I 139, 132, 111  · EKG with no signs of active ischemia  · TTE (5/9): LVEF 45%  Diastolic function is abnormal   There were wall motion abnormalities that are suggestive of stress-induced Takotsubo's cardiomyopathy  The following segments are dyskinetic: apex  The following segments are akinetic: apical anterior, apical septal, apical inferior and apical lateral  The following segments are hypokinetic: mid anterior, mid inferoseptal, mid inferior and mid anterolateral  The right ventricular systolic pressure is moderately elevated  The estimated right ventricular systolic pressure is 50 62 mmHg    · Continue aspirin 81 mg daily and atorvastatin 40 mg daily  · Cardiology following    Viral pneumonia  Assessment & Plan  · Recent admission for viral pneumonia at [facility]  · Chest x-ray with bilateral patchy infiltrates (possible residual from recent infection)  · Afebrile and without leukocytosis  · Procalcitonin is negative  · Given persistent/worsening symptoms will check CT chest   · Further supportive care with Mucinex and Tessalon Perles    Chronic combined systolic and diastolic heart failure (HCC)  Assessment & Plan  Wt Readings from Last 3 Encounters:   05/09/22 88 kg (194 lb)   03/29/21 88 9 kg (196 lb)   07/13/20 80 3 kg (177 lb)   ·   · Appears euvolemic at this time  · Echocardiogram as above  · Continue Lasix 20 mg daily  · Continue daily weights and Is and Os  · Continue fluid and sodium restrictions        Aortoiliac stenosis (HCC)  Assessment & Plan  · Status post bilateral stents to the iliac arteries  · Continue outpatient follow-up with Dr Marco Cabral  · Continue ASA and Lipitor as above    Diabetes mellitus [facility])  Assessment & Plan  Lab Results   Component Value Date    HGBA1C 6 5 (H) 2022       Recent Labs     22  1550 22  2122 22  0541 22  1104   POCGLU 135 172* 125 127       Blood Sugar Average: Last 72 hrs:  (P) 136 625     · Hold home oral medications while inpatient  · Continue corrective sliding scale insulin, Accu-Cheks, and hypoglycemic protocol  · Continue carb controlled diet    Hypertension  Assessment & Plan  · Blood pressure has been relatively low during admission  · Continue to hold home amlodipine  · Will transition to oral metoprolol as recommended by Cardiology if blood pressure tolerates      VTE Pharmacologic Prophylaxis: VTE Score: 3 Moderate Risk (Score 3-4) - Pharmacological DVT Prophylaxis Ordered: enoxaparin (Lovenox)  Patient Centered Rounds: I performed bedside rounds with nursing staff today  Discussions with Specialists or Other Care Team Provider: Nursing, PT/OT, and CM    Education and Discussions with Family / Patient: Patient declined call to   Time Spent for Care: 45 minutes  More than 50% of total time spent on counseling and coordination of care as described above  Current Length of Stay: 1 day(s)  Current Patient Status: Inpatient   Certification Statement: The patient will continue to require additional inpatient hospital stay due to Shortness of breath and persistent cough  Discharge Plan: Anticipate discharge tomorrow to home  Code Status: Level 1 - Full Code    Subjective:   Patient is resting comfortably in bed without any acute complaints  She continues to report a cough and some pleuritic chest discomfort  No significant overnight events reported by nursing      Objective:     Vitals:   Temp (24hrs), Av 7 °F (36 5 °C), Min:97 5 °F (36 4 °C), Max:97 8 °F (36 6 °C)    Temp:  [97 5 °F (36 4 °C)-97 8 °F (36 6 °C)] 97 8 °F (36 6 °C)  HR:  [75-82] 82  Resp:  [18-19] 18  BP: (93-99)/(67-81) 93/81  SpO2:  [94 %-96 %] 94 %  Body mass index is 34 37 kg/m²  Input and Output Summary (last 24 hours):   No intake or output data in the 24 hours ending 05/09/22 1212    Physical Exam:   Physical Exam  Vitals and nursing note reviewed  Constitutional:       General: She is not in acute distress  Appearance: She is well-developed  She is obese  HENT:      Head: Normocephalic and atraumatic  Mouth/Throat:      Mouth: Mucous membranes are moist       Pharynx: Oropharynx is clear  Eyes:      Extraocular Movements: Extraocular movements intact  Conjunctiva/sclera: Conjunctivae normal    Cardiovascular:      Rate and Rhythm: Normal rate and regular rhythm  Pulses: Normal pulses  Heart sounds: No murmur heard  Pulmonary:      Effort: Pulmonary effort is normal  No respiratory distress  Breath sounds: Normal breath sounds  No wheezing  Abdominal:      Palpations: Abdomen is soft  Tenderness: There is no abdominal tenderness  Musculoskeletal:         General: Normal range of motion  Cervical back: Neck supple  Skin:     General: Skin is warm and dry  Neurological:      General: No focal deficit present  Mental Status: She is alert and oriented to person, place, and time  Mental status is at baseline     Psychiatric:         Mood and Affect: Mood normal          Behavior: Behavior normal          Judgment: Judgment normal         Labs:  Results from last 7 days   Lab Units 05/09/22  0539   WBC Thousand/uL 6 85   HEMOGLOBIN g/dL 11 0*   HEMATOCRIT % 37 2   PLATELETS Thousands/uL 147*   NEUTROS PCT % 64   LYMPHS PCT % 26   MONOS PCT % 7   EOS PCT % 2     Results from last 7 days   Lab Units 05/09/22  0539   SODIUM mmol/L 141   POTASSIUM mmol/L 4 0   CHLORIDE mmol/L 112*   CO2 mmol/L 23   BUN mg/dL 8   CREATININE mg/dL 0 78 ANION GAP mmol/L 6   CALCIUM mg/dL 8 8   ALBUMIN g/dL 3 3   TOTAL BILIRUBIN mg/dL 1 18   ALK PHOS U/L 91   ALT U/L 13   AST U/L 19   GLUCOSE RANDOM mg/dL 123*         Results from last 7 days   Lab Units 05/09/22  1104 05/09/22  0541 05/08/22 2122 05/08/22  1550 05/08/22  1138 05/08/22  0508 05/07/22 2125 05/07/22  1839   POC GLUCOSE mg/dl 127 125 172* 135 149* 128 143* 114         Results from last 7 days   Lab Units 05/09/22  0539   PROCALCITONIN ng/ml 0 08       Lines/Drains:  Invasive Devices  Report    None               Imaging: TTE reviewed  CT chest pending      Recent Cultures (last 7 days):         Last 24 Hours Medication List:   Current Facility-Administered Medications   Medication Dose Route Frequency Provider Last Rate    acetaminophen  975 mg Oral Q6H PRN Hettie Saltness, DO      aspirin  81 mg Oral Daily Hettie Saltness, DO      atorvastatin  40 mg Oral Daily With [employer], DO      benzonatate  100 mg Oral TID PRN Hettie Saltness, DO      cyclobenzaprine  5 mg Oral HS Hettie Saltness, DO      diphenhydrAMINE  25 mg Oral HS PRN Dante Santa PA-C      enoxaparin  40 mg Subcutaneous Daily Hettie Saltness, DO      furosemide  20 mg Oral Daily Hettie Saltness, DO      guaiFENesin  600 mg Oral Q12H [address] Hettie Saltness, DO      insulin lispro  1-6 Units Subcutaneous TID AC Hettie Saltness, DO      insulin lispro  1-6 Units Subcutaneous HS Hettie Saltness, DO      meclizine  25 mg Oral Daily Hettie Saltness, DO      melatonin  3 mg Oral HS Hettie Saltness, DO      meloxicam  7 5 mg Oral Daily Hettie Saltness, DO      ondansetron  4 mg Intravenous Q6H PRN Hettie Saltness, DO      oxyCODONE  5 mg Oral Q4H PRN Hettie Saltness, DO      pantoprazole  40 mg Oral Early Morning Hettie Saltness, DO      traMADol  50 mg Oral Q6H PRN Hettie Saltness, DO          Today, Patient Was Seen By: Kashmir Herrera DO    **Please Note: This note may have been constructed using a voice recognition system  **

## 2022-05-09 NOTE — UTILIZATION REVIEW
Continued Stay Review    Date:  5-9-22                          Current Patient Class: inpatient  Current Level of Care: med surg    HPI:67 y o  female initially admitted on 5-8-22   · HS troponin I 139, 132, 111  · EKG with no signs of active ischemia  · Recent hospitalization for viral pneumonia     Assessment/Plan:    Patient reports cough and pleuritic chest discomfort  TTE today shows EF 45% and wall motion  Abnormality suggesting stress induced "Takotsubo's" cardiomyopathy, dyskinetic apex at apical anterior, apical septal, apical interior and apical lateral   Ct chest planned to evaluate persistent bilateral patchy infiltrates  Continue daily wt, intake/ output, continue sodium restrictions  Discharge today cancelled  Vital Signs:     Date/  Time Temp Pulse Resp BP MAP SpO2 Nasal Cannula O2 Flow Rate (L/min) O2 Device   05/09/22 1016 -- 82 -- 93/81 -- -- -- --   05/09/22 0705 97 8 °F (36 6 °C) 82 18 93/81 86 94 % -- None (Room air)   05/08/22 2300 97 5 °F (36 4 °C) 80 19 93/67 -- 95 % -- None (Room air)   05/08/22 1559 97 7 °F (36 5 °C) 75 -- 99/69 -- 96 % -- None (Room air)   05/08/22 0723 -- -- -- -- -- 94 % 1 L/min Nasal cannula   05/08/22 0721 97 6 °F (36 4 °C) 80 20 96/64 -- 89 % Abnormal  -- None (Room air)               Pertinent Labs/Diagnostic Results:     · TTE (5/9): LVEF 45%  Diastolic function is abnormal   There were wall motion abnormalities that are suggestive of stress-induced Takotsubo's cardiomyopathy  The following segments are dyskinetic: apex  The following segments are akinetic: apical anterior, apical septal, apical inferior and apical lateral  The following segments are hypokinetic: mid anterior, mid inferoseptal, mid inferior and mid anterolateral  The right ventricular systolic pressure is moderately elevated   The estimated right ventricular systolic pressure is 77 22 mmHg        Results from last 7 days   Lab Units 05/08/22  0802   SARS-COV-2  Negative     Results from last 7 days   Lab Units 05/09/22  0539 05/08/22  0510 05/07/22  1014   WBC Thousand/uL 6 85 6 61 7 38   HEMOGLOBIN g/dL 11 0* 10 7* 11 8   HEMATOCRIT % 37 2 35 0 37 4   PLATELETS Thousands/uL 147* 148* 154   NEUTROS ABS Thousands/µL 4 32 3 78 4 97         Results from last 7 days   Lab Units 05/09/22  0539 05/08/22  0510 05/07/22  1014   SODIUM mmol/L 141 140 141   POTASSIUM mmol/L 4 0 3 5* 3 4*   CHLORIDE mmol/L 112* 109* 107   CO2 mmol/L 23 24 26   ANION GAP mmol/L 6 7 8   BUN mg/dL 8 4* 3*   CREATININE mg/dL 0 78 0 73 0 77   EGFR ml/min/1 73sq m 76 82 77   CALCIUM mg/dL 8 8 8 5 8 5   MAGNESIUM mg/dL 1 8 1 7 1 7   PHOSPHORUS mg/dL 3 4 3 3 3 2     Results from last 7 days   Lab Units 05/09/22  0539 05/07/22  1014   AST U/L 19 29   ALT U/L 13 19   ALK PHOS U/L 91 88   TOTAL PROTEIN g/dL 6 6 7 5   ALBUMIN g/dL 3 3 3 8   TOTAL BILIRUBIN mg/dL 1 18 1 46*     Results from last 7 days   Lab Units 05/09/22  1104 05/09/22  0541 05/08/22 2122 05/08/22  1550 05/08/22  1138 05/08/22  0508 05/07/22 2125 05/07/22  1839   POC GLUCOSE mg/dl 127 125 172* 135 149* 128 143* 114     Results from last 7 days   Lab Units 05/09/22  0539 05/08/22  0510 05/07/22  1014   GLUCOSE RANDOM mg/dL 123* 128* 140*       Results from last 7 days   Lab Units 05/07/22  1628 05/07/22  1254 05/07/22  1050   HS TNI 0HR ng/L  --   --  139*   HS TNI 2HR ng/L  --  132*  --    HSTNI D2 ng/L  --  -7  --    HS TNI 4HR ng/L 111*  --   --    HSTNI D4 ng/L -28  --   --      Results from last 7 days   Lab Units 05/07/22  1014   D-DIMER QUANTITATIVE ug/ml FEU 2 19*             Results from last 7 days   Lab Units 05/09/22  0539   PROCALCITONIN ng/ml 0 08         Results from last 7 days   Lab Units 05/07/22  1014   NT-PRO BNP pg/mL 5,390*       Results from last 7 days   Lab Units 05/07/22  1115   CLARITY UA  Clear   COLOR UA  Nanda*   SPEC GRAV UA  1 015   PH UA  6 0   GLUCOSE UA mg/dl Negative   KETONES UA mg/dl Negative   BLOOD UA  25 0*   PROTEIN UA mg/dl 15 (Trace)*   NITRITE UA  Negative   BILIRUBIN UA  Negative   UROBILINOGEN UA mg/dL Negative   LEUKOCYTES UA  25 0*   WBC UA /hpf 0-1   RBC UA /hpf 1-2   BACTERIA UA /hpf Occasional   EPITHELIAL CELLS WET PREP /hpf Occasional     Results from last 7 days   Lab Units 05/08/22  0802   INFLUENZA A PCR  Negative   INFLUENZA B PCR  Negative   RSV PCR  Negative       Medications:   Scheduled Medications:  aspirin, 81 mg, Oral, Daily  atorvastatin, 40 mg, Oral, Daily With Dinner  cyclobenzaprine, 5 mg, Oral, HS  enoxaparin, 40 mg, Subcutaneous, Daily  furosemide, 20 mg, Oral, Daily  guaiFENesin, 600 mg, Oral, Q12H THERESA  insulin lispro, 1-6 Units, Subcutaneous, TID AC  insulin lispro, 1-6 Units, Subcutaneous, HS  meclizine, 25 mg, Oral, Daily  melatonin, 3 mg, Oral, HS  meloxicam, 7 5 mg, Oral, Daily  pantoprazole, 40 mg, Oral, Early Morning      Continuous IV Infusions:     PRN Meds:  acetaminophen, 975 mg, Oral, Q6H PRN  benzonatate, 100 mg, Oral, TID PRN  diphenhydrAMINE, 25 mg, Oral, HS PRN  ondansetron, 4 mg, Intravenous, Q6H PRN  oxyCODONE, 5 mg, Oral, Q4H PRN  traMADol, 50 mg, Oral, Q6H PRN        Discharge Plan: to be determined     Network Utilization Review Department  ATTENTION: Please call with any questions or concerns to [phone] and carefully listen to the prompts so that you are directed to the right person  All voicemails are confidential   Radha Shultz all requests for admission clinical reviews, approved or denied determinations and any other requests to dedicated fax number below belonging to the campus where the patient is receiving treatment   List of dedicated fax numbers for the Facilities:  [address] DENIALS (Administrative/Medical Necessity) [phone]   [address] [place] [address] (Maternity/NICU/Pediatrics) 261 [facility],[address] [place] [address] [phone] 8049 [facility] [phone] [address] [address] [place] [address] [address] [place] [address] [phone]

## 2022-05-09 NOTE — PHYSICAL THERAPY NOTE
44' session     05/09/22 1529   PT Last Visit   PT Visit Date 05/09/22   Note Type   Note Type Treatment   Pain Assessment   Pain Assessment Tool 0-10   Pain Score No Pain   Restrictions/Precautions   Other Precautions Cognitive;O2;Fall Risk   General   Chart Reviewed Yes   Family/Caregiver Present Yes   Cognition   Attention Attends with cues to redirect   Comments pt impulsive - not always listening to cues - trying to put O2 on incorrectly - falling off her face and states that it is on right    Subjective   Subjective very talkative ( cued to decrease  28 increasing SOB   Bed Mobility   Supine to Sit 6  Modified independent   Sit to Supine 6  Modified independent   Transfers   Sit to Stand 6  Modified independent   Stand to Sit 6  Modified independent   Toilet transfer 5  Supervision   Additional items   (cues for  safety - trying to sit prior to all thenway to th)   Ambulation/Elevation   Gait Assistance 5  Supervision   Assistive Device Rolling walker;None   Distance   (80' ,140'  with RW - 10' x 2  no AD - holding onto things)   Stair Management Assistance 4  Minimal assist   Additional items   (cued for up with R and down with L )   Stair Management Technique One rail R   Number of Stairs 5   Ambulation/Elevation Additional Comments states her walker wiill not fit in her room   Balance   Static Standing Fair   Dynamic Standing Fair   Ambulatory Fair   Endurance Deficit   Endurance Deficit Yes   Endurance Deficit Description SOB - fatigue   Activity Tolerance   Activity Tolerance Patient limited by fatigue   Assessment   Prognosis Good   Problem List Decreased strength;Decreased endurance;Decreased mobility; Decreased cognition; Impaired judgement;Decreased safety awareness   Assessment Pt impulsive and occ unsafe - not listening to cues - reports that she is unable to use a RW in her room but is unsteady with no AD - reaching for furniture and walls    Attempted no O2  But in sup was 96% then upon sitting was 87% - then 93 after amb on 2L   easily SOB with activity but also talking a  Lot - cued for step to sequencing with steps  2* L knee issues and respiratory needs    Pt limited to 5 steps 2* fatigue and SOB ( unable to get a sats right after steps - not registering)    Goals   STG Expiration Date 05/16/22   PT Treatment Day 1   Plan   Treatment/Interventions   (cont as per POC)   Progress Progressing toward goals   Recommendation   PT Discharge Recommendation Home with outpatient rehabilitation   AM-PAC Basic Mobility Inpatient   Turning in Bed Without Bedrails 4   Lying on Back to Sitting on Edge of Flat Bed 4   Moving Bed to Chair 4   Standing Up From Chair 4   Walk in Room 3   Climb 3-5 Stairs 3   Basic Mobility Inpatient Raw Score 22   Basic Mobility Standardized Score 47 4   Highest Level Of Mobility   -[facility] Goal 7: Walk 25 feet or more

## 2022-05-09 NOTE — PHYSICAL THERAPY NOTE
Physical Therapy Evaluation    Patient's Name: Bari oNlan    Admitting Diagnosis  Neck pain [M54 2]  Dyspnea on exertion [R06 00]  Hypotension [I95 9]  Elevated troponin [R77 8]  NSTEMI (non-ST elevated myocardial infarction) ([facility] ) [I21 4]  Elevated brain natriuretic peptide (BNP) level [R79 89]    Problem List  Patient Active Problem List   Diagnosis    Primary osteoarthritis of left knee    Aortoiliac stenosis (HCC)    Arthritis of left knee    Atherosclerosis of native artery of extremity with intermittent claudication ([facility] )    Diabetes mellitus ([facility] )    Renal artery stenosis (HCC)    Stenosis of carotid artery    Type 2 diabetes mellitus without complication, without long-term current use of insulin ([place] [address] )    Carotid stenosis, asymptomatic, bilateral    Stenosis of left subclavian artery ([facility] )    Aortoiliac occlusive disease ([facility] )    NSTEMI (non-ST elevated myocardial infarction) ([facility] )    Hypertension    Acute on chronic combined systolic and diastolic heart failure ([facility] )    Viral pneumonia       Past Medical History  Past Medical History:   Diagnosis Date    Acid reflux     Carotid artery disease ([facility] )     Diabetes mellitus (HCC)     NIDDM    Edema     bles    Full dentures     History of hepatitis C     History of shingles     healed    Hx of renal calculi     Hyperlipidemia     Hypertension     Kidney stone     Liver disease     Nocturnal leg cramps     OA (osteoarthritis) of knee     left    PVD (peripheral vascular disease) ([facility] )     S/P insertion of iliac artery stent     miles    Urinary incontinence     Wears glasses        Past Surgical History  Past Surgical History:   Procedure Laterality Date    APPENDECTOMY      COLONOSCOPY      ESOPHAGEAL DILATION      ESOPHAGOGASTRODUODENOSCOPY      ILIAC ARTERY STENT Bilateral     KIDNEY STONE SURGERY      NH TOTAL KNEE ARTHROPLASTY Left 3/7/2017    Procedure: ARTHROPLASTY KNEE TOTAL;  Surgeon: Neel Lester MD; Location: [facility];  Service: Orthopedics       Recent Imaging  XR chest 2 views   Final Result by Maikol Jose MD (05/07 2125)      Bilateral lower lobe predominant hazy pulmonary infiltrates are seen which could reflect a diffuse infectious/inflammatory process of the lung parenchyma including viral pneumonia, recommend clinical correlation  Workstation performed: [id number]         CT head without contrast   Final Result by Edwige Mayers DO (05/07 1100)   Mild cerebral atrophy with chronic small vessel ischemic white matter disease  No acute intracranial abnormality  Workstation performed: [id number]         CT spine cervical without contrast   Final Result by Edwige Mayers DO (05/07 1104)   Degenerative changes of the cervical spine  No acute cervical spine fracture or traumatic malalignment  Workstation performed: [id number]             Recent Vital Signs  Vitals:    05/08/22 2300 05/09/22 0556 05/09/22 0705 05/09/22 1016   BP: 93/67  93/81 93/81   BP Location: Right arm  Right arm    Pulse: 80  82 82   Resp: 19  18    Temp: 97 5 °F (36 4 °C)  97 8 °F (36 6 °C)    TempSrc: Temporal  Temporal    SpO2: 95%  94%    Weight:  88 3 kg (194 lb 10 7 oz)  88 kg (194 lb)   Height:    5' 3" (1 6 m)          05/09/22 0855   PT Last Visit   PT Visit Date 05/09/22   Note Type   Note type Evaluation   Pain Assessment   Pain Assessment Tool 0-10   Pain Score No Pain   Restrictions/Precautions   Weight Bearing Precautions Per Order No   Other Precautions O2   Home Living   Type of Home House   Home Layout Multi-level; Laundry in basement;Bed/bath upstairs;Stairs to enter with rails   Bathroom Shower/Tub Walk-in shower   Bathroom Toilet Standard   Bathroom Equipment Shower chair   [address] [facility]  (And rollator)   Prior Function   Level of [place] Independent with ADLs and functional mobility   Lives With Trino Crenshaw Help From Family   ADL Assistance Independent   IADLs Independent   Falls in the last 6 months 1 to 4   General   Family/Caregiver Present No   Cognition   Overall Cognitive Status WFL   Arousal/Participation Alert   Orientation Level Oriented X4   Memory Within functional limits   Following Commands Follows one step commands with increased time or repetition   RLE Assessment   RLE Assessment X   Strength RLE   RLE Overall Strength 4-/5   LLE Assessment   LLE Assessment X   Strength LLE   LLE Overall Strength 4-/5   Coordination   Movements are Fluid and Coordinated 1   Sensation WFL   Light Touch   RLE Light Touch Grossly intact   LLE Light Touch Grossly intact   Transfers   Sit to Stand 6  Modified independent   Stand to Sit 6  Modified independent   Ambulation/Elevation   Gait pattern Decreased toe off;Decreased heel strike;Decreased hip extension; Step through pattern; Wide XAVIER; Foward flexed   Gait Assistance 5  Supervision   Additional items Assist x 1   Assistive Device Rolling walker   Distance 220 ft   Stair Management Assistance Not tested   Balance   Static Sitting Fair +   Dynamic Sitting Fair +   Static Standing Fair   Dynamic Standing Fair   Ambulatory Fair   Endurance Deficit   Endurance Deficit Yes   Endurance Deficit Description SOB, fatigue   Activity Tolerance   Activity Tolerance Patient limited by fatigue   Medical Staff Made Aware spoke to CM   Nurse Made Aware Spoke to RN   Assessment   Prognosis Good   Problem List Decreased strength;Decreased range of motion;Decreased endurance; Impaired balance;Decreased mobility;Obesity; Impaired judgement;Decreased safety awareness   Assessment see eval note   Barriers to Discharge None   Goals   Patient Goals To continue exercise classes at [facility]   STG Expiration Date 05/16/22   Short Term Goal #1 see eval note   Plan   Treatment/Interventions ADL retraining;Functional transfer training;LE strengthening/ROM; Elevations; Therapeutic exercise; Endurance training;Gait training;Spoke to MD;Spoke to nursing;Spoke to case management   PT Frequency 1-2x/wk   Recommendation   PT Discharge Recommendation Home with outpatient rehabilitation   Equipment Recommended [address] [place] Recommended Wheeled walker   AM-PAC Basic Mobility Inpatient   Turning in Bed Without Bedrails 3   Lying on Back to Sitting on Edge of Flat Bed 3   Moving Bed to Chair 3   Standing Up From Chair 3   Walk in Room 3   Climb 3-5 Stairs 3   Basic Mobility Inpatient Raw Score 18   Basic Mobility Standardized Score 41 05   Highest Level Of Mobility   JH-HLM Goal 6: Walk 10 steps or more   JH-HLM Highest Level of Mobility 7: Walk 25 feet or more   JH-HLM Goal Achieved Yes   End of Consult   Patient Position at End of Consult Seated edge of bed; All needs within reach       ASSESSMENT                                                                                                                     Suresh Amezquita is a 66 y o  female admitted to [facility] on 5/7/2022 for NSTEMI (non-ST elevated myocardial infarction) ([facility] )  Pt  has a past medical history of Acid reflux, Carotid artery disease ([facility] ), Diabetes mellitus ([facility] ), Edema, Full dentures, History of hepatitis C, History of shingles, renal calculi, Hyperlipidemia, Hypertension, Kidney stone, Liver disease, Nocturnal leg cramps, OA (osteoarthritis) of knee, PVD (peripheral vascular disease) ([facility] ), S/P insertion of iliac artery stent, Urinary incontinence, and Wears glasses    PT was consulted and pt was seen on 5/9/2022 for mobility assessment and d/c planning  Pt presents seated EOB w/ 2 L O2 and agreeable to physical therapy  Impairments limiting pt at this time include weakness, decreased ROM, impaired balance, decreased endurance, new onset of impairment of functional mobility, decreased activity tolerance, decreased safety awareness and impaired judgement   Pt able to ambulate 220 ft w/ RW however activity tolerance is limited due to SOB and general fatigue  Pt reports she goes to [facility] to exercise however would be interested in outpatient physical therapy to reduce number of falls at home  Pt is currently functioning at a modified independent assistance level for bed mobility, modified independent assistance level for transfers, and supervision assistance x1 level for ambulation with Rolling Walker  The patient's AM-PAC Basic Mobility Inpatient Short Form Raw Score is 18  A Raw score of greater than 16 suggests the patient may benefit from discharge to home  Please also refer to the recommendation of the Physical Therapist for safe discharge planning  Goals                                                                                                                                     1) Ambulation with least restrictive AD modified independent assistance without LOB and stable vitals for safe ambulation home/ community distances  2) Stair training up/down flight 12 step/s with appropriate rail/s  and modified independent assistance for safe access to previous living environment  3) Improve balance grades to good to reduce risk of falls  4)Improve LE strength grades by 1 to increase independence w/ transfers and gait  5) PT for ongoing pt and family education; DME needs and D/C planning to promote highest level of function in least restrictive environment  Recommendations                                                                                                              Pt will benefit from continued skilled IP PT to address the above mentioned impairments in order to maximize recovery and increase functional independence when completing mobility and ADLs  See flow sheet for goals and POC       DME: Kirit Castaneda    Discharge Disposition:  Home with Outpatient Physical Therapy       Carmen Schafer, SPT

## 2022-05-09 NOTE — PLAN OF CARE
Problem: PHYSICAL THERAPY ADULT  Goal: Performs mobility at highest level of function for planned discharge setting  See evaluation for individualized goals  Description: Treatment/Interventions: ADL retraining,Functional transfer training,LE strengthening/ROM,Elevations,Therapeutic exercise,Endurance training,Gait training,Spoke to MD,Spoke to nursing,Spoke to case management  Equipment Recommended: Mat Prader       See flowsheet documentation for full assessment, interventions and recommendations  Note: Prognosis: Good  Problem List: Decreased strength,Decreased range of motion,Decreased endurance,Impaired balance,Decreased mobility,Obesity,Impaired judgement,Decreased safety awareness  Assessment: see eval note  Barriers to Discharge: None        PT Discharge Recommendation: Home with outpatient rehabilitation          See flowsheet documentation for full assessment

## 2022-05-09 NOTE — ASSESSMENT & PLAN NOTE
· Recent admission for viral pneumonia at [facility]  · Chest x-ray with bilateral patchy infiltrates (possible residual from recent infection)  · Afebrile and without leukocytosis  · Procalcitonin is negative  · Given persistent/worsening symptoms will check CT chest   · Further supportive care with Mucinex and Tessalon Perles

## 2022-05-09 NOTE — ASSESSMENT & PLAN NOTE
Wt Readings from Last 3 Encounters:   05/09/22 88 kg (194 lb)   03/29/21 88 9 kg (196 lb)   07/13/20 80 3 kg (177 lb)   ·   · Appears euvolemic at this time  · Echocardiogram as above  · Continue Lasix 20 mg daily  · Continue daily weights and Is and Os  · Continue fluid and sodium restrictions

## 2022-05-09 NOTE — ASSESSMENT & PLAN NOTE
Lab Results   Component Value Date    HGBA1C 6 5 (H) 03/30/2022       Recent Labs     05/08/22  1550 05/08/22  2122 05/09/22  0541 05/09/22  1104   POCGLU 135 172* 125 127       Blood Sugar Average: Last 72 hrs:  (P) 136 625     · Hold home oral medications while inpatient  · Continue corrective sliding scale insulin, Accu-Cheks, and hypoglycemic protocol  · Continue carb controlled diet

## 2022-05-09 NOTE — PLAN OF CARE
Problem: PHYSICAL THERAPY ADULT  Goal: Performs mobility at highest level of function for planned discharge setting  See evaluation for individualized goals  Outcome: Progressing  Note: Prognosis: Good  Problem List: Decreased strength,Decreased endurance,Decreased mobility,Decreased cognition,Impaired judgement,Decreased safety awareness  Assessment: Pt impulsive and occ unsafe - not listening to cues - reports that she is unable to use a RW in her room but is unsteady with no AD - reaching for furniture and walls   Attempted no O2  But in sup was 96% then upon sitting was 87% - then 93 after amb on 2L   easily SOB with activity but also talking a  Lot - cued for step to sequencing with steps  2* L knee issues and respiratory needs   Pt limited to 5 steps 2* fatigue and SOB ( unable to get a sats right after steps - not registering)   Barriers to Discharge: None        PT Discharge Recommendation: Home with outpatient rehabilitation          See flowsheet documentation for full assessment

## 2022-05-09 NOTE — ASSESSMENT & PLAN NOTE
· Recent viral pneumonia with cough, shortness of breath, and pleuritic chest pain  · HS troponin I 139, 132, 111  · EKG with no signs of active ischemia  · TTE (5/9): LVEF 45%  Diastolic function is abnormal   There were wall motion abnormalities that are suggestive of stress-induced Takotsubo's cardiomyopathy  The following segments are dyskinetic: apex  The following segments are akinetic: apical anterior, apical septal, apical inferior and apical lateral  The following segments are hypokinetic: mid anterior, mid inferoseptal, mid inferior and mid anterolateral  The right ventricular systolic pressure is moderately elevated  The estimated right ventricular systolic pressure is 14 25 mmHg    · Continue aspirin 81 mg daily and atorvastatin 40 mg daily  · Cardiology following

## 2022-05-09 NOTE — ASSESSMENT & PLAN NOTE
· Blood pressure has been relatively low during admission  · Continue to hold home amlodipine  · Will transition to oral metoprolol as recommended by Cardiology if blood pressure tolerates

## 2022-05-09 NOTE — ASSESSMENT & PLAN NOTE
· Status post bilateral stents to the iliac arteries  · Continue outpatient follow-up with Dr Harman Siegel  · Continue ASA and Lipitor as above

## 2022-05-10 VITALS
HEART RATE: 70 BPM | WEIGHT: 190.48 LBS | DIASTOLIC BLOOD PRESSURE: 63 MMHG | TEMPERATURE: 97 F | OXYGEN SATURATION: 97 % | HEIGHT: 63 IN | SYSTOLIC BLOOD PRESSURE: 93 MMHG | BODY MASS INDEX: 33.75 KG/M2 | RESPIRATION RATE: 18 BRPM

## 2022-05-10 LAB
GLUCOSE SERPL-MCNC: 129 MG/DL (ref 65–140)
GLUCOSE SERPL-MCNC: 140 MG/DL (ref 65–140)

## 2022-05-10 PROCEDURE — 82948 REAGENT STRIP/BLOOD GLUCOSE: CPT

## 2022-05-10 PROCEDURE — 94761 N-INVAS EAR/PLS OXIMETRY MLT: CPT

## 2022-05-10 PROCEDURE — 99239 HOSP IP/OBS DSCHRG MGMT >30: CPT | Performed by: INTERNAL MEDICINE

## 2022-05-10 PROCEDURE — 99232 SBSQ HOSP IP/OBS MODERATE 35: CPT | Performed by: INTERNAL MEDICINE

## 2022-05-10 PROCEDURE — 97167 OT EVAL HIGH COMPLEX 60 MIN: CPT

## 2022-05-10 RX ORDER — FUROSEMIDE 40 MG/1
40 TABLET ORAL DAILY
Qty: 30 TABLET | Refills: 0 | Status: ON HOLD | OUTPATIENT
Start: 2022-05-10 | End: 2022-05-26 | Stop reason: SDUPTHER

## 2022-05-10 RX ADMIN — MELOXICAM 7.5 MG: 15 TABLET ORAL at 08:12

## 2022-05-10 RX ADMIN — ENOXAPARIN SODIUM 40 MG: 100 INJECTION SUBCUTANEOUS at 08:11

## 2022-05-10 RX ADMIN — PANTOPRAZOLE SODIUM 40 MG: 40 TABLET, DELAYED RELEASE ORAL at 05:30

## 2022-05-10 RX ADMIN — MECLIZINE 25 MG: 12.5 TABLET ORAL at 08:12

## 2022-05-10 RX ADMIN — ASPIRIN 81 MG: 81 TABLET, COATED ORAL at 08:12

## 2022-05-10 RX ADMIN — GUAIFENESIN 600 MG: 600 TABLET, EXTENDED RELEASE ORAL at 08:12

## 2022-05-10 RX ADMIN — FUROSEMIDE 40 MG: 10 INJECTION, SOLUTION INTRAVENOUS at 08:11

## 2022-05-10 NOTE — PROGRESS NOTES
Cardiology         Progress Note - Cardiology   Jasbir Kuo 66 y o  female MRN: [id number]  Unit/Bed#: 7T [facility] 702-01 Encounter: [id number]          Assessment/Recommendations/Discussion:   1  Shortness of breath and intermittent cough-- likely bronchitis with superimposed mild congestive heart failure  2  Severe triple-vessel coronary artery disease, without angina  3  ICM, EF 45%  4  Mild decompensated heart failure, predominantly diastolic  5  Recent influenza A viral pneumonia with residual changes  6  Severe peripheral artery disease with aortoiliac occlusive disease and asymptomatic bilateral carotid artery stenosis  7  Hypertension with hypotension  8  Non MI troponin elevation secondary to demand ischemia from underlying CAD and heart failure  9  Hypokalemia  10  Recent deep vein thrombosis          · No symptoms of angina, continue ASA, statin  · Volume status improved, no further cough, now off O2, transition to lasix 40 po daily  · BP marginally low, therefore hold off on ACEi/ARB/entresto/BB  F/U with primary cardiologist after d/c to introduce these meds  · Vascular workup in progress as OP  · Echo unchanged, EF 45% with apical WMA, due to CAD (ischemic CM)  · D/W primary service              Subjective: Pt seen/examined   Denies CP, SOB, cough, feeling much better today                Physical Exam:  GEN:  NAD  HEENT:  MMM, NCAT, pink conjunctiva, EOMI, nonicteric sclera  CV:  NO JVD/HJR, RR, NO M/R/G, +S1/S2, NO PARASTERNAL HEAVE/THRILL, NO LE EDEMA, NO HEPATIC SYSTOLIC PULSATION, WARM EXTREMITIES  RESP:  CTAB/L  ABD:  SOFT, NT, NO GROSS ORGANOMEGALY        Vitals:   BP 93/63 (BP Location: Right arm)   Pulse 70   Temp (!) 97 °F (36 1 °C) (Temporal)   Resp 18   Ht 5' 3" (1 6 m)   Wt 86 4 kg (190 lb 7 6 oz)   SpO2 97%   BMI 33 74 kg/m²   Vitals:    05/09/22 1016 05/10/22 0534   Weight: 88 kg (194 lb) 86 4 kg (190 lb 7 6 oz)       Intake/Output Summary (Last 24 hours) at 5/10/2022 [address] [facility] filed at 5/10/2022 0900  Gross per 24 hour   Intake 360 ml   Output --   Net 360 ml       TELEMETRY: off  Lab Results:  Results from last 7 days   Lab Units 05/09/22  0539   WBC Thousand/uL 6 85   HEMOGLOBIN g/dL 11 0*   HEMATOCRIT % 37 2   PLATELETS Thousands/uL 147*     Results from last 7 days   Lab Units 05/09/22  0539   POTASSIUM mmol/L 4 0   CHLORIDE mmol/L 112*   CO2 mmol/L 23   BUN mg/dL 8   CREATININE mg/dL 0 78   CALCIUM mg/dL 8 8   ALK PHOS U/L 91   ALT U/L 13   AST U/L 19     Results from last 7 days   Lab Units 05/09/22  0539   POTASSIUM mmol/L 4 0   CHLORIDE mmol/L 112*   CO2 mmol/L 23   BUN mg/dL 8   CREATININE mg/dL 0 78   CALCIUM mg/dL 8 8           Medications:    Current Facility-Administered Medications:     acetaminophen (TYLENOL) tablet 975 mg, 975 mg, Oral, Q6H PRN, Kanchan Speaker, DO, 975 mg at 05/08/22 2124    aspirin (ECOTRIN LOW STRENGTH) EC tablet 81 mg, 81 mg, Oral, Daily, Kanchan Speaker, DO, 81 mg at 05/10/22 3291    atorvastatin (LIPITOR) tablet 40 mg, 40 mg, Oral, Daily With Dinner, Kanchan Speaker, DO, 40 mg at 05/09/22 1700    benzonatate (TESSALON PERLES) capsule 100 mg, 100 mg, Oral, TID PRN, Kanchan Speaker, DO, 100 mg at 05/09/22 2248    cyclobenzaprine (FLEXERIL) tablet 5 mg, 5 mg, Oral, HS, Megan Pittsrtjulian, DO, 5 mg at 05/09/22 2207    diphenhydrAMINE (BENADRYL) tablet 25 mg, 25 mg, Oral, HS PRN, Monalisa Felty, PA-C, 25 mg at 05/08/22 2125    enoxaparin (LOVENOX) subcutaneous injection 40 mg, 40 mg, Subcutaneous, Daily, Megan Daniel, DO, 40 mg at 05/10/22 6546    furosemide (LASIX) injection 40 mg, 40 mg, Intravenous, Daily, Mariel Gilmore, DO, 40 mg at 05/10/22 6747    guaiFENesin (MUCINEX) 12 hr tablet 600 mg, 600 mg, Oral, Q12H [facility], Espinoza Daniel DO, 600 mg at 05/10/22 0812    insulin lispro (HumaLOG) 100 units/mL subcutaneous injection 1-6 Units, 1-6 Units, Subcutaneous, TID WILLIAM **AND** Fingerstick Glucose (POCT), , , TID AC, Espinoza BENITEZ María, DO    insulin lispro (HumaLOG) 100 units/mL subcutaneous injection 1-6 Units, 1-6 Units, Subcutaneous, HS, Makenna Adrian, DO, 1 Units at 05/08/22 2128    meclizine (ANTIVERT) tablet 25 mg, 25 mg, Oral, Daily, Makenna Lebanon, DO, 25 mg at 05/10/22 0399    melatonin tablet 3 mg, 3 mg, Oral, HS, Makenna Adrian, DO, 3 mg at 05/09/22 2207    meloxicam (MOBIC) tablet 7 5 mg, 7 5 mg, Oral, Daily, Makenna Adrian, DO, 7 5 mg at 05/10/22 6098    ondansetron (ZOFRAN) injection 4 mg, 4 mg, Intravenous, Q6H PRN, Makenna Adrian, DO    oxyCODONE (ROXICODONE) IR tablet 5 mg, 5 mg, Oral, Q4H PRN, Makenna Adrian, DO    pantoprazole (PROTONIX) EC tablet 40 mg, 40 mg, Oral, Early Morning, Espinoza Daniel, DO, 40 mg at 05/10/22 0530    traMADol (ULTRAM) tablet 50 mg, 50 mg, Oral, Q6H PRN, Makenna Adrian, DO    This note was completed in part utilizing M-Modal Fluency Direct Software  Grammatical errors, random word insertions, spelling mistakes, and incomplete sentences may be an occasional consequence of this system secondary to software limitations, ambient noise, and hardware issues  If you have any questions or concerns about the content, text, or information contained within the body of this dictation, please contact the provider for clarification

## 2022-05-10 NOTE — ASSESSMENT & PLAN NOTE
· Blood pressure has been relatively low during admission  · Continue to hold home amlodipine  · Cardiology recommending transition from amlodipine to metoprolol in the outpatient setting if her blood pressure tolerates

## 2022-05-10 NOTE — ASSESSMENT & PLAN NOTE
· Recent admission for viral pneumonia at [facility]  · Chest x-ray with bilateral patchy infiltrates (possible residual from recent infection)  · CT Chest (5/9/22): Mild pulmonary edema and small bibasilar pleural effusions likely on the basis of CHF  Cardiomegaly and thin posterior pericardial effusion measuring a maximum of 5 mm     · Afebrile and without leukocytosis  · Procalcitonin is negative  · Further supportive care

## 2022-05-10 NOTE — DISCHARGE INSTRUCTIONS
Type 2 Diabetes Management for Adults   WHAT YOU NEED TO KNOW:   Type 2 diabetes is a disease that affects how your body uses glucose (sugar)  Either your body cannot make enough insulin, or it cannot use the insulin correctly  It is important to keep diabetes controlled to prevent damage to your heart, blood vessels, and other organs  DISCHARGE INSTRUCTIONS:   Have someone call your local emergency number (911 in the [address] [facility]) if:   · You cannot be woken  · You have signs of diabetic ketoacidosis:     ? confusion, fatigue    ? vomiting    ? rapid heartbeat    ? fruity smelling breath    ? extreme thirst    ? dry mouth and skin    · You have any of the following signs of a heart attack:      ? Squeezing, pressure, or pain in your chest    ? You may  also have any of the following:     § Discomfort or pain in your back, neck, jaw, stomach, or arm    § Shortness of breath    § Nausea or vomiting    § Lightheadedness or a sudden cold sweat    · You have any of the following signs of a stroke:      ? Numbness or drooping on one side of your face     ? Weakness in an arm or leg    ? Confusion or difficulty speaking    ? Dizziness, a severe headache, or vision loss    Call your doctor or diabetes care team if:   · You have a sore or wound that will not heal     · You have a change in the amount you urinate  · Your blood sugar levels are higher than your target goals  · You often have lower blood sugar levels than your target goals  · Your skin is red, dry, warm, or swollen  · You have trouble coping with diabetes, or you feel anxious or depressed  · You have questions or concerns about your condition or care  What you need to know about high blood sugar levels:  High blood sugar levels may not cause any symptoms  You may feel more thirsty or urinate more often than usual  Over time, high blood sugar levels can damage your nerves, blood vessels, tissues, and organs   The following can increase your blood sugar levels:  · Large meals or large amounts of carbohydrates at one time    · Less physical activity    · Stress    · Illness    · A lower dose of medicine or insulin, or a late dose    What you need to know about low blood sugar levels: You can prevent symptoms such as shakiness, dizziness, irritability, or confusion by preventing your blood sugar levels from going too low  · Treat a low blood sugar level right away  ? Drink 4 ounces of juice or have 1 tube of glucose gel  ? Check your blood sugar level again 10 to 15 minutes later  ? When the level goes back to normal, eat a meal or snack to prevent another decrease  · Keep glucose gel, raisins, or hard candy with you at all times to treat a low blood sugar level  · Your blood sugar level can get too low if you take diabetes medicine or insulin and do not eat enough food  · If you use insulin, check your blood sugar level before you exercise  ? If your blood sugar level is below 100 mg/dL, eat 4 crackers or 2 ounces of raisins, or drink 4 ounces of juice  ? Check your level every 30 minutes if you exercise more than 1 hour  ? You may need a snack during or after exercise  What you can do to manage your blood sugar levels:   · Check your blood sugar levels as directed and as needed  Several items are available to use to check your levels  You may need to check by testing a drop of blood in a glucose monitor  You may instead be given a continuous glucose monitoring (CGM) device  The device is worn at all times  The CGM checks your blood sugar level every 5 minutes  It sends results to an electronic device such as a smart phone  A CGM can be used with or without an insulin pump  Talk with your provider to find out which method is best for you  The goal for blood sugar levels before meals  is between 80 and 130 mg/dL and 2 hours after eating  is lower than 180 mg/dL  · Make healthy food choices    Work with a dietitian to develop a meal plan that works for you and your schedule  A dietitian can help you learn how to eat the right amount of carbohydrates during your meals and snacks  Carbohydrates can raise your blood sugar level if you eat too many at one time  Examples of foods that contain carbohydrates are breads, cereals, rice, pasta, and sweets  · Get regular physical activity  Physical activity can help you get to your target blood sugar level goal and manage your weight  Get at least 150 minutes of moderate to vigorous aerobic physical activity each week  Do not miss more than 2 days in a row  Do not sit longer than 30 minutes at a time  Your healthcare provider can help you create an activity plan  The plan can include the best activities for you and can help you build your strength and endurance  · Maintain a healthy weight  Ask your healthcare provider what a healthy weight is for you  Ask him or her to help you create a safe weight loss plan if you are overweight  · Take your diabetes medicine or insulin as directed  You may need diabetes medicine, insulin, or both to help control your blood sugar levels  Your healthcare provider will teach you how and when to take your diabetes medicine or insulin  You will also be taught about side effects oral diabetes medicine can cause  Insulin may be injected, or given through a pump or pen  You and your care team will discuss which method is best for you  ? An insulin pump  is an implanted device that gives your insulin 24 hours a day  An insulin pump prevents the need for multiple insulin injections in a day  ? An insulin pen  is a device prefilled with the right amount of insulin  ? You and your family members will be taught how to draw up and give insulin  if this is the best method for you  Your education team will also teach you how to dispose of needles and syringes  ?  You will learn how much insulin you need and when to give it  You will be taught when not to give insulin  You will also be taught what to do if your blood sugar level drops too low  This may happen if you take insulin and do not eat the right amount of carbohydrates  Other things you can do to manage type 2 diabetes:   · Wear medical alert identification  Wear medical alert jewelry or carry a card that says you have diabetes  Ask your provider where to get these items  · Do not smoke  Nicotine and other chemicals in cigarettes and cigars can cause lung and blood vessel damage  It also makes it more difficult to manage your diabetes  Ask your provider for information if you currently smoke and need help to quit  Do not use e-cigarettes or smokeless tobacco in place of cigarettes or to help you quit  They still contain nicotine  · Check your feet each day for cuts, scratches, calluses, or other wounds  Look for redness and swelling, and feel for warmth  Wear shoes that fit well  Check your shoes for rocks or other objects that can hurt your feet  Do not walk barefoot or wear shoes without socks  Wear cotton socks to help keep your feet dry  · Ask about vaccines you may need  You have a higher risk for serious illness if you get the flu, pneumonia, COVID-19, or hepatitis  Ask your provider if you should get vaccines to prevent these or other diseases, and when to get the vaccines  · Talk to your care team if you become stressed about diabetes care  Sometimes being able to fit diabetes care into your life can cause increased stress  The stress can cause you not to take care of yourself properly  Your care team can help by offering tips about self-care  Your care team may suggest you talk to a mental health provider  The provider can listen and offer help with self-care issues  Follow up with your doctor or diabetes care team as directed: You may need to have blood tests done before your follow-up visit   The test results will show if changes need to be made in your treatment or self-care  Write down your questions so you remember to ask them during your visits  Talk to your provider if you cannot afford your medicine  © Copyright [employer] 2022 Information is for End User's use only and may not be sold, redistributed or otherwise used for commercial purposes  All illustrations and images included in CareNotes® are the copyrighted property of A D A M [employer]  or Cody Tijerina   The above information is an  only  It is not intended as medical advice for individual conditions or treatments  Talk to your doctor, nurse or pharmacist before following any medical regimen to see if it is safe and effective for you  Fluid Restriction   WHAT YOU NEED TO KNOW:   Fluid restriction means that you need to limit the amount of liquid you have each day  Fluid restriction is needed if your body is holding water  This is called fluid retention  Fluid retention can cause health problems, such as tissue and blood vessel damage, long-term swelling, and stress on the heart  Ask your healthcare provider how much liquid you can have each day  DISCHARGE INSTRUCTIONS:   Call your local emergency number (911 in the [address] [facility]) if:   · You have shortness of breath  · You have chest pain  Call your doctor if:   · You have a severe headache that does not get better with medicine  · You gain 2 pounds in 1 day  · Your skin is tight and shiny  · You are urinating very little, even though you are regularly drinking liquids  · You have signs of dehydration, such as a headache, dark yellow urine, dry eyes or mouth, or a fast heartbeat  · You have questions or concerns about your condition or care  How to track your liquid intake:  Keep a record of the amount of every liquid you have each day  · Liquids can be measured in milliliters (mL), liters (L), ounces (oz), or cups (c)  Choose a measurement that is familiar to you   Your healthcare provider or dietitian can show you how to change units of measurement if needed  For example, you may be tracking liquids in mL, but the container lists the amount in ounces  Your provider or dietitian can show you how to find how many mL are in the container  You may be able to use a chart or computer program to make this easier  · Remember to record the amount of liquid you use to swallow your medicines  · Soup needs to be  into liquids and solids before you eat it  Write down the amount of liquid before you eat  Amount of liquid in common foods and drinks:  Liquid from both foods and drinks should be counted toward your daily liquid limit:  · 12 ounces (1 can) of soda (332 mL)    · 1 cup of juice (215 mL) or 2% milk (217 mL)    · 6 ounces of coffee (175 mL) or 6 ounces of tea (168 mL)    · 1 cup of gelatin (200 mL)    · 1 single popsicle (45 mL)    · 1 cup of ice cream (100 mL) or sherbet (127 mL)    · 1 cup of yogurt (182 mL) or cottage cheese (185 mL)    · 1 cup of raw peaches, canned in juice (218 mL)    · 1 cup of grapes (120 mL) or berries (130 mL)    · 1 cup of watermelon (140 mL)    · 1 cup of cooked broccoli (170 mL) or creamed corn (200 mL)    Track your liquid intake:  Keep a record of the amount of liquid you get each day  Record the exact amount of liquid in mL  To do this, measure the amount of ounces that your glass holds  Multiply the amount in ounces by 30 to get the amount in mL  For example, a glass may hold 16 ounces of liquid  To get the amount in mL, multiply 16 by 30  The total amount of liquid in this glass is 480 mL  Monitor your weight:  Weigh yourself at the same time every day, with the same scale  Record your weight so you can compare it to your other daily weights  You may be retaining fluid if your weight goes up by more than 2 pounds in 24 hours  If you have a sudden weight loss, you may be dehydrated          Other things to remember about fluid balance:   · Large amounts of sodium from foods can cause fluid retention  Sodium is found in table salt, salted snacks, harry, cheddar cheese, soy sauce, lunch meat, and canned vegetables  Ask your healthcare provider how much sodium you can have each day  · Diuretics are medicines that can help your body get rid of extra fluid  If you take diuretics, follow your healthcare provider's directions  You may become dehydrated if you take too much of this medicine  Help relieve thirst:   · Sip your liquid  Small sips throughout the day may help relieve or prevent thirst     · Gum or hard candy can help your mouth feel less dry  · Rinse your mouth  You can use mouthwash or water  Do not swallow after you rinse  · Limit salt  Salt may make your thirst worse  Do not eat foods that are high in salt, such as crackers  Do not add salt to your food  · Frozen liquids may help with thirst, and can help you get liquids more slowly  You will still need to count the liquid in your daily amount  Before you have these for the first time, figure out the amount of liquid they contain  For example, you can let a few ice chips or a popsicle melt and measure the amount of liquid  Record the amount so you will know it the next time you have the ice chips or popsicle  Follow up with your healthcare provider as directed: Your healthcare provider may recommend that you see a dietitian on a regular basis  A dietitian can help you create a plan to get the right amount of liquid each day  The dietitian can also help you calculate the amount of liquid in the foods you eat  Write down your questions so you remember to ask them during your visits  © Copyright [employer] 2022 Information is for End User's use only and may not be sold, redistributed or otherwise used for commercial purposes   All illustrations and images included in CareNotes® are the copyrighted property of [facility] [employer] Inc  or Cody Ordaz  The above information is an  only  It is not intended as medical advice for individual conditions or treatments  Talk to your doctor, nurse or pharmacist before following any medical regimen to see if it is safe and effective for you

## 2022-05-10 NOTE — ASSESSMENT & PLAN NOTE
Wt Readings from Last 3 Encounters:   05/10/22 86 4 kg (190 lb 7 6 oz)   03/29/21 88 9 kg (196 lb)   07/13/20 80 3 kg (177 lb)     · Appears euvolemic at this time  · Echocardiogram as above  · Begin Lasix 40 mg daily as recommended by Cardiology at discharge  · Continue daily weights and Is and Os  · Continue fluid and sodium restrictions

## 2022-05-10 NOTE — NURSING NOTE
Pt discharged to home after instructions given to pt on home medication and follow ups as well as diet including salt intake and fluid restriction, pt states understanding, no distress on discharge, meds delivered to bedside

## 2022-05-10 NOTE — CASE MANAGEMENT
Case Management Assessment & Discharge Planning Note    Patient name Kirit Jo  Location [facility] 702-01 MRN [id number]  : 1950 Date 5/10/2022       Current Admission Date: 2022  Current Admission Diagnosis:Elevated troponin level not due to acute coronary syndrome   Patient Active Problem List    Diagnosis Date Noted    Viral pneumonia 2022    Elevated troponin level not due to acute coronary syndrome 2022    Hypertension 2022    Chronic combined systolic and diastolic heart failure ([facility] [address] ) 2022    Carotid stenosis, asymptomatic, bilateral 2020    St[facility]osis [facility] left subcl[facility] artery [facility] ) 2020    Aortoiliac occlusive disease [facility] ) 2020    Diabetes mellitus ([facility] ) 10/29/2019    Type 2 diabetes mellitus without complication, without long-term current use of insulin ([facility] ) 2019    Primary osteoarthritis of left knee 2017    Arthritis of left knee 2016    Aortoiliac stenosis [facility] ) 10/05/2015    Renal artery stenosis ([facility]) 10/05/2015    Atherosclerosis of native artery of extremity with intermittent claudication ([facility] ) 2014    Stenosis of carotid artery 2014      LOS (days): 2  Geometric Mean LOS (GMLOS) (days): 4 10  Days to GMLOS:2     OBJECTIVE:    Risk of Unplanned Readmission Score: 10         Current admission status: Inpatient       Preferred Pharmacy:   [facility] [place] [place] - [address] [address]  Phone: [phone] Fax: [phone]    Primary Care Provider: Richa Arora MD    [facility]  Secondary Insurance:     ASSESSMENT:  Alek Andrew Proxies    There are no active Health Care Proxies on file  DISCHARGE DETAILS: CM was notified at morning rounds that pt is medically cleared to be discharged today  Pt will be returning back to her previous environment   PT recommendation is Home with O/P therapy  Pt stated she will continue with O/P therapy  Pt stated she has three walkers at home because she had hip surgery  CM was notified by [facility] pharmacy that pt is meds to beds with co pay of $3 72  Pt gave CM co payments of $3 72 cents  CM delivered pt's meds to beds  Transportation:Daughter    CM department will continue to follow through pt's Discharge

## 2022-05-10 NOTE — PLAN OF CARE
Problem: Potential for Falls  Goal: Patient will remain free of falls  Description: INTERVENTIONS:  - Educate patient/family on patient safety including physical limitations  - Instruct patient to call for assistance with activity   - Consult OT/PT to assist with strengthening/mobility   - Keep Call bell within reach  - Keep bed low and locked with side rails adjusted as appropriate  - Keep care items and personal belongings within reach  - Initiate and maintain comfort rounds    - Apply yellow socks and bracelet for high fall risk patients  - Consider moving patient to room near nurses station  5/10/2022 1205 by Larry Dong RN  Outcome: Completed  5/10/2022 1028 by Larry Dong RN  Outcome: Progressing     Problem: PAIN - ADULT  Goal: Verbalizes/displays adequate comfort level or baseline comfort level  Description: Interventions:  - Encourage patient to monitor pain and request assistance  - Assess pain using appropriate pain scale  - Administer analgesics based on type and severity of pain and evaluate response  - Implement non-pharmacological measures as appropriate and evaluate response  - Consider cultural and social influences on pain and pain management  - Notify physician/advanced practitioner if interventions unsuccessful or patient reports new pain  5/10/2022 1205 by Larry Dong RN  Outcome: Completed  5/10/2022 1028 by Larry Dong RN  Outcome: Progressing     Problem: INFECTION - ADULT  Goal: Absence or prevention of progression during hospitalization  Description: INTERVENTIONS:  - Assess and monitor for signs and symptoms of infection  - Monitor lab/diagnostic results  - Monitor all insertion sites, i e  indwelling lines, tubes, and drains  - Monitor endotracheal if appropriate and nasal secretions for changes in amount and color  - [place] appropriate cooling/warming therapies per order  - Administer medications as ordered  - Instruct and encourage patient and family to use good hand hygiene technique  - Identify and instruct in appropriate isolation precautions for identified infection/condition  5/10/2022 1205 by Esau Olivia RN  Outcome: Completed  5/10/2022 1028 by Esau Olivia RN  Outcome: Progressing  Goal: Absence of fever/infection during neutropenic period  Description: INTERVENTIONS:  - Monitor WBC    5/10/2022 1205 by Esau Olivia RN  Outcome: Completed  5/10/2022 1028 by Esau Olivia RN  Outcome: Progressing     Problem: SAFETY ADULT  Goal: Patient will remain free of falls  Description: INTERVENTIONS:  - Educate patient/family on patient safety including physical limitations  - Instruct patient to call for assistance with activity   - Consult OT/PT to assist with strengthening/mobility   - Keep Call bell within reach  - Keep bed low and locked with side rails adjusted as appropriate  - Keep care items and personal belongings within reach  - Initiate and maintain comfort rounds  - Make Fall Risk Sign visible to staff    - Apply yellow socks and bracelet for high fall risk patients  - Consider moving patient to room near nurses station  5/10/2022 1205 by Esau Olivia RN  Outcome: Completed  5/10/2022 1028 by Esau Olivia RN  Outcome: Progressing  Goal: Maintain or return to baseline ADL function  Description: INTERVENTIONS:  -  Assess patient's ability to carry out ADLs; assess patient's baseline for ADL function and identify physical deficits which impact ability to perform ADLs (bathing, care of mouth/teeth, toileting, grooming, dressing, etc )  - Assess/evaluate cause of self-care deficits   - Assess range of motion  - Assess patient's mobility; develop plan if impaired  - Assess patient's need for assistive devices and provide as appropriate  - Encourage maximum independence but intervene and supervise when necessary  - Involve family in performance of ADLs  - Assess for home care needs following discharge   - Consider OT consult to assist with ADL evaluation and planning for discharge  - Provide patient education as appropriate  5/10/2022 1205 by Luz Buckner RN  Outcome: Completed  5/10/2022 1028 by Luz Buckner RN  Outcome: Progressing  Goal: Maintains/Returns to pre admission functional level  Description: INTERVENTIONS:  - Perform BMAT or MOVE assessment daily    - Set and communicate daily mobility goal to care team and patient/family/caregiver  - Collaborate with rehabilitation services on mobility goals if consulted    - Out of bed for toileting  - Record patient progress and toleration of activity level   5/10/2022 1205 by Luz Buckner RN  Outcome: Completed  5/10/2022 1028 by Luz Buckner RN  Outcome: Progressing     Problem: DISCHARGE PLANNING  Goal: Discharge to home or other facility with appropriate resources  Description: INTERVENTIONS:  - Identify barriers to discharge w/patient and caregiver  - Arrange for needed discharge resources and transportation as appropriate  - Identify discharge learning needs (meds, wound care, etc )  - Arrange for interpretive services to assist at discharge as needed  - Refer to Case Management Department for coordinating discharge planning if the patient needs post-hospital services based on physician/advanced practitioner order or complex needs related to functional status, cognitive ability, or social support system  5/10/2022 1205 by Luz Buckner RN  Outcome: Completed  5/10/2022 1028 by Luz Buckner RN  Outcome: Progressing     Problem: Knowledge Deficit  Goal: Patient/family/caregiver demonstrates understanding of disease process, treatment plan, medications, and discharge instructions  Description: Complete learning assessment and assess knowledge base    Interventions:  - Provide teaching at level of understanding  - Provide teaching via preferred learning methods  5/10/2022 1205 by Luz Buckner RN  Outcome: Completed  5/10/2022 1028 by Luz Buckner RN  Outcome: Progressing     Problem: Prexisting or High Potential for Compromised Skin Integrity  Goal: Skin integrity is maintained or improved  Description: INTERVENTIONS:  - Identify patients at risk for skin breakdown  - Assess and monitor skin integrity  - Assess and monitor nutrition and hydration status  - Monitor labs   - Assess for incontinence   - Turn and reposition patient  - Assist with mobility/ambulation  - Relieve pressure over bony prominences  - Avoid friction and shearing  - Provide appropriate hygiene as needed including keeping skin clean and dry  - Evaluate need for skin moisturizer/barrier cream  - Collaborate with interdisciplinary team   - Patient/family teaching  - Consider wound care consult   5/10/2022 1205 by Mo Cheatham RN  Outcome: Completed  5/10/2022 1028 by Mo Cheatham RN  Outcome: Progressing

## 2022-05-10 NOTE — ASSESSMENT & PLAN NOTE
· Recent viral pneumonia with cough, shortness of breath, and pleuritic chest pain  · HS troponin I 139, 132, 111  · EKG with no signs of active ischemia  · TTE (5/9): LVEF 45%  Diastolic function is abnormal   There were wall motion abnormalities that are suggestive of stress-induced Takotsubo's cardiomyopathy  The following segments are dyskinetic: apex  The following segments are akinetic: apical anterior, apical septal, apical inferior and apical lateral  The following segments are hypokinetic: mid anterior, mid inferoseptal, mid inferior and mid anterolateral  The right ventricular systolic pressure is moderately elevated  The estimated right ventricular systolic pressure is 37 99 mmHg    · Continue aspirin 81 mg daily and atorvastatin 40 mg daily  · Cardiology cleared for DC home with outpatient follow-up with her previously established Cardiologist

## 2022-05-10 NOTE — OCCUPATIONAL THERAPY NOTE
Occupational Therapy Evaluation     Patient Name: Caroline Mccauley  TDZSY'C Date: 5/10/2022  Problem List  Principal Problem:    Elevated troponin level not due to acute coronary syndrome  Active Problems:     Aortoiliac stenosis (HCC)    Diabetes mellitus ([facility] )    Hypertension    Chronic combined systolic and diastolic heart failure ([facility] )    Viral pneumonia    Past Medical History  Past Medical History:   Diagnosis Date    Acid reflux     Carotid artery disease ([facility] )     Diabetes mellitus (HCC)     NIDDM    Edema     bles    Full dentures     History of hepatitis C     History of shingles     healed    Hx of renal calculi     Hyperlipidemia     Hypertension     Kidney stone     Liver disease     Nocturnal leg cramps     OA (osteoarthritis) of knee     left    PVD (peripheral vascular disease) ([facility] )     S/P insertion of iliac artery stent     miles    Urinary incontinence     Wears glasses      Past Surgical History  Past Surgical History:   Procedure Laterality Date    APPENDECTOMY      COLONOSCOPY      ESOPHAGEAL DILATION      ESOPHAGOGASTRODUODENOSCOPY      ILIAC ARTERY STENT Bilateral     KIDNEY STONE SURGERY      MO TOTAL KNEE ARTHROPLASTY Left 3/7/2017    Procedure: ARTHROPLASTY KNEE TOTAL;  Surgeon: Galen Smith MD;  Location: [facility];  Service: Orthopedics           05/10/22 1206   OT Last Visit   OT Visit Date 05/10/22   Note Type   Note type Evaluation   Restrictions/Precautions   Weight Bearing Precautions Per Order No   Other Precautions O2;Fall Risk   Pain Assessment   Pain Assessment Tool 0-10   Pain Score No Pain   Home Living   Type of [address] [place] [address] Multi-level;Bed/bath upstairs;Stairs to enter with rails;1/2 bath on main level   Bathroom Shower/Tub Walk-in shower   Bathroom Toilet Standard   Bathroom Equipment Shower chair   Home Equipment Walker  (rollator )   Prior Function   Level of [place] Independent with ADLs and functional mobility   Lives With Trino Crenshaw Help From   (son & daughter)   ADL Assistance Independent   IADLs Needs assistance  (with errands)   Psychosocial   Psychosocial (WDL) WDL   ADL   Eating Assistance 6  Modified independent   Grooming Assistance 5  Supervision/Setup   UB Bathing Assistance 5  Supervision/Setup   LB Bathing Assistance 5  Supervision/Setup   UB Dressing Assistance 5  Supervision/Setup   LB Dressing Assistance 5  Supervision/Setup   Toileting Assistance  5  Supervision/Setup   Bed Mobility   Supine to Sit 6  Modified independent   Sit to Supine 6  Modified independent   Transfers   Sit to Stand 6  Modified independent   Stand to Sit 6  Modified independent   Toilet transfer 6  Modified independent   Functional Mobility   Functional Mobility 6  Modified independent   Additional items Rolling walker   Balance   Static Sitting Good   Dynamic Sitting Fair +   Static Standing Fair   Dynamic Standing Fair   Ambulatory Fair   Activity Tolerance   Activity Tolerance Patient tolerated treatment well   RUE Assessment   RUE Assessment WFL   LUE Assessment   LUE Assessment WFL   Hand Function   Gross Motor Coordination Functional   Fine Motor Coordination Functional   Sensation   Light Touch No apparent deficits   Proprioception   Proprioception No apparent deficits   Vision - Complex Assessment   Ocular Range of Motion WFL   Cognition   Overall Cognitive Status WFL   Arousal/Participation Alert; Cooperative   Attention Within functional limits   Orientation Level Oriented X4   Memory Within functional limits   Following Commands Follows all commands and directions without difficulty   Assessment   Limitation Decreased high-level ADLs; Decreased Safe judgement during ADL;Decreased endurance   Assessment   Pt is a 66 y o  female seen for OT evaluation s/p admit to [facility] on 5/7/2022 w/ Elevated troponin level not due to acute coronary syndrome    See above for extensive list of comorbidities affecting Pt's functional performance at time of assessment  Personal factors affecting Pt at time of IE include:difficulty performing IADLS , health management  and environment  Upon evaluation: Pt requires supervision for standing and LB ADLs, Toni for transfers and ambulation  Pt with decreased endurance requiring frequent rest breaks  The following deficits impact occupational performance: weakness, decreased strength, decreased balance, decreased tolerance and decreased safety awareness  Pt to benefit from continued skilled OT services while in the hospital to address deficits as defined above and maximize level of functional independence w ADL's and functional mobility  Occupational performance areas to address include: bathing/shower, toilet hygiene, dressing, health maintenance, functional mobility and clothing management  From OT standpoint, recommendation at time of d/c would be home with social support  Goals   STG Time Frame   (1-2 more sessions )   Short Term Goals 1  Pt will complete IADL Toni  2  Pt will complete toilet hygiene Toni   3  Pt will complete 2 step ADL Toni  Plan   Treatment Interventions ADL retraining; Endurance training;Continued evaluation; Energy conservation; Activityengagement   Goal Expiration Date 05/17/22   OT Frequency   (1-2 more sessions )   Recommendation   OT Discharge Recommendation No rehabilitation needs   AM-PAC Daily Activity Inpatient   Lower Body Dressing 3   Bathing 3   Toileting 4   Upper Body Dressing 4   Grooming 4   Eating 4   Daily Activity Raw Score 22   Daily Activity Standardized Score (Calc for Raw Score >=11) 47 1

## 2022-05-10 NOTE — PLAN OF CARE
Problem: PAIN - ADULT  Goal: Verbalizes/displays adequate comfort level or baseline comfort level  Description: Interventions:  - Encourage patient to monitor pain and request assistance  - Assess pain using appropriate pain scale  - Administer analgesics based on type and severity of pain and evaluate response  - Implement non-pharmacological measures as appropriate and evaluate response  - Consider cultural and social influences on pain and pain management  - Notify physician/advanced practitioner if interventions unsuccessful or patient reports new pain  Outcome: Progressing     Problem: SAFETY ADULT  Goal: Patient will remain free of falls  Description: INTERVENTIONS:  - Educate patient/family on patient safety including physical limitations  - Instruct patient to call for assistance with activity   - Consult OT/PT to assist with strengthening/mobility   - Keep Call bell within reach  - Keep bed low and locked with side rails adjusted as appropriate  - Keep care items and personal belongings within reach  - Initiate and maintain comfort rounds  - Make Fall Risk Sign visible to staff  - Offer Toileting Hours, in advance of need  - Initiate/Maintain alarm  - Obtain necessary fall risk management equipment:   - Apply yellow socks and bracelet for high fall risk patients  - Consider moving patient to room near nurses station  Outcome: Progressing     Problem: INFECTION - ADULT  Goal: Absence or prevention of progression during hospitalization  Description: INTERVENTIONS:  - Assess and monitor for signs and symptoms of infection  - Monitor lab/diagnostic results  - Monitor all insertion sites, i e  indwelling lines, tubes, and drains  - Monitor endotracheal if appropriate and nasal secretions for changes in amount and color  - [place] appropriate cooling/warming therapies per order  - Administer medications as ordered  - Instruct and encourage patient and family to use good hand hygiene technique  - Identify and instruct in appropriate isolation precautions for identified infection/condition  Outcome: Progressing

## 2022-05-10 NOTE — ASSESSMENT & PLAN NOTE
Lab Results   Component Value Date    HGBA1C 6 5 (H) 03/30/2022       Recent Labs     05/09/22  1600 05/09/22  2137 05/10/22  0505 05/10/22  1111   POCGLU 115 123 129 140       Blood Sugar Average: Last 72 hrs:  (P) 147 [id number]     · Resume home oral anti diabetic medications at discharge  · Continue carb controlled diet

## 2022-05-10 NOTE — RESPIRATORY THERAPY NOTE
Home Oxygen Qualifying Test     Patient name: Renetta Bazzi        :    Date of Test:  May 10, 2022  Diagnosis:    Home Oxygen Test:    **Medicare Guidelines require item(s) 1-5 on all ambulatory patients or 1 and 2 on non-ambulatory patients  1  Baseline SPO2 on Room Air at rest 93 %   a      2  SPO2 during exertion on Room Air 95  %  a  During exertion monitor SPO2  If SPO2 increases >=89%, do not add supplemental oxygen    3  Test performed during exertion activity  []  Supplemental Home Oxygen is indicated  [x]  Client does not qualify for home oxygen      Respiratory Additional Notes-     Zay Jacome, RT

## 2022-05-10 NOTE — ASSESSMENT & PLAN NOTE
· Status post bilateral stents to the iliac arteries  · Continue outpatient follow-up with Dr Arcelia Funes  · Continue ASA and Lipitor as above 85 yo male with a past history of seizures,   CVA, prostate cancer, BPH with urinary retention,aspiration, labeled as autoimmune encephalitis by the neurology dep't at [facility],with no response to typical therapy, now at [facility] with hypoxia and possible aspiration.He has been residing in a SNF.  He is poorly interactive but appears clinically stable.He is not febrile, is tolerating enteral feeds, and appears volume overloaded.  He has a chronic bradley, urine isolate likely just colonizing naina.  He most likely has recently aspirated.He appears stable from an ID perspective  S/P 5 days of vanco/cefepime 2/5.  Home hospice being planned for future  Suggest:  1.monitor off antibiotics  2.supportive care  3. will defer to neurology service at [facility] on additional w/u  4. disposition per primary service. I do not see a benefit from extending antibiotic courses.No additionalm ID w/u planned, we will stop actively following, please call if ID issues arise

## 2022-05-10 NOTE — PLAN OF CARE
Problem: PAIN - ADULT  Goal: Verbalizes/displays adequate comfort level or baseline comfort level  Description: Interventions:  - Encourage patient to monitor pain and request assistance  - Assess pain using appropriate pain scale  - Administer analgesics based on type and severity of pain and evaluate response  - Implement non-pharmacological measures as appropriate and evaluate response  - Consider cultural and social influences on pain and pain management  - Notify physician/advanced practitioner if interventions unsuccessful or patient reports new pain  Outcome: Progressing     Problem: INFECTION - ADULT  Goal: Absence or prevention of progression during hospitalization  Description: INTERVENTIONS:  - Assess and monitor for signs and symptoms of infection  - Monitor lab/diagnostic results  - Monitor all insertion sites, i e  indwelling lines, tubes, and drains  - Monitor endotracheal if appropriate and nasal secretions for changes in amount and color  - [place] appropriate cooling/warming therapies per order  - Administer medications as ordered  - Instruct and encourage patient and family to use good hand hygiene technique  - Identify and instruct in appropriate isolation precautions for identified infection/condition  Outcome: Progressing     Problem: Knowledge Deficit  Goal: Patient/family/caregiver demonstrates understanding of disease process, treatment plan, medications, and discharge instructions  Description: Complete learning assessment and assess knowledge base    Interventions:  - Provide teaching at level of understanding  - Provide teaching via preferred learning methods  Outcome: Progressing

## 2022-05-10 NOTE — DISCHARGE SUMMARY
[facility]  Discharge- Janay Linda 1950, 66 y o  female MRN: [id number]  Unit/Bed#: 7T [facility] 702-01 Encounter: [id number]  Primary Care Provider: Viraj Carney MD   Date and time admitted to hospital: 5/7/2022  9:49 AM    * Elevated troponin level not due to acute coronary syndrome  Assessment & Plan  · Secondary to demand ischemia from underlying CAD and heart failure  · Recent viral pneumonia with cough, shortness of breath, and pleuritic chest pain  · HS troponin I 139, 132, 111  · EKG with no signs of active ischemia  · TTE (5/9): LVEF 45%  Diastolic function is abnormal   There were wall motion abnormalities that are suggestive of stress-induced Takotsubo's cardiomyopathy  The following segments are dyskinetic: apex  The following segments are akinetic: apical anterior, apical septal, apical inferior and apical lateral  The following segments are hypokinetic: mid anterior, mid inferoseptal, mid inferior and mid anterolateral  The right ventricular systolic pressure is moderately elevated  The estimated right ventricular systolic pressure is 57 60 mmHg  · Continue aspirin 81 mg daily and atorvastatin 40 mg daily  · Cardiology cleared for DC home with outpatient follow-up with her previously established Cardiologist    Viral pneumonia  Assessment & Plan  · Recent admission for viral pneumonia at [facility]  · Chest x-ray with bilateral patchy infiltrates (possible residual from recent infection)  · CT Chest (5/9/22): Mild pulmonary edema and small bibasilar pleural effusions likely on the basis of CHF  Cardiomegaly and thin posterior pericardial effusion measuring a maximum of 5 mm     · Afebrile and without leukocytosis  · Procalcitonin is negative  · Further supportive care     Chronic combined systolic and diastolic heart failure (HCC)  Assessment & Plan  Wt Readings from Last 3 Encounters:   05/10/22 86 4 kg (190 lb 7 6 oz)   03/29/21 88 9 kg (196 lb)   07/13/20 80 3 kg (177 lb)     · Appears euvolemic at this time  · Echocardiogram as above  · Begin Lasix 40 mg daily as recommended by Cardiology at discharge  · Continue daily weights and Is and Os  · Continue fluid and sodium restrictions        Aortoiliac stenosis (HCC)  Assessment & Plan  · Status post bilateral stents to the iliac arteries  · Continue outpatient follow-up with Dr Ernie Nesbitt  · Continue ASA and Lipitor as above    Diabetes mellitus [facility])  Assessment & Plan  Lab Results   Component Value Date    HGBA1C 6 5 (H) 03/30/2022       Recent Labs     05/09/22  1600 05/09/22  2137 05/10/22  0505 05/10/22  1111   POCGLU 115 123 129 140       Blood Sugar Average: Last 72 hrs:  (P) 358 [id number]     · Resume home oral anti diabetic medications at discharge  · Continue carb controlled diet    Hypertension  Assessment & Plan  · Blood pressure has been relatively low during admission  · Continue to hold home amlodipine  · Cardiology recommending transition from amlodipine to metoprolol in the outpatient setting if her blood pressure tolerates      Medical Problems             Resolved Problems  Date Reviewed: 5/8/2022    None              Discharging Physician / Practitioner: Eve Walters DO  PCP: Jose Luis Mesa MD  Admission Date:   Admission Orders (From admission, onward)     Ordered        05/08/22 1016  Inpatient Admission  Once            05/07/22 1752  Place in Observation  Once                      Discharge Date: 05/10/22    Consultations During Hospital Stay:  · Cardiology     Procedures Performed:   · TTE (5/9): LVEF 45%  Diastolic function is abnormal   There were wall motion abnormalities that are suggestive of stress-induced Takotsubo's cardiomyopathy  The following segments are dyskinetic: apex   The following segments are akinetic: apical anterior, apical septal, apical inferior and apical lateral  The following segments are hypokinetic: mid anterior, mid inferoseptal, mid inferior and mid anterolateral  The right ventricular systolic pressure is moderately elevated  The estimated right ventricular systolic pressure is 32 42 mmHg  · CT Chest (5/9/22): Mild pulmonary edema and small bibasilar pleural effusions likely on the basis of CHF  Cardiomegaly and thin posterior pericardial effusion measuring a maximum of 5 mm  Significant Findings / Test Results:   · Echocardiogram as above    Incidental Findings:   · None     Test Results Pending at Discharge (will require follow up): · None     Outpatient Tests Requested:  · To be determined in the outpatient setting upon follow-up with her primary care physician, Cardiology, and vascular    Complications:  None    Reason for Admission:  Elevated troponin    Hospital Course:   Alana Monteiro is a 66 y o  female patient who originally presented to the hospital on 5/7/2022 due to neck pain and shortness of breath  Please see H and P as documented by Dr Pauly Mcneill for complete details regarding history of presenting illness  In brief, the patient has a past medical history of peripheral arterial disease, heart failure, type 2 diabetes who presented with the complaint of dyspnea on exertion  She was recently diagnosed with a viral pneumonia at [facility] and following discharge was doing well up until presentation  In the emergency department she was found to have elevated high sensitivity troponins and medical admission for ACS rule out was advised by Cardiology  During admission high sensitivity troponins remained relatively flat and the patient was treated for mild decompensated heart failure with IV diuretics though her symptoms were likely a combination of recent viral infection and heart failure  The patient was ultimately discharged home with improvement in her symptoms and advised to follow up outpatient with her previously established cardiologist and vascular surgeon    She was discharged in stable condition in all of her questions were answered prior to departure  This is a brief discharge summary please see full medical record for more details  Please see above list of diagnoses and related plan for additional information  Condition at Discharge: good    Discharge Day Visit / Exam:   Subjective:  Patient resting comfortably in bed without any acute complaints  She notes improvement in her cough and shortness of breath this morning  No significant overnight events reported by nursing  Vitals: Blood Pressure: 93/63 (05/10/22 0755)  Pulse: 70 (05/10/22 0755)  Temperature: (!) 97 °F (36 1 °C) (05/10/22 0755)  Temp Source: Temporal (05/10/22 0755)  Respirations: 18 (05/10/22 0755)  Height: 5' 3" (160 cm) (05/09/22 1016)  Weight - Scale: 86 4 kg (190 lb 7 6 oz) (05/10/22 0534)  SpO2: 97 % (05/10/22 0755)     Exam:   Physical Exam  Vitals and nursing note reviewed  Constitutional:       General: She is not in acute distress  Appearance: She is well-developed  She is obese  HENT:      Head: Normocephalic and atraumatic  Mouth/Throat:      Mouth: Mucous membranes are moist       Pharynx: Oropharynx is clear  Eyes:      Extraocular Movements: Extraocular movements intact  Conjunctiva/sclera: Conjunctivae normal    Cardiovascular:      Rate and Rhythm: Normal rate and regular rhythm  Pulses: Normal pulses  Heart sounds: Normal heart sounds  No murmur heard  Pulmonary:      Effort: Pulmonary effort is normal  No respiratory distress  Breath sounds: Normal breath sounds  Abdominal:      General: Bowel sounds are normal  There is no distension  Palpations: Abdomen is soft  Tenderness: There is no abdominal tenderness  Musculoskeletal:         General: Normal range of motion  Cervical back: Normal range of motion and neck supple  Skin:     General: Skin is warm and dry  Neurological:      General: No focal deficit present        Mental Status: She is alert and oriented to person, place, and time  Mental status is at baseline  Psychiatric:         Mood and Affect: Mood normal          Behavior: Behavior normal          Judgment: Judgment normal        Discussion with Family: Patient declined call to   Discharge instructions/Information to patient and family:   See after visit summary for information provided to patient and family  Provisions for Follow-Up Care:  See after visit summary for information related to follow-up care and any pertinent home health orders  Disposition:   Home    Planned Readmission: None     Discharge Statement:  I spent > 35 minutes discharging the patient  This time was spent on the day of discharge  I had direct contact with the patient on the day of discharge  Greater than 50% of the total time was spent examining patient, answering all patient questions, arranging and discussing plan of care with patient as well as directly providing post-discharge instructions  Additional time then spent on discharge activities  Discharge Medications:  See after visit summary for reconciled discharge medications provided to patient and/or family        **Please Note: This note may have been constructed using a voice recognition system**

## 2022-05-13 ENCOUNTER — TELEPHONE (OUTPATIENT)
Dept: VASCULAR SURGERY | Facility: CLINIC | Age: 72
End: 2022-05-13

## 2022-05-13 NOTE — TELEPHONE ENCOUNTER
Patient stated she was in patient at [facility] and had a cath and has blockage  She said she also had dopplers done and she was asking about discussing with dr Ju Tapia options to have heart procedures with her previously blocked vessels in legs  I offered her an appt to review but after a lengthy conversation , she decided to wait until she speaks with her primary    She did not want to make an appt at this time

## 2022-05-19 ENCOUNTER — APPOINTMENT (EMERGENCY)
Dept: RADIOLOGY | Facility: HOSPITAL | Age: 72
DRG: 246 | End: 2022-05-19
Payer: COMMERCIAL

## 2022-05-19 ENCOUNTER — APPOINTMENT (EMERGENCY)
Dept: CT IMAGING | Facility: HOSPITAL | Age: 72
DRG: 246 | End: 2022-05-19
Payer: COMMERCIAL

## 2022-05-19 ENCOUNTER — HOSPITAL ENCOUNTER (EMERGENCY)
Facility: HOSPITAL | Age: 72
DRG: 246 | End: 2022-05-20
Attending: EMERGENCY MEDICINE
Payer: COMMERCIAL

## 2022-05-19 DIAGNOSIS — I21.4 NSTEMI (NON-ST ELEVATED MYOCARDIAL INFARCTION) (HCC): Primary | ICD-10-CM

## 2022-05-19 DIAGNOSIS — I25.10 CAD (CORONARY ARTERY DISEASE): ICD-10-CM

## 2022-05-19 DIAGNOSIS — R79.89 ELEVATED BRAIN NATRIURETIC PEPTIDE (BNP) LEVEL: ICD-10-CM

## 2022-05-19 DIAGNOSIS — I95.9 TRANSIENT HYPOTENSION: ICD-10-CM

## 2022-05-19 PROBLEM — J44.9 COPD WITHOUT EXACERBATION (HCC): Status: ACTIVE | Noted: 2022-05-19

## 2022-05-19 PROBLEM — R07.9 CHEST PAIN: Status: ACTIVE | Noted: 2022-05-19

## 2022-05-19 LAB
2HR DELTA HS TROPONIN: 415 NG/L
4HR DELTA HS TROPONIN: 665 NG/L
ANION GAP SERPL CALCULATED.3IONS-SCNC: 12 MMOL/L (ref 4–13)
APTT PPP: 21 SECONDS (ref 23–37)
BASOPHILS # BLD AUTO: 0.04 THOUSANDS/ΜL (ref 0–0.1)
BASOPHILS NFR BLD AUTO: 1 % (ref 0–1)
BUN SERPL-MCNC: 7 MG/DL (ref 5–25)
CALCIUM SERPL-MCNC: 8.9 MG/DL (ref 8.3–10.1)
CARDIAC TROPONIN I PNL SERPL HS: 482 NG/L
CARDIAC TROPONIN I PNL SERPL HS: 67 NG/L
CARDIAC TROPONIN I PNL SERPL HS: 732 NG/L
CHLORIDE SERPL-SCNC: 107 MMOL/L (ref 100–108)
CO2 SERPL-SCNC: 22 MMOL/L (ref 21–32)
CREAT SERPL-MCNC: 1.05 MG/DL (ref 0.6–1.3)
EOSINOPHIL # BLD AUTO: 0.07 THOUSAND/ΜL (ref 0–0.61)
EOSINOPHIL NFR BLD AUTO: 1 % (ref 0–6)
ERYTHROCYTE [DISTWIDTH] IN BLOOD BY AUTOMATED COUNT: 14.5 % (ref 11.6–15.1)
GFR SERPL CREATININE-BSD FRML MDRD: 52 ML/MIN/1.73SQ M
GLUCOSE SERPL-MCNC: 132 MG/DL (ref 65–140)
GLUCOSE SERPL-MCNC: 140 MG/DL (ref 65–140)
HCT VFR BLD AUTO: 38.8 % (ref 34.8–46.1)
HGB BLD-MCNC: 11.8 G/DL (ref 11.5–15.4)
IMM GRANULOCYTES # BLD AUTO: 0.02 THOUSAND/UL (ref 0–0.2)
IMM GRANULOCYTES NFR BLD AUTO: 0 % (ref 0–2)
INR PPP: 0.98 (ref 0.84–1.19)
LYMPHOCYTES # BLD AUTO: 1.98 THOUSANDS/ΜL (ref 0.6–4.47)
LYMPHOCYTES NFR BLD AUTO: 30 % (ref 14–44)
MCH RBC QN AUTO: 28.3 PG (ref 26.8–34.3)
MCHC RBC AUTO-ENTMCNC: 30.4 G/DL (ref 31.4–37.4)
MCV RBC AUTO: 93 FL (ref 82–98)
MONOCYTES # BLD AUTO: 0.64 THOUSAND/ΜL (ref 0.17–1.22)
MONOCYTES NFR BLD AUTO: 10 % (ref 4–12)
NEUTROPHILS # BLD AUTO: 3.9 THOUSANDS/ΜL (ref 1.85–7.62)
NEUTS SEG NFR BLD AUTO: 58 % (ref 43–75)
NRBC BLD AUTO-RTO: 0 /100 WBCS
NT-PROBNP SERPL-MCNC: 5497 PG/ML
PLATELET # BLD AUTO: 129 THOUSANDS/UL (ref 149–390)
PMV BLD AUTO: 10.3 FL (ref 8.9–12.7)
POTASSIUM SERPL-SCNC: 3.3 MMOL/L (ref 3.5–5.3)
PROTHROMBIN TIME: 12.7 SECONDS (ref 11.6–14.5)
RBC # BLD AUTO: 4.17 MILLION/UL (ref 3.81–5.12)
SODIUM SERPL-SCNC: 141 MMOL/L (ref 136–145)
WBC # BLD AUTO: 6.65 THOUSAND/UL (ref 4.31–10.16)

## 2022-05-19 PROCEDURE — 36415 COLL VENOUS BLD VENIPUNCTURE: CPT | Performed by: EMERGENCY MEDICINE

## 2022-05-19 PROCEDURE — G1004 CDSM NDSC: HCPCS

## 2022-05-19 PROCEDURE — 85730 THROMBOPLASTIN TIME PARTIAL: CPT | Performed by: EMERGENCY MEDICINE

## 2022-05-19 PROCEDURE — 94640 AIRWAY INHALATION TREATMENT: CPT

## 2022-05-19 PROCEDURE — 96366 THER/PROPH/DIAG IV INF ADDON: CPT

## 2022-05-19 PROCEDURE — 71275 CT ANGIOGRAPHY CHEST: CPT

## 2022-05-19 PROCEDURE — 83880 ASSAY OF NATRIURETIC PEPTIDE: CPT | Performed by: EMERGENCY MEDICINE

## 2022-05-19 PROCEDURE — 85025 COMPLETE CBC W/AUTO DIFF WBC: CPT | Performed by: EMERGENCY MEDICINE

## 2022-05-19 PROCEDURE — 99292 CRITICAL CARE ADDL 30 MIN: CPT | Performed by: EMERGENCY MEDICINE

## 2022-05-19 PROCEDURE — 84484 ASSAY OF TROPONIN QUANT: CPT | Performed by: EMERGENCY MEDICINE

## 2022-05-19 PROCEDURE — 96375 TX/PRO/DX INJ NEW DRUG ADDON: CPT

## 2022-05-19 PROCEDURE — 99214 OFFICE O/P EST MOD 30 MIN: CPT | Performed by: INTERNAL MEDICINE

## 2022-05-19 PROCEDURE — 93005 ELECTROCARDIOGRAM TRACING: CPT

## 2022-05-19 PROCEDURE — 96365 THER/PROPH/DIAG IV INF INIT: CPT

## 2022-05-19 PROCEDURE — 99291 CRITICAL CARE FIRST HOUR: CPT | Performed by: EMERGENCY MEDICINE

## 2022-05-19 PROCEDURE — 85610 PROTHROMBIN TIME: CPT | Performed by: EMERGENCY MEDICINE

## 2022-05-19 PROCEDURE — 71045 X-RAY EXAM CHEST 1 VIEW: CPT

## 2022-05-19 PROCEDURE — 99291 CRITICAL CARE FIRST HOUR: CPT

## 2022-05-19 PROCEDURE — 80048 BASIC METABOLIC PNL TOTAL CA: CPT | Performed by: EMERGENCY MEDICINE

## 2022-05-19 PROCEDURE — 82948 REAGENT STRIP/BLOOD GLUCOSE: CPT

## 2022-05-19 RX ORDER — HEPARIN SODIUM 1000 [USP'U]/ML
2000 INJECTION, SOLUTION INTRAVENOUS; SUBCUTANEOUS
Status: DISCONTINUED | OUTPATIENT
Start: 2022-05-19 | End: 2022-05-20 | Stop reason: HOSPADM

## 2022-05-19 RX ORDER — BENZONATATE 100 MG/1
100 CAPSULE ORAL 3 TIMES DAILY PRN
Status: DISCONTINUED | OUTPATIENT
Start: 2022-05-19 | End: 2022-05-20 | Stop reason: HOSPADM

## 2022-05-19 RX ORDER — ALBUTEROL SULFATE 2.5 MG/3ML
2.5 SOLUTION RESPIRATORY (INHALATION) EVERY 6 HOURS PRN
Status: DISCONTINUED | OUTPATIENT
Start: 2022-05-19 | End: 2022-05-20 | Stop reason: HOSPADM

## 2022-05-19 RX ORDER — FLUTICASONE FUROATE, UMECLIDINIUM BROMIDE AND VILANTEROL TRIFENATATE 100; 62.5; 25 UG/1; UG/1; UG/1
1 POWDER RESPIRATORY (INHALATION) DAILY
COMMUNITY
End: 2022-06-01

## 2022-05-19 RX ORDER — PANTOPRAZOLE SODIUM 40 MG/1
40 TABLET, DELAYED RELEASE ORAL
Status: DISCONTINUED | OUTPATIENT
Start: 2022-05-20 | End: 2022-05-20 | Stop reason: HOSPADM

## 2022-05-19 RX ORDER — CALCIUM CARBONATE 200(500)MG
1000 TABLET,CHEWABLE ORAL DAILY PRN
Status: DISCONTINUED | OUTPATIENT
Start: 2022-05-19 | End: 2022-05-20 | Stop reason: HOSPADM

## 2022-05-19 RX ORDER — LANOLIN ALCOHOL/MO/W.PET/CERES
6 CREAM (GRAM) TOPICAL
Status: DISCONTINUED | OUTPATIENT
Start: 2022-05-19 | End: 2022-05-20 | Stop reason: HOSPADM

## 2022-05-19 RX ORDER — FUROSEMIDE 40 MG/1
40 TABLET ORAL DAILY
Status: DISCONTINUED | OUTPATIENT
Start: 2022-05-20 | End: 2022-05-20 | Stop reason: HOSPADM

## 2022-05-19 RX ORDER — MECLIZINE HYDROCHLORIDE 25 MG/1
25 TABLET ORAL EVERY 12 HOURS PRN
Status: DISCONTINUED | OUTPATIENT
Start: 2022-05-19 | End: 2022-05-20 | Stop reason: HOSPADM

## 2022-05-19 RX ORDER — HEPARIN SODIUM 1000 [USP'U]/ML
4000 INJECTION, SOLUTION INTRAVENOUS; SUBCUTANEOUS
Status: DISCONTINUED | OUTPATIENT
Start: 2022-05-19 | End: 2022-05-20 | Stop reason: HOSPADM

## 2022-05-19 RX ORDER — HEPARIN SODIUM 1000 [USP'U]/ML
6800 INJECTION, SOLUTION INTRAVENOUS; SUBCUTANEOUS ONCE
Status: COMPLETED | OUTPATIENT
Start: 2022-05-19 | End: 2022-05-19

## 2022-05-19 RX ORDER — INSULIN LISPRO 100 [IU]/ML
1-5 INJECTION, SOLUTION INTRAVENOUS; SUBCUTANEOUS
Status: DISCONTINUED | OUTPATIENT
Start: 2022-05-20 | End: 2022-05-20 | Stop reason: HOSPADM

## 2022-05-19 RX ORDER — ONDANSETRON 2 MG/ML
4 INJECTION INTRAMUSCULAR; INTRAVENOUS EVERY 6 HOURS PRN
Status: DISCONTINUED | OUTPATIENT
Start: 2022-05-19 | End: 2022-05-20 | Stop reason: HOSPADM

## 2022-05-19 RX ORDER — HEPARIN SODIUM 1000 [USP'U]/ML
3400 INJECTION, SOLUTION INTRAVENOUS; SUBCUTANEOUS
Status: DISCONTINUED | OUTPATIENT
Start: 2022-05-19 | End: 2022-05-19

## 2022-05-19 RX ORDER — HEPARIN SODIUM 10000 [USP'U]/100ML
3-20 INJECTION, SOLUTION INTRAVENOUS
Status: DISCONTINUED | OUTPATIENT
Start: 2022-05-19 | End: 2022-05-20 | Stop reason: HOSPADM

## 2022-05-19 RX ORDER — INSULIN LISPRO 100 [IU]/ML
1-5 INJECTION, SOLUTION INTRAVENOUS; SUBCUTANEOUS
Status: DISCONTINUED | OUTPATIENT
Start: 2022-05-19 | End: 2022-05-20 | Stop reason: HOSPADM

## 2022-05-19 RX ORDER — HEPARIN SODIUM 10000 [USP'U]/100ML
3-30 INJECTION, SOLUTION INTRAVENOUS
Status: DISCONTINUED | OUTPATIENT
Start: 2022-05-19 | End: 2022-05-19

## 2022-05-19 RX ORDER — ASPIRIN 81 MG/1
81 TABLET ORAL DAILY
Status: DISCONTINUED | OUTPATIENT
Start: 2022-05-20 | End: 2022-05-20 | Stop reason: HOSPADM

## 2022-05-19 RX ORDER — ATORVASTATIN CALCIUM 10 MG/1
10 TABLET, FILM COATED ORAL
Status: DISCONTINUED | OUTPATIENT
Start: 2022-05-20 | End: 2022-05-20

## 2022-05-19 RX ORDER — FLUTICASONE FUROATE AND VILANTEROL 100; 25 UG/1; UG/1
1 POWDER RESPIRATORY (INHALATION) DAILY
Status: DISCONTINUED | OUTPATIENT
Start: 2022-05-20 | End: 2022-05-20 | Stop reason: HOSPADM

## 2022-05-19 RX ORDER — HEPARIN SODIUM 1000 [USP'U]/ML
6800 INJECTION, SOLUTION INTRAVENOUS; SUBCUTANEOUS
Status: DISCONTINUED | OUTPATIENT
Start: 2022-05-19 | End: 2022-05-19

## 2022-05-19 RX ORDER — ACETAMINOPHEN 325 MG/1
650 TABLET ORAL EVERY 6 HOURS PRN
Status: DISCONTINUED | OUTPATIENT
Start: 2022-05-19 | End: 2022-05-20 | Stop reason: HOSPADM

## 2022-05-19 RX ORDER — BENZONATATE 100 MG/1
100 CAPSULE ORAL 3 TIMES DAILY PRN
COMMUNITY
End: 2022-06-01

## 2022-05-19 RX ADMIN — ALBUTEROL SULFATE 2.5 MG: 2.5 SOLUTION RESPIRATORY (INHALATION) at 23:22

## 2022-05-19 RX ADMIN — IOHEXOL 75 ML: 350 INJECTION, SOLUTION INTRAVENOUS at 19:56

## 2022-05-19 RX ADMIN — MELATONIN 6 MG: at 23:01

## 2022-05-19 RX ADMIN — HEPARIN SODIUM 11.8 UNITS/KG/HR: 10000 INJECTION, SOLUTION INTRAVENOUS at 21:46

## 2022-05-19 RX ADMIN — BENZONATATE 100 MG: 100 CAPSULE ORAL at 23:01

## 2022-05-19 RX ADMIN — HEPARIN SODIUM 18 UNITS/KG/HR: 10000 INJECTION, SOLUTION INTRAVENOUS at 20:05

## 2022-05-19 RX ADMIN — HEPARIN SODIUM 6800 UNITS: 1000 INJECTION INTRAVENOUS; SUBCUTANEOUS at 20:05

## 2022-05-19 NOTE — ED NOTES
Patient transported to [facility] Mendez Munguia RN  05/19/22 3655

## 2022-05-19 NOTE — ED PROVIDER NOTES
History  Chief Complaint   Patient presents with    Chest Pain     Pt reports chest pain beginning around 2pm while eating lunch  Denies dizziness or sob with pain  Reports pain has resolved at this time  324mg asa and 1 sublingual nitro given by ems     75y F here for evaluation of chest pain  Pt is a very difficult historian, requires frequent redirection back to her current issue  Around 2pm today, started w/ left sided chest pressure, some more focused at times w/o sig radiation  Assoc w/ some dyspnea, no diaphoresis, no n/v   Pain relatively constant until she finally called EMS  Given 324 mg asa and SL NT x1 in ambulance and reports pain resolved  During questioning, pt did say the pain started to return, but less than 5 minutes later said it was gone again  Denies f/c/s, no cough/congestion, appetite okay  Reports some abd bloating  Pt said shej's taking her meds, but unable to really go over them well  Asked specifically about her eliquis for her dvt - she said she didn't take it today  Pt w/ sig recent PMH  10/8/2021 - extensive LLE dvt, started on Eliquis  Admitted to [facility] 4/14-4/22, influenza a/viral pneumonia,   Echo 4/15:   EF 45-50%, anteroapical dyskinesis, suggestive of stress cardiomyopathy, mild diastolic dysfunction, RA and RV normal  Cardiac cath 4/21showed findings of 3 vessel coronary artery disease Cardiac catheterization displayed severe three vessel CAD  It appears the team felt best tx was medical: "Complex decision for CABG vs PCI vs maximal medical therapy in the setting of PAD, subclavian stenosis, multiple medical co-morbidities with severe CAD  Continue medical therapy for now with ASA, BB, and statin"    Admitted 5/7-5/10 to [facility] for low blood pressures, continued trop elevations    Upon review, it doesn't appear she was placed on her eliquis with that admission      History provided by:  Patient, medical records and relative   used: No    Chest Pain  Pain location:  L chest  Pain quality: pressure    Pain radiates to:  Does not radiate  Pain severity:  Moderate  Onset quality:  Gradual  Timing:  Constant  Progression:  Resolved  Chronicity:  Recurrent  Context: at rest and stress    Context comment:  With activity  Relieved by:  Aspirin and nitroglycerin  Worsened by:  Exertion and movement  Associated symptoms: fatigue and shortness of breath    Associated symptoms: no abdominal pain, no altered mental status, no anorexia, no cough, no diaphoresis, no dizziness, no fever, no heartburn, no lower extremity edema, no nausea, no numbness, no palpitations and no weakness    Risk factors: coronary artery disease, high cholesterol, hypertension, prior DVT/PE and smoking (former)        Prior to Admission Medications   Prescriptions Last Dose Informant Patient Reported? Taking? Melatonin 5 MG TABS  Self Yes Yes   Sig: Take 5 mg by mouth   RA COUGH DM 30 MG/5ML SUER Not Taking at Unknown time Self Yes No   Sig: take 10 milliliters by mouth twice a day   Patient not taking: Reported on 5/19/2022   apixaban (Eliquis) 5 mg   Yes Yes   Sig: Take 5 mg by mouth in the morning and 5 mg in the evening  aspirin (ECOTRIN LOW STRENGTH) 81 mg EC tablet  Self No Yes   Sig: Take 1 tablet (81 mg total) by mouth daily   atorvastatin (LIPITOR) 10 mg tablet  Self Yes Yes   Sig: Lipitor 10 MG Oral Tablet   Refills: 0    Active- 1 tab daily   benzonatate (TESSALON PERLES) 100 mg capsule   Yes Yes   Sig: Take 100 mg by mouth as needed in the morning and 100 mg as needed at noon and 100 mg as needed in the evening for cough     cyclobenzaprine (FLEXERIL) 10 mg tablet  Self Yes Yes   Sig: Cyclobenzaprine HCl - 10 MG Oral Tablet   Refills: 0    Active- 1 tab q hs prn - pt reporting taking 0 5 tablet if needed   fluticasone (FLONASE) 50 mcg/act nasal spray Not Taking at Unknown time Self Yes No   Sig: Fluticasone Propionate 50 MCG/ACT Nasal Suspension   Refills: 0    Active- 2 sprays each nostril daily prn   Patient not taking: Reported on 5/19/2022   fluticasone-umeclidinium-vilanterol (Trelegy Ellipta) 100-62 5-25 MCG/INH inhaler   Yes Yes   Sig: Inhale 1 puff daily Rinse mouth after use  furosemide (Lasix) 40 mg tablet   No Yes   Sig: Take 1 tablet (40 mg total) by mouth daily   Patient taking differently: Take 20 mg by mouth in the morning     lansoprazole (PREVACID) 30 mg capsule  Self Yes Yes   Sig: Take 30 mg by mouth daily   meclizine (ANTIVERT) 25 mg tablet  Self Yes Yes   Sig: Take 25 mg by mouth daily    meloxicam (MOBIC) 7 5 mg tablet Not Taking at Unknown time Self Yes No   Sig: Take 7 5 mg by mouth daily   Patient not taking: Reported on 5/19/2022   metFORMIN (GLUCOPHAGE) 850 mg tablet  Self Yes Yes   Sig: Take 850 mg by mouth 2 (two) times a day with meals   metoprolol tartrate (LOPRESSOR) 25 mg tablet   Yes Yes   Sig: Take 25 mg by mouth every 12 (twelve) hours   montelukast (SINGULAIR) 10 mg tablet  Self Yes No   Sig: Take 10 mg by mouth   oxyCODONE (ROXICODONE) 5 mg immediate release tablet Not Taking at Unknown time Self No No   Sig: Take 1-2 tablets by mouth every 4 (four) hours as needed for moderate pain Max Daily Amount: 60 mg   Patient not taking: No sig reported      Facility-Administered Medications: None       Past Medical History:   Diagnosis Date    Acid reflux     Carotid artery disease (HCC)     Diabetes mellitus (HCC)     NIDDM    Edema     bles    Full dentures     History of hepatitis C     History of shingles     healed    Hx of renal calculi     Hyperlipidemia     Hypertension     Kidney stone     Liver disease     Nocturnal leg cramps     OA (osteoarthritis) of knee     left    PVD (peripheral vascular disease) (HCC)     S/P insertion of iliac artery stent     miles    Urinary incontinence     Wears glasses        Past Surgical History:   Procedure Laterality Date    APPENDECTOMY      COLONOSCOPY      ESOPHAGEAL DILATION      ESOPHAGOGASTRODUODENOSCOPY      ILIAC ARTERY STENT Bilateral     KIDNEY STONE SURGERY      AK TOTAL KNEE ARTHROPLASTY Left 3/7/2017    Procedure: ARTHROPLASTY KNEE TOTAL;  Surgeon: Lala Wang MD;  Location: [facility] OR;  Service: Orthopedics       Family History   Family history unknown: Yes     I have reviewed and agree with the history as documented  E-Cigarette/Vaping    E-Cigarette Use Never User      E-Cigarette/Vaping Substances    Nicotine No     THC No     CBD No     Flavoring No     Other No     Unknown No      Social History     Tobacco Use    Smoking status: Former Smoker     Quit date: 2001     Years since quittin 4    Smokeless tobacco: Never Used   Vaping Use    Vaping Use: Never used   Substance Use Topics    Alcohol use: Never    Drug use: No       Review of Systems   Constitutional: Positive for fatigue  Negative for diaphoresis and fever  Respiratory: Positive for shortness of breath  Negative for cough  Cardiovascular: Positive for chest pain  Negative for palpitations  Gastrointestinal: Negative for abdominal pain, anorexia, heartburn and nausea  Neurological: Negative for dizziness, weakness and numbness  All other systems reviewed and are negative  Physical Exam  Physical Exam  Vitals and nursing note reviewed  Constitutional:       General: She is not in acute distress  Appearance: Normal appearance  She is obese  She is not ill-appearing, toxic-appearing or diaphoretic  HENT:      Nose: Nose normal    Eyes:      Conjunctiva/sclera: Conjunctivae normal    Cardiovascular:      Rate and Rhythm: Normal rate and regular rhythm  Pulses: Normal pulses  Pulmonary:      Effort: Pulmonary effort is normal       Breath sounds: Normal breath sounds  Abdominal:      Palpations: Abdomen is soft  Tenderness: There is no abdominal tenderness  Musculoskeletal:         General: No tenderness or deformity        Cervical back: Normal range of motion  Skin:     General: Skin is warm  Neurological:      General: No focal deficit present  Mental Status: She is alert     Psychiatric:         Mood and Affect: Mood normal          Vital Signs  ED Triage Vitals   Temperature Pulse Respirations Blood Pressure SpO2   05/19/22 1813 05/19/22 1809 05/19/22 1809 05/19/22 1809 05/19/22 1809   98 2 °F (36 8 °C) 93 18 (!) 165/122 93 %      Temp Source Heart Rate Source Patient Position - Orthostatic VS BP Location FiO2 (%)   05/19/22 1813 05/19/22 1809 05/19/22 1809 05/19/22 1809 --   Oral Monitor Lying Right arm       Pain Score       05/19/22 2224       No Pain           Vitals:    05/20/22 0455 05/20/22 0750 05/20/22 0952 05/20/22 1000   BP: 119/60 108/67 92/51 94/60   Pulse: 94 87 80 80   Patient Position - Orthostatic VS: Lying Lying           Visual Acuity      ED Medications  Medications   nitroglycerin (NITRO-BID) 2 % TD ointment 1 inch (0 inches Topical Hold 5/19/22 1849)   heparin (porcine) 25,000 units in 0 45% NaCl 250 mL infusion (premix) (13 8 Units/kg/hr × 85 kg (Order-Specific) Intravenous Rate/Dose Change 5/20/22 1048)   heparin (porcine) injection 4,000 Units (has no administration in time range)   heparin (porcine) injection 2,000 Units (2,000 Units Intravenous Given 5/20/22 1047)   aspirin (ECOTRIN LOW STRENGTH) EC tablet 81 mg (81 mg Oral Given 5/20/22 0952)   atorvastatin (LIPITOR) tablet 10 mg (has no administration in time range)   benzonatate (TESSALON PERLES) capsule 100 mg (100 mg Oral Given 5/19/22 2301)   fluticasone-vilanterol (BREO ELLIPTA) 100-25 mcg/inh inhaler 1 puff (1 puff Inhalation Given 5/20/22 0952)   furosemide (LASIX) tablet 40 mg (40 mg Oral Not Given 5/20/22 0953)   pantoprazole (PROTONIX) EC tablet 40 mg (40 mg Oral Given 5/20/22 0506)   meclizine (ANTIVERT) tablet 25 mg (has no administration in time range)   melatonin tablet 6 mg (6 mg Oral Given 5/19/22 2301)   acetaminophen (TYLENOL) tablet 650 mg (has no administration in time range)   calcium carbonate (TUMS) chewable tablet 1,000 mg (has no administration in time range)   ondansetron (ZOFRAN) injection 4 mg (has no administration in time range)   insulin lispro (HumaLOG) 100 units/mL subcutaneous injection 1-5 Units (1 Units Subcutaneous Not Given 5/20/22 1036)   insulin lispro (HumaLOG) 100 units/mL subcutaneous injection 1-5 Units (1 Units Subcutaneous Not Given 5/19/22 2302)   albuterol inhalation solution 2 5 mg (2 5 mg Nebulization Given 5/19/22 2322)   furosemide (LASIX) injection 40 mg (has no administration in time range)   heparin (porcine) injection 6,800 Units (6,800 Units Intravenous Given 5/19/22 2005)   iohexol (OMNIPAQUE) 350 MG/ML injection (MULTI-DOSE) 75 mL (75 mL Intravenous Given 5/19/22 1956)       Diagnostic Studies  Results Reviewed     Procedure Component Value Units Date/Time    High Sensitivity Troponin I Random [[id number]]     Lab Status: No result Specimen: Blood     APTT [[id number]]  (Abnormal) Collected: 05/20/22 0943    Lab Status: Final result Specimen: Blood from Arm, Right Updated: 05/20/22 1029     PTT 52 seconds     Basic metabolic panel [[id number]]  (Abnormal) Collected: 05/20/22 0453    Lab Status: Final result Specimen: Blood from Arm, Left Updated: 05/20/22 0522     Sodium 142 mmol/L      Potassium 3 4 mmol/L      Chloride 107 mmol/L      CO2 24 mmol/L      ANION GAP 11 mmol/L      BUN 6 mg/dL      Creatinine 0 90 mg/dL      Glucose 127 mg/dL      Calcium 8 5 mg/dL      eGFR 63 ml/min/1 73sq m     Narrative:      [place] guidelines for Chronic Kidney Disease (CKD):     Stage 1 with normal or high GFR (GFR > 90 mL/min/1 73 square meters)    Stage 2 Mild CKD (GFR = 60-89 mL/min/1 73 square meters)    Stage 3A Moderate CKD (GFR = 45-59 mL/min/1 73 square meters)    Stage 3B Moderate CKD (GFR = 30-44 mL/min/1 73 square meters)    Stage 4 Severe CKD (GFR = 15-29 mL/min/1 73 square meters)    Stage 5 End Stage CKD (GFR <15 mL/min/1 73 square meters)  Note: GFR calculation is accurate only with a steady state creatinine    CBC and differential [[id number]] Collected: 05/20/22 0453    Lab Status: Final result Specimen: Blood from Arm, Left Updated: 05/20/22 0507     WBC 8 69 Thousand/uL      RBC 4 14 Million/uL      Hemoglobin 12 2 g/dL      Hematocrit 38 9 %      MCV 94 fL      MCH 29 5 pg      MCHC 31 4 g/dL      RDW 14 6 %      MPV 10 6 fL      Platelets 991 Thousands/uL      nRBC 0 /100 WBCs      Neutrophils Relative 51 %      Immat GRANS % 1 %      Lymphocytes Relative 36 %      Monocytes Relative 10 %      Eosinophils Relative 1 %      Basophils Relative 1 %      Neutrophils Absolute 4 51 Thousands/µL      Immature Grans Absolute 0 04 Thousand/uL      Lymphocytes Absolute 3 11 Thousands/µL      Monocytes Absolute 0 87 Thousand/µL      Eosinophils Absolute 0 09 Thousand/µL      Basophils Absolute 0 07 Thousands/µL     APTT [[id number]]  (Abnormal) Collected: 05/20/22 0138    Lab Status: Final result Specimen: Blood from Arm, Right Updated: 05/20/22 0237     PTT 82 seconds     HS Troponin I 4hr [[id number]]  (Abnormal) Collected: 05/19/22 2245    Lab Status: Final result Specimen: Blood from Hand, Right Updated: 05/19/22 2326     hs TnI 4hr 732 ng/L      Delta 4hr hsTnI 665 ng/L     Fingerstick Glucose (POCT) [[id number]]  (Normal) Collected: 05/19/22 2258    Lab Status: Final result Updated: 05/19/22 2259     POC Glucose 132 mg/dl     HS Troponin I 2hr [[id number]]  (Abnormal) Collected: 05/19/22 2030    Lab Status: Final result Specimen: Blood from Hand, Right Updated: 05/19/22 2104     hs TnI 2hr 482 ng/L      Delta 2hr hsTnI 415 ng/L     APTT [[id number]]  (Abnormal) Collected: 05/19/22 1925    Lab Status: Final result Specimen: Blood from Arm, Left Updated: 05/19/22 2007     PTT 21 seconds     Protime-INR [[id number]]  (Normal) Collected: 05/19/22 1925    Lab Status: Final result Specimen: Blood from Arm, Left Updated: 05/19/22 2007     Protime 12 7 seconds      INR 0 98    HS Troponin 0hr (reflex protocol) [[id number]]  (Abnormal) Collected: 05/19/22 1821    Lab Status: Final result Specimen: Blood from Arm, Left Updated: 05/19/22 1852     hs TnI 0hr 67 ng/L     NT-BNP PRO [[id number]]  (Abnormal) Collected: 05/19/22 1821    Lab Status: Final result Specimen: Blood from Arm, Left Updated: 05/19/22 1851     NT-proBNP 5,497 pg/mL     Basic metabolic panel [[id number]]  (Abnormal) Collected: 05/19/22 1821    Lab Status: Final result Specimen: Blood from Arm, Left Updated: 05/19/22 1851     Sodium 141 mmol/L      Potassium 3 3 mmol/L      Chloride 107 mmol/L      CO2 22 mmol/L      ANION GAP 12 mmol/L      BUN 7 mg/dL      Creatinine 1 05 mg/dL      Glucose 140 mg/dL      Calcium 8 9 mg/dL      eGFR 52 ml/min/1 73sq m     Narrative:      [facility] guidelines for Chronic Kidney Disease (CKD):     Stage 1 with normal or high GFR (GFR > 90 mL/min/1 73 square meters)    Stage 2 Mild CKD (GFR = 60-89 mL/min/1 73 square meters)    Stage 3A Moderate CKD (GFR = 45-59 mL/min/1 73 square meters)    Stage 3B Moderate CKD (GFR = 30-44 mL/min/1 73 square meters)    Stage 4 Severe CKD (GFR = 15-29 mL/min/1 73 square meters)    Stage 5 End Stage CKD (GFR <15 mL/min/1 73 square meters)  Note: GFR calculation is accurate only with a steady state creatinine    CBC and differential [[id number]]  (Abnormal) Collected: 05/19/22 1821    Lab Status: Final result Specimen: Blood from Arm, Left Updated: 05/19/22 1835     WBC 6 65 Thousand/uL      RBC 4 17 Million/uL      Hemoglobin 11 8 g/dL      Hematocrit 38 8 %      MCV 93 fL      MCH 28 3 pg      MCHC 30 4 g/dL      RDW 14 5 %      MPV 10 3 fL      Platelets 533 Thousands/uL      nRBC 0 /100 WBCs      Neutrophils Relative 58 %      Immat GRANS % 0 %      Lymphocytes Relative 30 %      Monocytes Relative 10 %      Eosinophils Relative 1 %      Basophils Relative 1 % Neutrophils Absolute 3 90 Thousands/µL      Immature Grans Absolute 0 02 Thousand/uL      Lymphocytes Absolute 1 98 Thousands/µL      Monocytes Absolute 0 64 Thousand/µL      Eosinophils Absolute 0 07 Thousand/µL      Basophils Absolute 0 04 Thousands/µL                  CTA ED chest PE study   ED Interpretation by Kaelyn Ackerman DO (05/19 2128)   Abnormal   See below      Final Result by Daryn Manuel MD (05/19 2118)      1  No evidence of pulmonary embolism  2   Pulmonary edema and mild dependent atelectasis  Interval resolution of small bilateral pleural effusions  3   5 mm nodule in the left lower lobe  Based on current Fleischner Society 2017 Guidelines on incidental pulmonary nodule, no routine follow-up is needed if the patient is low risk  If the patient is high risk, optional follow-up chest CT at 12 months    can be considered  Workstation performed: [id number]         XR chest 1 view portable   ED Interpretation by Kaelyn Ackerman DO (05/19 2139)   Abnormal   Increased markings throughout      Final Result by Gilbert Myers MD (05/20 1869)      Congestive changes appear improved                    Workstation performed: [id number]                    Procedures  ECG 12 Lead Documentation Only    Date/Time: 5/19/2022 6:07 PM  Performed by: Kaelyn Ackerman DO  Authorized by: Kaelyn Ackerman DO     ECG reviewed by me, the ED Provider: yes    Patient location:  ED  Previous ECG:     Previous ECG:  Compared to current    Similarity:  Changes noted  Interpretation:     Interpretation: abnormal    Rate:     ECG rate:  92    ECG rate assessment: normal    Rhythm:     Rhythm: sinus rhythm    Ectopy:     Ectopy: none    QRS:     QRS axis:  Normal  ST segments:     ST segments:  Non-specific  T waves:     T waves: inverted      Inverted:  V4, V5, V6, I and aVL  ECG 12 Lead Documentation Only    Date/Time: 5/19/2022 6:23 PM  Performed by: Kaelyn Ackerman DO  Authorized by: Victor Hugo Davis DO     Indications / Diagnosis:  Pain  ECG reviewed by me, the ED Provider: yes    Patient location:  ED  Previous ECG:     Previous ECG:  Compared to current    Similarity:  No change  Interpretation:     Interpretation: abnormal    Rate:     ECG rate:  86    ECG rate assessment: normal    Rhythm:     Rhythm: sinus rhythm    QRS:     QRS axis:  Normal  ST segments:     ST segments:  Non-specific  T waves:     T waves: inverted      Inverted:  V6, V5, I, aVL and V4  ECG 12 Lead Documentation Only    Date/Time: 5/19/2022 8:24 PM  Performed by: Victor Hugo Davis DO  Authorized by: Victor Hugo Davis DO     Indications / Diagnosis:  Delta  ECG reviewed by me, the ED Provider: yes    Patient location:  ED  Previous ECG:     Previous ECG:  Compared to current    Similarity:  No change  Interpretation:     Interpretation: abnormal    Rate:     ECG rate:  95    ECG rate assessment: normal    Rhythm:     Rhythm: sinus rhythm    Ectopy:     Ectopy: PAC    ST segments:     ST segments:  Non-specific  T waves:     T waves: inverted      Inverted:  V4, V5, V6, I and aVL  CriticalCare Time  Performed by: Victor Hugo Davis DO  Authorized by: Victor Hugo Davis DO     Critical care provider statement:     Critical care time (minutes):  75    Critical care time was exclusive of:  Separately billable procedures and treating other patients and teaching time    Critical care was necessary to treat or prevent imminent or life-threatening deterioration of the following conditions:  Cardiac failure, circulatory failure and shock    Critical care was time spent personally by me on the following activities:  Blood draw for specimens, obtaining history from patient or surrogate, development of treatment plan with patient or surrogate, discussions with consultants, evaluation of patient's response to treatment, examination of patient, review of old charts, re-evaluation of patient's condition, ordering and review of radiographic studies, ordering and review of laboratory studies and ordering and performing treatments and interventions             ED Course  ED Course as of 05/20/22 1100   Thu May 19, 2022   1930 hs TnI 0hr(!): 67  139 twelve days ago   1930 NT-proBNP(!): 5,497  5390 twelve days ago   1940 D/w Dr Desiree Garcia, rusty regarding pt  Given recent hx, unclear if trop is just chronic or due to another nstemi  EKG concerning, but nothing to activate cath lab for (no ST elevations, no ongoing pain)  Agrees w/ management at this time (heparin - vte protocol until PE study read)  Plan will be if delta trop elevated more than just a small amt will likely need transfer to [facility] for cards to eval for cabg vs complex pci  2010 Transient episodes of low bp - pt asymptomatic  Per pt and daughter (spoke to her on the phone), pts BP frequently low - that is what prompted her admission to [facility]  Will continue to monitor   2106 hs TnI 2hr(!): 482  PE study being read now  Will d/w Dr Desiree Garcia, cards, once PE study results back   2128 D/w cards - recommends transfer to [facility] for more advanced cards  D/w pt - agreeable to transfer   2138 D/w pharmacy - just cancel the VTE heparin and re-order as ACS heparin   2203 No beds at [facility] - will hold in ED until able to transfer  Will consult SLIM for medical management  Made both SLIM and Cards aware  2230 Care transferred to Dr Saint Nordmann  Pt is awaiting [facility] bed  Both SLIM and Cards here have been consulted for continued management while patient is awaiting transfer  Dr Saint Nordmann aware  SBIRT 20yo+    Flowsheet Row Most Recent Value   SBIRT (25 yo +)    In order to provide better care to our patients, we are screening all of our patients for alcohol and drug use  Would it be okay to ask you these screening questions?  Unable to answer at this time Filed at: 05/19/2022 1814        JOELLE Risk Score    Flowsheet Row Most Recent Value   Age >= 65 1 Filed at: 05/19/2022 2214   Known CAD (stenosis >= 50%) 1 Filed at: 05/19/2022 2214   Recent (<=24 hrs) Service Angina 1 Filed at: 05/19/2022 2214   ST Deviation >= 0 5 mm 0 Filed at: 05/19/2022 2214   3+ CAD Risk Factors (FHx, HTN, HLP, DM, Smoker) 1 Filed at: 05/19/2022 2214   Aspirin Use Past 7 Days 1 Filed at: 05/19/2022 2214   Elevated Cardiac Markers 1 Filed at: 05/19/2022 2214   JOELLE Risk Score (Calculated) 6 Filed at: 05/19/2022 2214        Felix Lai Criteria for PE    Flowsheet Row Most Recent Value   Ashli Criteria for PE    Clinical signs and symptoms of DVT 3 Filed at: 05/19/2022 1902   PE is primary diagnosis or equally likely 0 Filed at: 05/19/2022 1902   HR >100 0 Filed at: 05/19/2022 1902   Immobilization at least 3 days or Surgery in the previous 4 weeks 1 5 Filed at: 05/19/2022 1902   Previous, objectively diagnosed PE or DVT 1 5 Filed at: 05/19/2022 1902   Hemoptysis 0 Filed at: 05/19/2022 1902   Malignancy with treatment within 6 months or palliative 0 Filed at: 05/19/2022 1902   Ashli Criteria Total 6 Filed at: 05/19/2022 1902                MDM  Number of Diagnoses or Management Options  CAD (coronary artery disease): new and requires workup  Elevated brain natriuretic peptide (BNP) level: new and requires workup  NSTEMI (non-ST elevated myocardial infarction) [facility]): new and requires workup  Transient hypotension: new and requires workup  Diagnosis management comments: Pt w/ complex recent hx - very poor historian so will require a significant amount of chart review  Here for cp w/ known cad   ekg w/ no st elevation and pt pain free  Will get labs, cxr  Already received asa/ntg en route   Will d/x cards       Amount and/or Complexity of Data Reviewed  Clinical lab tests: reviewed and ordered  Tests in the radiology section of CPT®: reviewed and ordered  Tests in the medicine section of CPT®: ordered and reviewed  Decide to obtain previous medical records or to obtain history from someone other than the patient: yes  Obtain history from someone other than the patient: yes  Discuss the patient with other providers: yes  Independent visualization of images, tracings, or specimens: yes    Patient Progress  Patient progress: stable      Disposition  Final diagnoses:   CAD (coronary artery disease)   NSTEMI (non-ST elevated myocardial infarction) ([facility] 75 )   Transient hypotension   Elevated brain natriuretic peptide (BNP) level     Time reflects when diagnosis was documented in both MDM as applicable and the Disposition within this note     Time User Action Codes Description Comment    5/19/2022  7:50 PM Lazansky, Fely L Add [I25 10] CAD (coronary artery disease)     5/19/2022  9:44 PM Lazansky, Fely L Add [I21 4] NSTEMI (non-ST elevated myocardial infarction) ([place] [id number] )     5/19/2022  9:44 PM Lazansky, Fely L Modify [I25 10] CAD (coronary artery disease)     5/19/2022  9:44 PM Lazansky, Fely L Modify [I21 4] NSTEMI (non-ST elevated myocardial infarction) ([place] [id number] )     5/20/2022 10:57 AM Lazansky, Fely L Add [I95 9] Transient hypotension     5/20/2022 10:58 AM Lazansky, Fely L Add [R79 89] Elevated brain natriuretic peptide (BNP) level       ED Disposition     ED Disposition   Transfer to Another Facility-In Network    Condition   --    Date/Time   Thu May 19, 2022 10:03 PM    Comment   Reynaldo Blood should be transferred out to [facility], Dr Aline Hanna, accepted 5           MD Documentation    Jeanell Spatz Most Recent Value   Patient Condition The patient has been stabilized such that within reasonable medical probability, no material deterioration of the patient condition or the condition of the unborn child(gagan) is likely to result from the transfer   Reason for Transfer Level of Care needed not available at this facility, No bed available at level of patient's needs   Benefits of Transfer Specialized equipment and/or services available at the receiving facility (Include comment)________________________, Continuity of care   Risks of Transfer Potential for delay in receiving treatment, Increased discomfort during transfer, Potential deterioration of medical condition   Provider Certification General risk, such as traffic hazards, adverse weather conditions, rough terrain or turbulence, possible failure of equipment (including vehicle or aircraft), or consequences of actions of persons outside the control of the transport personnel, Risk of worsening condition      Follow-up Information    None         Patient's Medications   Discharge Prescriptions    No medications on file       No discharge procedures on file      PDMP Review     None          ED Provider  Electronically Signed by           Binta Shannon DO  05/20/22 1100

## 2022-05-20 ENCOUNTER — HOSPITAL ENCOUNTER (INPATIENT)
Facility: HOSPITAL | Age: 72
LOS: 6 days | Discharge: HOME | DRG: 246 | End: 2022-05-26
Attending: INTERNAL MEDICINE | Admitting: INTERNAL MEDICINE
Payer: COMMERCIAL

## 2022-05-20 VITALS
WEIGHT: 193.56 LBS | BODY MASS INDEX: 34.29 KG/M2 | DIASTOLIC BLOOD PRESSURE: 78 MMHG | TEMPERATURE: 98.4 F | SYSTOLIC BLOOD PRESSURE: 122 MMHG | OXYGEN SATURATION: 94 % | HEART RATE: 94 BPM | RESPIRATION RATE: 16 BRPM

## 2022-05-20 DIAGNOSIS — I25.10 CAD (CORONARY ARTERY DISEASE): Primary | ICD-10-CM

## 2022-05-20 DIAGNOSIS — I21.4 NSTEMI (NON-ST ELEVATED MYOCARDIAL INFARCTION) (HCC): ICD-10-CM

## 2022-05-20 DIAGNOSIS — I25.9 CHEST PAIN DUE TO MYOCARDIAL ISCHEMIA, UNSPECIFIED ISCHEMIC CHEST PAIN TYPE: ICD-10-CM

## 2022-05-20 DIAGNOSIS — E11.9 TYPE 2 DIABETES MELLITUS WITHOUT COMPLICATION, WITHOUT LONG-TERM CURRENT USE OF INSULIN (HCC): ICD-10-CM

## 2022-05-20 DIAGNOSIS — R06.00 DYSPNEA ON EXERTION: ICD-10-CM

## 2022-05-20 PROBLEM — R91.1 LUNG NODULE: Status: ACTIVE | Noted: 2022-05-20

## 2022-05-20 LAB
ANION GAP SERPL CALCULATED.3IONS-SCNC: 11 MMOL/L (ref 4–13)
APTT PPP: 46 SECONDS (ref 23–37)
APTT PPP: 52 SECONDS (ref 23–37)
APTT PPP: 71 SECONDS (ref 23–37)
APTT PPP: 82 SECONDS (ref 23–37)
ATRIAL RATE: 277 BPM
ATRIAL RATE: 85 BPM
ATRIAL RATE: 86 BPM
ATRIAL RATE: 98 BPM
ATRIAL RATE: 99 BPM
BASOPHILS # BLD AUTO: 0.07 THOUSANDS/ΜL (ref 0–0.1)
BASOPHILS NFR BLD AUTO: 1 % (ref 0–1)
BUN SERPL-MCNC: 6 MG/DL (ref 5–25)
CALCIUM SERPL-MCNC: 8.5 MG/DL (ref 8.3–10.1)
CARDIAC TROPONIN I PNL SERPL HS: 3768 NG/L (ref 8–18)
CHLORIDE SERPL-SCNC: 107 MMOL/L (ref 100–108)
CO2 SERPL-SCNC: 24 MMOL/L (ref 21–32)
CREAT SERPL-MCNC: 0.9 MG/DL (ref 0.6–1.3)
EOSINOPHIL # BLD AUTO: 0.09 THOUSAND/ΜL (ref 0–0.61)
EOSINOPHIL NFR BLD AUTO: 1 % (ref 0–6)
ERYTHROCYTE [DISTWIDTH] IN BLOOD BY AUTOMATED COUNT: 14.6 % (ref 11.6–15.1)
FLUAV RNA RESP QL NAA+PROBE: NEGATIVE
FLUBV RNA RESP QL NAA+PROBE: NEGATIVE
GFR SERPL CREATININE-BSD FRML MDRD: 63 ML/MIN/1.73SQ M
GLUCOSE SERPL-MCNC: 127 MG/DL (ref 65–140)
GLUCOSE SERPL-MCNC: 130 MG/DL (ref 65–140)
GLUCOSE SERPL-MCNC: 131 MG/DL (ref 65–140)
GLUCOSE SERPL-MCNC: 138 MG/DL (ref 65–140)
HCT VFR BLD AUTO: 38.9 % (ref 34.8–46.1)
HGB BLD-MCNC: 12.2 G/DL (ref 11.5–15.4)
IMM GRANULOCYTES # BLD AUTO: 0.04 THOUSAND/UL (ref 0–0.2)
IMM GRANULOCYTES NFR BLD AUTO: 1 % (ref 0–2)
LYMPHOCYTES # BLD AUTO: 3.11 THOUSANDS/ΜL (ref 0.6–4.47)
LYMPHOCYTES NFR BLD AUTO: 36 % (ref 14–44)
MCH RBC QN AUTO: 29.5 PG (ref 26.8–34.3)
MCHC RBC AUTO-ENTMCNC: 31.4 G/DL (ref 31.4–37.4)
MCV RBC AUTO: 94 FL (ref 82–98)
MONOCYTES # BLD AUTO: 0.87 THOUSAND/ΜL (ref 0.17–1.22)
MONOCYTES NFR BLD AUTO: 10 % (ref 4–12)
NEUTROPHILS # BLD AUTO: 4.51 THOUSANDS/ΜL (ref 1.85–7.62)
NEUTS SEG NFR BLD AUTO: 51 % (ref 43–75)
NRBC BLD AUTO-RTO: 0 /100 WBCS
P AXIS: 65 DEGREES
P AXIS: 66 DEGREES
P AXIS: 71 DEGREES
P AXIS: 81 DEGREES
PLATELET # BLD AUTO: 204 THOUSANDS/UL (ref 149–390)
PMV BLD AUTO: 10.6 FL (ref 8.9–12.7)
POTASSIUM SERPL-SCNC: 3.4 MMOL/L (ref 3.5–5.3)
PR INTERVAL: 144 MS
PR INTERVAL: 146 MS
PR INTERVAL: 170 MS
PR INTERVAL: 176 MS
QRS AXIS: 23 DEGREES
QRS AXIS: 28 DEGREES
QRS AXIS: 32 DEGREES
QRS AXIS: 35 DEGREES
QRS AXIS: 48 DEGREES
QRSD INTERVAL: 76 MS
QRSD INTERVAL: 78 MS
QRSD INTERVAL: 80 MS
QT INTERVAL: 392 MS
QT INTERVAL: 412 MS
QT INTERVAL: 418 MS
QT INTERVAL: 420 MS
QT INTERVAL: 420 MS
QTC INTERVAL: 497 MS
QTC INTERVAL: 502 MS
QTC INTERVAL: 503 MS
QTC INTERVAL: 517 MS
QTC INTERVAL: 519 MS
RBC # BLD AUTO: 4.14 MILLION/UL (ref 3.81–5.12)
RSV RNA RESP QL NAA+PROBE: NEGATIVE
SARS-COV-2 RNA RESP QL NAA+PROBE: NEGATIVE
SODIUM SERPL-SCNC: 142 MMOL/L (ref 136–145)
T WAVE AXIS: 167 DEGREES
T WAVE AXIS: 194 DEGREES
T WAVE AXIS: 196 DEGREES
T WAVE AXIS: 221 DEGREES
T WAVE AXIS: 235 DEGREES
VENTRICULAR RATE: 85 BPM
VENTRICULAR RATE: 86 BPM
VENTRICULAR RATE: 92 BPM
VENTRICULAR RATE: 95 BPM
VENTRICULAR RATE: 99 BPM
WBC # BLD AUTO: 8.69 THOUSAND/UL (ref 4.31–10.16)

## 2022-05-20 PROCEDURE — 84484 ASSAY OF TROPONIN QUANT: CPT | Performed by: PHYSICIAN ASSISTANT

## 2022-05-20 PROCEDURE — 82948 REAGENT STRIP/BLOOD GLUCOSE: CPT

## 2022-05-20 PROCEDURE — 85730 THROMBOPLASTIN TIME PARTIAL: CPT | Performed by: INTERNAL MEDICINE

## 2022-05-20 PROCEDURE — 0241U HB NFCT DS VIR RESP RNA 4 TRGT: CPT | Performed by: STUDENT IN AN ORGANIZED HEALTH CARE EDUCATION/TRAINING PROGRAM

## 2022-05-20 PROCEDURE — 36415 COLL VENOUS BLD VENIPUNCTURE: CPT | Performed by: INTERNAL MEDICINE

## 2022-05-20 PROCEDURE — 99223 1ST HOSP IP/OBS HIGH 75: CPT | Performed by: INTERNAL MEDICINE

## 2022-05-20 PROCEDURE — 93010 ELECTROCARDIOGRAM REPORT: CPT | Performed by: INTERNAL MEDICINE

## 2022-05-20 PROCEDURE — 99215 OFFICE O/P EST HI 40 MIN: CPT | Performed by: PHYSICIAN ASSISTANT

## 2022-05-20 PROCEDURE — 85730 THROMBOPLASTIN TIME PARTIAL: CPT | Performed by: STUDENT IN AN ORGANIZED HEALTH CARE EDUCATION/TRAINING PROGRAM

## 2022-05-20 PROCEDURE — 93005 ELECTROCARDIOGRAM TRACING: CPT

## 2022-05-20 PROCEDURE — 96376 TX/PRO/DX INJ SAME DRUG ADON: CPT

## 2022-05-20 PROCEDURE — 80048 BASIC METABOLIC PNL TOTAL CA: CPT | Performed by: PHYSICIAN ASSISTANT

## 2022-05-20 PROCEDURE — 85025 COMPLETE CBC W/AUTO DIFF WBC: CPT | Performed by: PHYSICIAN ASSISTANT

## 2022-05-20 PROCEDURE — 96366 THER/PROPH/DIAG IV INF ADDON: CPT

## 2022-05-20 RX ORDER — FUROSEMIDE 10 MG/ML
40 INJECTION INTRAMUSCULAR; INTRAVENOUS DAILY
Status: DISCONTINUED | OUTPATIENT
Start: 2022-05-20 | End: 2022-05-21

## 2022-05-20 RX ORDER — LANOLIN ALCOHOL/MO/W.PET/CERES
3 CREAM (GRAM) TOPICAL
Status: DISCONTINUED | OUTPATIENT
Start: 2022-05-20 | End: 2022-05-26 | Stop reason: HOSPADM

## 2022-05-20 RX ORDER — INSULIN LISPRO 100 [IU]/ML
1-5 INJECTION, SOLUTION INTRAVENOUS; SUBCUTANEOUS
Status: DISCONTINUED | OUTPATIENT
Start: 2022-05-20 | End: 2022-05-26 | Stop reason: HOSPADM

## 2022-05-20 RX ORDER — FLUTICASONE FUROATE AND VILANTEROL 100; 25 UG/1; UG/1
1 POWDER RESPIRATORY (INHALATION) DAILY
Status: DISCONTINUED | OUTPATIENT
Start: 2022-05-21 | End: 2022-05-26 | Stop reason: HOSPADM

## 2022-05-20 RX ORDER — FUROSEMIDE 40 MG/1
40 TABLET ORAL DAILY
Status: DISCONTINUED | OUTPATIENT
Start: 2022-05-21 | End: 2022-05-20

## 2022-05-20 RX ORDER — HEPARIN SODIUM 10000 [USP'U]/100ML
3-20 INJECTION, SOLUTION INTRAVENOUS
Status: DISCONTINUED | OUTPATIENT
Start: 2022-05-20 | End: 2022-05-24

## 2022-05-20 RX ORDER — ATORVASTATIN CALCIUM 80 MG/1
80 TABLET, FILM COATED ORAL
Status: DISCONTINUED | OUTPATIENT
Start: 2022-05-21 | End: 2022-05-20 | Stop reason: HOSPADM

## 2022-05-20 RX ORDER — ATORVASTATIN CALCIUM 40 MG/1
40 TABLET, FILM COATED ORAL
Status: DISCONTINUED | OUTPATIENT
Start: 2022-05-20 | End: 2022-05-26 | Stop reason: HOSPADM

## 2022-05-20 RX ORDER — PANTOPRAZOLE SODIUM 40 MG/1
40 TABLET, DELAYED RELEASE ORAL
Status: DISCONTINUED | OUTPATIENT
Start: 2022-05-21 | End: 2022-05-26 | Stop reason: HOSPADM

## 2022-05-20 RX ORDER — POTASSIUM CHLORIDE 20 MEQ/1
40 TABLET, EXTENDED RELEASE ORAL ONCE
Status: COMPLETED | OUTPATIENT
Start: 2022-05-20 | End: 2022-05-20

## 2022-05-20 RX ORDER — FUROSEMIDE 10 MG/ML
40 INJECTION INTRAMUSCULAR; INTRAVENOUS ONCE
Status: DISCONTINUED | OUTPATIENT
Start: 2022-05-20 | End: 2022-05-20 | Stop reason: HOSPADM

## 2022-05-20 RX ORDER — NITROGLYCERIN 0.4 MG/1
0.4 TABLET SUBLINGUAL
Status: DISCONTINUED | OUTPATIENT
Start: 2022-05-20 | End: 2022-05-26 | Stop reason: HOSPADM

## 2022-05-20 RX ORDER — GUAIFENESIN 600 MG
600 TABLET, EXTENDED RELEASE 12 HR ORAL EVERY 12 HOURS SCHEDULED
Status: DISCONTINUED | OUTPATIENT
Start: 2022-05-20 | End: 2022-05-21

## 2022-05-20 RX ORDER — ACETAMINOPHEN 325 MG/1
650 TABLET ORAL EVERY 4 HOURS PRN
Status: DISCONTINUED | OUTPATIENT
Start: 2022-05-20 | End: 2022-05-26 | Stop reason: HOSPADM

## 2022-05-20 RX ORDER — METOPROLOL SUCCINATE 25 MG/1
25 TABLET, EXTENDED RELEASE ORAL DAILY
Status: DISCONTINUED | OUTPATIENT
Start: 2022-05-20 | End: 2022-05-20 | Stop reason: HOSPADM

## 2022-05-20 RX ORDER — ASPIRIN 81 MG/1
81 TABLET ORAL DAILY
Status: DISCONTINUED | OUTPATIENT
Start: 2022-05-21 | End: 2022-05-26 | Stop reason: HOSPADM

## 2022-05-20 RX ORDER — ONDANSETRON 2 MG/ML
4 INJECTION INTRAMUSCULAR; INTRAVENOUS EVERY 6 HOURS PRN
Status: DISCONTINUED | OUTPATIENT
Start: 2022-05-20 | End: 2022-05-26 | Stop reason: HOSPADM

## 2022-05-20 RX ORDER — BENZONATATE 100 MG/1
100 CAPSULE ORAL 3 TIMES DAILY PRN
Status: DISCONTINUED | OUTPATIENT
Start: 2022-05-20 | End: 2022-05-21

## 2022-05-20 RX ADMIN — ASPIRIN 81 MG: 81 TABLET, COATED ORAL at 09:52

## 2022-05-20 RX ADMIN — FLUTICASONE FUROATE AND VILANTEROL TRIFENATATE 1 PUFF: 100; 25 POWDER RESPIRATORY (INHALATION) at 09:52

## 2022-05-20 RX ADMIN — HEPARIN SODIUM 13.8 UNITS/KG/HR: 10000 INJECTION, SOLUTION INTRAVENOUS at 18:33

## 2022-05-20 RX ADMIN — Medication 3 MG: at 21:27

## 2022-05-20 RX ADMIN — METOPROLOL TARTRATE 25 MG: 25 TABLET, FILM COATED ORAL at 20:35

## 2022-05-20 RX ADMIN — GUAIFENESIN 600 MG: 600 TABLET, EXTENDED RELEASE ORAL at 20:35

## 2022-05-20 RX ADMIN — FUROSEMIDE 40 MG: 10 INJECTION, SOLUTION INTRAMUSCULAR; INTRAVENOUS at 17:55

## 2022-05-20 RX ADMIN — ATORVASTATIN CALCIUM 40 MG: 40 TABLET, FILM COATED ORAL at 17:56

## 2022-05-20 RX ADMIN — HEPARIN SODIUM 2000 UNITS: 1000 INJECTION INTRAVENOUS; SUBCUTANEOUS at 10:47

## 2022-05-20 RX ADMIN — ATORVASTATIN CALCIUM 10 MG: 10 TABLET, FILM COATED ORAL at 16:23

## 2022-05-20 RX ADMIN — PANTOPRAZOLE SODIUM 40 MG: 40 TABLET, DELAYED RELEASE ORAL at 05:06

## 2022-05-20 RX ADMIN — POTASSIUM CHLORIDE 40 MEQ: 1500 TABLET, EXTENDED RELEASE ORAL at 18:01

## 2022-05-20 NOTE — ASSESSMENT & PLAN NOTE
· Patient presented the emergency room with complaint of chest pain  · History of known severe triple-vessel disease on cardiac catheterization in April  · Patient was evaluated for possible surgical revascularization-appears patient was deemed not a candidate for open-heart surgery as she was being treated for influenza  · Patient follows with [facility]  · High risk percutaneous intervention was considered but placed on hold given severe peripheral arterial disease  · Troponin 67, repeat 482  · CTA negative for PE  · Cardiology consult  · Monitor on telemetry  · Placed on heparin drip in the ED, continue  · Hold Eliquis while on heparin drip  · Continue aspirin and statin  · Pending transfer to [facility] for CT surgery vs  High risk PCI

## 2022-05-20 NOTE — ASSESSMENT & PLAN NOTE
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

## 2022-05-20 NOTE — CONSULTS
Consult - Cardiology   Mg Paredes 67 y o  female MRN: [id number]  Unit/Bed#: ED 14 Encounter: [id number]        Reason For Consult:  Chest discomfort                  Assessment:  Chest discomfort:  Current symptoms atypical for angina though possibly cardiac versus musculoskeletal d/t coughing  CAD   C 4/15/2022, [facility]:  Multivessel CAD -- 80% ostial RCA, calcified ostial LM stenosis (area 4 1-4 2 centimeter squared by IVUS ), 99% mLAD affecting origin of small D2 (full report below)  Paroxysmal narrow complex tachycardia   Seemingly new diagnosis (example below), poor subjective recognition of same  Cardiomyopathy   Echo 4/15/2022:  EF 45-50 - mild anteroapical dyskinesia without thinning (possibly stress-induced)  Intermittent narrow complex tachycardia  Hx Hypertension:  Currently hypotensive to low normal values (possibly misrepresented due to PAD) - asymptomatic  Dyslipidemia   atorvastatin  PAD    -prior iliac stenting (1980s) - now with recurrent stenosis (no definitive quantification d/t shadowing/artifact on recent imaging)   -Carotid stenosis, left subclavian and proximal right brachiocephalic artery stenosis   -see HPI and imaging results below  Other    Diabetes mellitus    Obesity:  BMI 34    Prior tobacco abuse (quit 1985), COPD    Left lower extremity DVT-10/8/2021, Eliquis anticoagulation    COVID-19 infection requiring intubation 5/2020    Hx CVA, Hx subarachnoid hemorrhage    Hepatitis C      Discussion / Plan:  #  patient presents with complaints of chest discomfort with features somewhat atypical for angina though may be cardiac with some musculoskeletal etiology likely contributing  #   known multivessel CAD (including left main disease) as well as peripheral disease recently hindering coronary angioplasty with further testing intended and as discussed in HPI   #  with some mild auscultatory rales and radiographic signs of pulmonary edema an element of mild heart failure is likely present  #   Note that with signs of proximal upper extremity arterial disease peripheral blood pressure values may not accurate reflects central pressure       Repeat troponin to ensure peak   For now continue IV heparin though if troponin is trending downward and she remains clinically stable this may be able to be discontinued later today or tomorrow ~~> continue to follow with pre-hospital Eliquis being held while on heparin   Will give a single modest dose of IV Lasix following clinical response/symptoms  · Patient currently awaiting bed availability at the [facility] with plans already arrange for her transfer to that [facility] for further testing, possible surgical consultations, consideration of revascularization  · Continue aspirin, statin, and beta-blocker as taken prior to admission  · Continue telemetry for further assessment of narrow complex tachycardia      History Of Present Illness:  Mg Paredes is a 71-year-old patient of Dr Dianne Ledesma (PCP) and Gaby Stoner [facility] cardiology)  Her medical history is as enumerated above noting recent events as follows  Admitted [facility] 4/14-4/22:  Reports of fatigue, dyspnea, and fall with Dx Influenza A pneumonia, COPD exacerbation, and signs of modest rhabdomyolysis  Some signs of congestive heart failure following fluid resuscitation improved with stoppage in IV diuretic    * Echocardiogram 4/15:  LVEF 45-50% with mild dyskinesia of the anteroapical segment without thinning suggestive of possible stress cardiomyopathy    * Catheterization 4/15/2022:  Multivessel CAD -- 80% ostial RCA, calcified ostial left main stenosis (area 4 1-4 2 centimeter squared by IVUS ), 99% mLAD affecting origin of small D2 (full report below)  Options for percutaneous intervention of CAD would be complicated by the patient's severe peripheral arterial disease disease    As such ultimate decision regarding CABG, PCI, or medical therapy were deferred and to be addressed in the outpatient setting after the patient allowed full recovery from her acute illness  With further regard to the patient's peripheral arterial disease, recent outpatient cardiology note indicates history of lower extremity standing occurring in the 1980s  Recent CT scan C/A/P revealed occluded ostial/proximal left subclavian artery, heavily calcified proximal right brachiocephalic artery (which was also noted to be difficult to traverse during cardiac catheterization), and significant bilateral carotid disease  There also appeared to be significant narrowing of the iliacs at the level of prior stenting (image margaret Campos limited definitive assessment of degree of stenosis)  On catheterization patient also noted to have a 40-50 mm pressure gradient on pullback across the right innominate artery  Left arm blood pressure measurements also noted to demonstrate 60 mm gradient from central aortic pressures  Duplex study reported conflicting results suggesting patent bilateral subclavian arteries as well as patent iliofemoral arteries yet there is mention of bidirectional flow in the right subclavian suggesting more proximal stenosis    4/25/2022 O/p cardiology visit [facility], Dr Ifrah Lackey)  Stable dyspnea similar to post COVID baseline though somewhat less active and not yet back to work in the aftermath of her than recent hospitalization  No sign/symptom of heart failure and denial of any chest pain  A/P:  Because of conflicting reports regarding the patient's arterial duplex studies and catheterization lab pressure data impacting options for future catheter based treatment of the patient's coronary disease CTA of the neck and abdominal aorta with iliofemoral runoff was ordered  It was felt that if there would be adequate vessel diameter to deliver equipment from the groin it might allow for safe completion of coronary angioplasty    Further if there was no evidence of significant proximal left subclavian stenosis (which appeared to be occluded on recent CT scan but with normal waveforms) then coronary artery bypass grafting may be an available option, additionally noting venous duplex studies showed normal vein diameter which may serve is adequate conduit for bypass grafting  As mentioned the patient had some mild stable dyspnea somewhat worse in the aftermath of than recent hospitalization with ongoing cough  And while coronary insufficiency may have been contributing to her symptoms it was noted that she had suffered some functional regression in the aftermath of COVID infection with recent influenza infection contributing  It was decided more time would be allowed for recovery with the patient to return to the office in late July for reassessment and review of testing  Patient presently tells me that in the last few days she has had some increased cough without expectoration  Yesterday while it a restaurant she had some discomfort across the anterior chest without other radiation  She later went home which she continued to be bothered by this discomfort which he described as an achy sensation near constant and seemingly worsened by cough or deep inspiration  She thought she would lay down but was unrelieved with continued discomfort and cough  Because of ongoing symptoms she went to be evaluated so she called an ambulance and was brought to the [facility] Emergency Department yesterday, 5/19/2022  Since arrival high sensitivity troponin levels are 67, 482 common 732  NT proBNP is 5497  Patient presented with 93% room air saturation  Serial ECG showed nonspecific T-wave inversion without ST segment shift or dynamic change and are similar to prior tracing  Personal review of telemetry shows single event of narrow complex tachycardia - full disclosure not available for better evaluation    CT scan the chest negative for pulmonary embolism with signs of vascular congestion/pulmonary edema  In ED the patient was initiated on a heparin drip      Past Medical History:        Past Medical History:   Diagnosis Date    Acid reflux     Carotid artery disease ([address] )     Diabetes mellitus (HCC)     NIDDM    Edema     bles    Full dentures     History of hepatitis C     History of shingles     healed    Hx of renal calculi     Hyperlipidemia     Hypertension     Kidney stone     Liver disease     Nocturnal leg cramps     OA (osteoarthritis) of knee     left    PVD (peripheral vascular disease) ([address]     S/P insertion of iliac artery stent     miles    Urinary incontinence     Wears glasses       Past Surgical History:   Procedure Laterality Date    APPENDECTOMY      COLONOSCOPY      ESOPHAGEAL DILATION      ESOPHAGOGASTRODUODENOSCOPY      ILIAC ARTERY STENT Bilateral     KIDNEY STONE SURGERY      NC TOTAL KNEE ARTHROPLASTY Left 3/7/2017    Procedure: ARTHROPLASTY KNEE TOTAL;  Surgeon: Epifanio Parrish MD;  Location: [facility];  Service: Orthopedics        Allergy:        Allergies   Allergen Reactions    Codeine GI Intolerance    Compazine [Prochlorperazine] Other (See Comments)     Eyes rolled up in head    Tramadol GI Intolerance       Medications:       Prior to Admission medications    Medication Sig Start Date End Date Taking? Authorizing Provider   apixaban (Eliquis) 5 mg Take 5 mg by mouth in the morning and 5 mg in the evening  Yes Historical Provider, MD   aspirin (ECOTRIN LOW STRENGTH) 81 mg EC tablet Take 1 tablet (81 mg total) by mouth daily 4/30/20  Yes NATALIA Noel   atorvastatin (LIPITOR) 10 mg tablet Lipitor 10 MG Oral Tablet   Refills: 0    Active- 1 tab daily   Yes Historical Provider, MD   benzonatate (TESSALON PERLES) 100 mg capsule Take 100 mg by mouth as needed in the morning and 100 mg as needed at noon and 100 mg as needed in the evening for cough     Yes Historical Provider, MD   cyclobenzaprine (FLEXERIL) 10 mg tablet Cyclobenzaprine HCl - 10 MG Oral Tablet   Refills: 0    Active- 1 tab q hs prn - pt reporting taking 0 5 tablet if needed   Yes Historical Provider, MD   fluticasone-umeclidinium-vilanterol (Trelegy Ellipta) 100-62 5-25 MCG/INH inhaler Inhale 1 puff daily Rinse mouth after use  Yes Historical Provider, MD   furosemide (Lasix) 40 mg tablet Take 1 tablet (40 mg total) by mouth daily  Patient taking differently: Take 20 mg by mouth in the morning   5/10/22 6/9/22 Yes Lyndsay Gilmore, DO   lansoprazole (PREVACID) 30 mg capsule Take 30 mg by mouth daily   Yes Historical Provider, MD   meclizine (ANTIVERT) 25 mg tablet Take 25 mg by mouth daily  4/24/20  Yes Historical Provider, MD   Melatonin 5 MG TABS Take 5 mg by mouth 6/5/20  Yes Historical Provider, MD   metFORMIN (GLUCOPHAGE) 850 mg tablet Take 850 mg by mouth 2 (two) times a day with meals 3/26/21  Yes Historical Provider, MD   metoprolol tartrate (LOPRESSOR) 25 mg tablet Take 25 mg by mouth every 12 (twelve) hours   Yes Historical Provider, MD   fluticasone (FLONASE) 50 mcg/act nasal spray Fluticasone Propionate 50 MCG/ACT Nasal Suspension   Refills: 0    Active- 2 sprays each nostril daily prn  Patient not taking: Reported on 5/19/2022    Historical Provider, MD   meloxicam (MOBIC) 7 5 mg tablet Take 7 5 mg by mouth daily  Patient not taking: Reported on 5/19/2022 4/3/20   Historical Provider, MD   montelukast (SINGULAIR) 10 mg tablet Take 10 mg by mouth 7/7/20 3/29/21  Historical Provider, MD   oxyCODONE (ROXICODONE) 5 mg immediate release tablet Take 1-2 tablets by mouth every 4 (four) hours as needed for moderate pain Max Daily Amount: 60 mg  Patient not taking: No sig reported 8/6/45   Juanjose Villar MD   RA COUGH DM 30 MG/5ML SUER take 10 milliliters by mouth twice a day  Patient not taking: Reported on 5/19/2022 4/26/20   Historical Provider, MD       Family History:     Family History   Family history unknown: Yes        Social History:       Social History     Socioeconomic History    Marital status: Single     Spouse name: None    Number of children: None    Years of education: None    Highest education level: None   Occupational History    None   Tobacco Use    Smoking status: Former Smoker     Quit date: 2001     Years since quittin 4    Smokeless tobacco: Never Used   Vaping Use    Vaping Use: Never used   Substance and Sexual Activity    Alcohol use: Never    Drug use: No    Sexual activity: None   Other Topics Concern    None   Social History Narrative    None     Social Determinants of Health     Financial Resource Strain: Not on file   Food Insecurity: No Food Insecurity    Worried About Running Out of Food in the Last Year: Never true    Gila of Food in the Last Year: Never true   Transportation Needs: No Transportation Needs    Lack of Transportation (Medical): No    Lack of Transportation (Non-Medical): No   Physical Activity: Not on file   Stress: Not on file   Social Connections: Not on file   Intimate Partner Violence: Not on file   Housing Stability: Low Risk     Unable to Pay for Housing in the Last Year: No    Number of Places Lived in the Last Year: 1    Unstable Housing in the Last Year: No       ROS:  Symptoms per HPI  Rare lower extremity claudication  The remainder of the review of systems is negative    Exam:  General:  Alert, comfortable appearing, somewhat challenging historian at times quite tangential in conversation  Head: Normocephalic, atraumatic  Eyes:  EOMI  Pupils - equal, round, reactive to accomodation  No icterus  Normal Conjunctiva  Oropharynx: Moist without lesion  Neck:  No  JVD, thyromegaly, or lymphadenopathy  Heart:  Regular with controlled rate  No rub nor pathologic murmur  Lungs:  Mild bibasilar rales with some dulling at the right base  Abdomen:  Soft and nontender with normal bowel sounds   No organomegaly or mass  Lower Limbs:  No edema  Pulses[de-identified]  RLE - DP:  1+ LLE - DP:  1+  Musculoskeletal: Independent movement of limbs observed, Formal ROM and strength eval not performed  Neurologic:    Oriented to: person, place, situation  Cranial Nerves: grossly intact - vision, smell, taste, and hearing were not tested  Motor function: grossly normal, symmetric   Sensation: Was not tested      Vitals:    05/19/22 2224 05/19/22 2230 05/20/22 0455 05/20/22 0750   BP: 116/83 116/83 119/60 108/67   BP Location: Right arm  Right arm Right arm   Pulse: 76 84 94 87   Resp:  (!) 24 22 22   Temp:   98 4 °F (36 9 °C)    TempSrc:   Oral    SpO2: 94% 96% 93% 93%   Weight:               DATA:      -----------    ECG:                    -----------------------------------------------------------------------------------------------------------------------------------------------  Telemetry:              -----------------------------------------------------------------------------------------------------------------------------------------------  Echocardiogram  4/15/2022, [facility]  Narrative  This result has an attachment that is not available    Left Ventricle: Left ventricle is normal in size  There is mild   hypertrophy  Systolic function is mildly decreased with an ejection   fraction of 45-50%  Anteroapical mild dyskinesis without wall thinning   suggestive of stress cardiomyopathy  There is grade I (mild) diastolic   dysfunction    Left Atrium: Left atrium cavity size is normal      Right Ventricle: Right ventricle cavity is normal      Aortic Valve: The aortic valve is trileaflet    IVC/SVC: The inferior vena cava demonstrates a diameter of <=21 mm and   collapses >50%; therefore, the right atrial pressure is estimated at 0-5   mmHg    Pericardium: There is no pericardial effusion    Tricuspid Valve: There is trace regurgitation  Left Ventricle   Left ventricle is normal in size  There is mild hypertrophy   Systolic function is mildly decreased with an ejection fraction of 45-50%  Anteroapical mild dyskinesis without wall thinning suggestive of stress cardiomyopathy  There is grade I (mild) diastolic dysfunction  Right Ventricle   Right ventricle cavity is normal  Systolic function is normal      Left Atrium   Left atrium cavity size is normal      Right Atrium   Right atrium cavity is normal      IVC/SVC   The inferior vena cava demonstrates a diameter of <=21 mm and collapses >50%; therefore, the right atrial pressure is estimated at 0-5 mmHg  Mitral Valve   Mitral valve structure is normal  There is no regurgitation or stenosis  Tricuspid Valve   Tricuspid valve structure is normal  There is trace regurgitation  There is no evidence of tricuspid valve stenosis  Aortic Valve   The aortic valve is trileaflet  There is no regurgitation or stenosis  Pulmonic Valve   Pulmonic valve structure is normal  There is no regurgitation or stenosis  Ascending Aorta   The aorta appears normal in size  Pericardium   There is no pericardial effusion  Study Details   A complete 2D echocardiogram was performed  Color flow, Pulse Wave and Continuous Wave Doppler was performed and analyzed  Overall the study quality was adequate     -----------------------------------------------------------------------------------------------------------------------------------------------------------------------------------  Ischemic Testing:  Catheterization - 4/20/2022, [facility]  Narrative  This result has an attachment that is not available  · Severe calcific ostial RCA stenosis (80%); Severe calcific ostial LMCA   stenosis with area of 4 1-4 2cm2 by IVUS assessment; severe mid LAD   stenosis (99%) involving takeoff of a small caliber second diagonal   branch  · Moderately elevated LV filling pressures  · Mild pullback gradient across aortic valve     · High grade heavily calcified stenosis of proximal right innominate   artery with a gradient of 40-50 mmHg across lesion  · Observed invasive ascending aortic pressure to left cuff pressure   differential of ~60 mmHg  · Procedures performed: moderate sedation administration (63 minutes);   LHC; coronary angiography; peripheral angiography of right upper   extremity; IVUS of LMCA/LCX   · Modifier-22: increased time and case difficulty due to high grade   stenosis of neck vessels challenging engagement of the ascending aorta and   limiting maneuverablility of diagnostic catheters adding >30 minutes to   expected case length  Coronary Findings Diagnostic Dominance: Right    - Left Main: Ost LM lesion is 65% stenosed  Culprit lesion  Lesion length: discrete (<10mm)  JOELLE flow is 3  The lesion is discrete  The lesion is severely calcified  Focal areas of calcium around circumference of vessel (>270 degrees)  Ultrasound (IVUS) was performed  IVUS has determined that the lesion is calcified and concentric  Measured area of 4 1-4 2cm2 Significant dampening of pressure waveforms with engagement with 5F diagnostic catheter  - Left Anterior Descending: The vessel is tortuous  Prox LAD to Mid LAD lesion is 99% stenosed  Culprit lesion  Lesion length: discrete (<10mm)  JOELLE flow is 3  The lesion is discrete  The lesion is moderately calcified  First Diagonal Branch: 1st Diag lesion is 55% stenosed  Not the culprit lesion  Lesion length: discrete (<10mm)  JOELLE flow is 3  The lesion is discrete  The lesion is mildly calcified     - Ramus Intermedius: The vessel is small     - Left Circumflex: Ost Cx lesion is 50% stenosed  Not the culprit lesion  Lesion length: discrete (<10mm)  JOELLE flow is 3  The lesion is discrete  The lesion is calcified  Ultrasound (IVUS) was performed  Measured area of >4 0cm2    - Right Coronary Artery: The vessel is tortuous  Ost RCA to Prox RCA lesion is 80% stenosed  Culprit lesion  Lesion length: tubular (10-20mm)  JOELLE flow is 3  The lesion is tubular  The lesion is severely calcified   Heavy eccentric calcium at vessel ostium  Significant dampening of pressure waveforms with engagement of vessel with 5 Fr catheter  Intervention   No interventions have been documented  Aortic Valve   Mild (5-7 mmHg) pullback gradient across the aortic valve  Access   Right ulnar artery access  The puncture site was infiltrated with a local anesthetic  The vessel was accessed using the modified Seldinger technique and a wire was threaded into the vessel  A 6 Fr long Introducer was advanced over the wire into the vessel  Hemostasis   The sheath was removed  Hemostasis was achieved using a radial compression system  Baseline Hemodynamics   Systemic arterial pressure is normotensive  Left ventricular end diastolic pressure is moderately elevated  CathPCI Indications   The patient's indication(s) for this cath lab visit were acute coronary syndrome for more than 24 hours, suspected CAD, cardiomyopathy and LV dysfunction  CV Recommendation  Multidisciplinary disussion re: percutaneous vs surgical revascularization vs medical management  No intervention at this time with plan to let patient recover from influenza infection  Of note, bilateral significant upper extremity stenoses with significant pressure gradients observed  Review of recent CT-A shows occlusion of the ostial/proximal left subclavian with reconstitution likely via small channel vs bridging collaterals with heavy calcium at ostium  Heavy calcium observed at take off through proximal segment of right innominate vessel  Also with high grade proximal left common carotid stenosis  Bilateral iliac stents appear stenosed with diameters that would make placement of a MCS device from the lower extremities challenging limiting bailout strategies in the case of hemodynamic compromise in the setting of multivessel PCI of high risk coronary anatomy which will likely require calcium modification with either atherectomy of lithotripsy  ------------------------------------------------------------------------------------------------------------------------------------------------------------------------------------  Arterial/venous duplex study   Upper extremity, 4/21/22, [facility]    This result has an attachment that is not available  Ultrasound Study: Arterial Duplex of Bilateral Upper Extremities   Reason for Study: bilateral subclavian artery disease   Technical Quality: adequate     Interpretation:     B-mode two-dimensional vascular structure imaging, Doppler spectral   analysis, and color flow Doppler imaging were performed  Peak systolic   velocities (PSV in cm/sec), and waveform and arterial characteristics are   as described below  RIGHT:   The peak systolic velocity (PSV) in cm/sec of: subclavian artery   proximally is 70, distally is 39  Right vertebral artery is 11 3 and   bidirectional    On B-mode imaging, minimal plaque is seen  LEFT:   The peak systolic velocity (PSV) in cm/sec of: subclavian artery   proximally is 60, distally is 24   Left vertebral artery is 24 5 and   antegrade  B-mode imaging: minimal plaque is seen     Impression:   Patent bilateral subclavian arteries without signs of hemodynamically   significant stenosis based on waveforms  Possible bidirectional flow in right vertebral could signify   hemodynamically significant proximal right subclavian artery stenosis   Antegrade flow in left vertebral artery  Addendum  Addendum by Peter Jaimes MD on 04/21/2022  5:22 PM EDT   Right vertebral artery is actually antegrade flow  -----------------------------------------------------------------------------------------------------------------------  Carotid, 4/21/22, [facility]  Narrative  This result has an attachment that is not available     Ultrasound Study: Carotid Duplex   Reason for Study: CAD   Technical Quality: adequate       Please Note:     1) Measurement of carotid stenosis is based on velocity parameters that   correlate the residual internal carotid diameter with NASCET-based   stenosis levels      2) Stenosis or occlusion may factitiously elevate the blood flow velocity   on the opposite side thereby upgrading a lesser degree of stenosis      3) Calcifications with acoustic shadowing may "mask" a significant   stenosis and another modality such as MRA or CTA may be needed for better   "visualization/evaluation "   4) Presumed occlusion should be verified by a different imaging modality   such as CTA  Interpretation:     B-mode two-dimensional vascular structure imaging, Doppler spectral   analysis, and color flow Doppler imaging were performed  Peak systolic   velocities (PSV in cm/sec), and waveform and arterial characteristics are   as described below  RIGHT:   Doppler spectral analysis: Minimal peak systolic velocity (PSV) for the   common carotid is 30 cm/sec; maximum peak systolic velocity for the   internal carotid is  36 with an end diastolic velocity of  58;  the   external carotid PSV is 62; and the vertebral artery PSV is 13   The   ICA/CCA ratio is normal   On B mode imaging, irregular heterogenous plaque   is seen in proximal ICA        LEFT:   Doppler spectral analysis: Minimal peak systolic velocity (PSV) for the   common carotid is 70 cm/sec; maximum peak systolic velocity for the   internal carotid is  213 with an end diastolic velocity of  33;  the   external carotid PSV is 765; and the vertebral artery PSV is 23   The   ICA/CCA ratio is 3   On B mode imaging, irregular heterogenous plaque is   seen in proximal ICA        Impression:   1  RIGHT internal carotid artery with <50 % stenosis, which is   hemodynamically insignificant  Waveforms are dampened however starting at   the common carotid which could signify more proximal stenosis in the   innominate artery  2  LEFT internal carotid artery with 50-69 % stenosis, which is   hemodynamically significant     3  Bilateral vertebral arteries with normally directed antegrade flow  -----------------------------------------------------------------------------------------------------------------------------------------------    Weights: Wt Readings from Last 20 Encounters:   05/19/22 87 8 kg (193 lb 9 oz)   05/10/22 86 4 kg (190 lb 7 6 oz)   03/29/21 88 9 kg (196 lb)   07/13/20 80 3 kg (177 lb)   03/07/17 85 7 kg (189 lb)   02/23/17 85 7 kg (189 lb)   12/05/16 87 4 kg (192 lb 9 6 oz)   01/07/16 87 7 kg (193 lb 6 1 oz)   10/05/15 87 2 kg (192 lb 4 oz)   , Body mass index is 34 29 kg/m²           Lab Studies:               Results from last 7 days   Lab Units 05/20/22  0453 05/19/22  1821   WBC Thousand/uL 8 69 6 65   HEMOGLOBIN g/dL 12 2 11 8   HEMATOCRIT % 38 9 38 8   PLATELETS Thousands/uL 204 129*   ,   Results from last 7 days   Lab Units 05/20/22  0453 05/19/22  1821   POTASSIUM mmol/L 3 4* 3 3*   CHLORIDE mmol/L 107 107   CO2 mmol/L 24 22   BUN mg/dL 6 7   CREATININE mg/dL 0 90 1 05   CALCIUM mg/dL 8 5 8 9

## 2022-05-20 NOTE — ASSESSMENT & PLAN NOTE
Lab Results   Component Value Date    HGBA1C 6 5 (H) 03/30/2022       Recent Labs     05/19/22  7450   POCGLU 132       Blood Sugar Average: Last 72 hrs:  ·  Hold oral metformin  · Sliding scale insulin coverage with Accu-Cheks

## 2022-05-20 NOTE — H&P
[facility]  H&P- Chanda Mcintosh [birth year], 67 y o  female MRN: [id number]  Unit/Bed#: CW2 213-02 Encounter: [id number]  Primary Care Provider: Carolina Christianson MD   Date and time admitted to hospital: 5/20/2022  5:15 PM    * Chest pain  Assessment & Plan  · Patient presented the emergency room with complaint of chest pain  · History of known severe triple-vessel disease on cardiac catheterization in 4/15/2022  · Options at that time for percutaneous intervention of CAD would be complicated by the patient's severe peripheral arterial disease disease  As such ultimate decision regarding CABG, PCI, or medical therapy were deferred and to be addressed in the outpatient setting after the patient allowed full recovery from her acute illness    · Patient follows with [facility]iology  · Elevated troponin ED  · CTA negative for PE  · Cardiology consulted  · Placed on heparin drip in the ED, continue  · Hold Eliquis while on heparin drip  · Continue aspirin, beta-blocker and statin  · Patient transferred to [facility] for CT surgery vs  High risk PCI      CAD (coronary artery disease)  Assessment & Plan  [facility] 4/15/2022, [facility]:  Multivessel CAD -- 80% ostial RCA, calcified ostial LM stenosis  99% mLAD affecting origin of small D2  Continue with above treatment  Cardio eval    Chronic combined systolic and diastolic heart failure (HCC)  Assessment & Plan  Wt Readings from Last 3 Encounters:   05/19/22 87 8 kg (193 lb 9 oz)   05/10/22 86 4 kg (190 lb 7 6 oz)   03/29/21 88 9 kg (196 lb)     Cardiac echo on 04/15 with EF 45-50%  Chest CT scan with pulmonary edema  · Continue with Lasix, change to IV  · Monitor intake and output with daily weights          Lung nodule  Assessment & Plan  Incidental finding on CT scan  Repeat chest CT scan in 12 months    Hypertension  Assessment & Plan  Continue to monitor    Aortoiliac occlusive disease ([facility] [address] )  Assessment & Plan  · Patient with known aortoiliac occlusive disease with bilateral iliac stents  · Follows outpatient with vascular surgery  · Continue aspirin and statin      Diabetes mellitus [facility])  Assessment & Plan  Lab Results   Component Value Date    HGBA1C 6 5 (H) 03/30/2022       Recent Labs     05/19/22  2258   POCGLU 132       Blood Sugar Average: Last 72 hrs:  ·  Hold oral metformin  · Sliding scale insulin coverage with Accu-Cheks      VTE Pharmacologic Prophylaxis: VTE Score: 4 Moderate Risk (Score 3-4) - Pharmacological DVT Prophylaxis Ordered: heparin drip  Code Status: Level 1 - Full Code     Anticipated Length of Stay: Patient will be admitted on an inpatient basis with an anticipated length of stay of greater than 2 midnights secondary to Management of CAD  Total Time for Visit, including Counseling / Coordination of Care: 60 minutes Greater than 50% of this total time spent on direct patient counseling and coordination of care  Chief Complaint:   Chest pain    History of Present Illness:    Antony Fleming is a 67 y o  female with a PMH of CAD, peripheral arterial disease, hypertension and diabetes mellitus type 2 who presents with chest pain  Patient was Admitted [facility] 4/14-4/22:  Reports of fatigue, dyspnea, and fall with Dx Influenza A pneumonia, COPD exacerbation, and signs of modest rhabdomyolysis  Some signs of congestive heart failure following fluid resuscitation improved with  IV diuretic     Echocardiogram 4/15:  LVEF 45-50% with mild dyskinesia of the anteroapical segment without thinning suggestive of possible stress cardiomyopathy     Catheterization 4/15/2022:  Multivessel CAD -- 80% ostial RCA, calcified ostial left main stenosis, 99% mLAD affecting origin of small D2   Options for percutaneous intervention of CAD would be complicated by the patient's severe peripheral arterial disease disease    As such ultimate decision regarding CABG, PCI, or medical therapy were deferred and to be addressed in the outpatient setting after the patient allowed full recovery from her acute illness  Patient was evaluated in the emergency room found to have elevated troponin and BNP  EKG showed nonspecific T-wave inversion without ST segment shift  She was started on heparin drip  Decision was made to transfer patient to [facility] for complex PCI versus CT surgery evaluation  Currently patient is comfortable in bed  No chest pain however intermittent cough with dyspnea on exertion    Review of Systems:  Review of Systems   Constitutional: Negative for chills and fever  HENT: Negative for sore throat  Respiratory: Positive for cough, chest tightness and shortness of breath  Cardiovascular: Negative for chest pain, palpitations and leg swelling  Gastrointestinal: Negative for abdominal pain, blood in stool, diarrhea, nausea and vomiting  Endocrine: Negative for polyuria  Genitourinary: Negative for difficulty urinating and dysuria  Neurological: Negative for dizziness, speech difficulty and headaches  All other systems reviewed and are negative        Past Medical and Surgical History:   Past Medical History:   Diagnosis Date    Acid reflux     Carotid artery disease ([facility] [address] )     Diabetes mellitus (HCC)     NIDDM    Edema     bles    Full dentures     History of hepatitis C     History of shingles     healed    Hx of renal calculi     Hyperlipidemia     Hypertension     Kidney stone     Liver disease     Nocturnal leg cramps     OA (osteoarthritis) of knee     left    PVD (peripheral vascular disease) ([facility] [address] )     S/P insertion of iliac artery stent     miles    Urinary incontinence     Wears glasses        Past Surgical History:   Procedure Laterality Date    APPENDECTOMY      COLONOSCOPY      ESOPHAGEAL DILATION      ESOPHAGOGASTRODUODENOSCOPY      ILIAC ARTERY STENT Bilateral     KIDNEY STONE SURGERY      HI TOTAL KNEE ARTHROPLASTY Left 3/7/2017    Procedure: ARTHROPLASTY KNEE TOTAL;  Surgeon: Neema Graham MD;  Location: [facility];  Service: Orthopedics       Meds/Allergies:  Prior to Admission medications    Medication Sig Start Date End Date Taking? Authorizing Provider   apixaban (Eliquis) 5 mg Take 5 mg by mouth in the morning and 5 mg in the evening  Historical Provider, MD   aspirin (ECOTRIN LOW STRENGTH) 81 mg EC tablet Take 1 tablet (81 mg total) by mouth daily 4/30/20   NATALIA Noel   atorvastatin (LIPITOR) 10 mg tablet Lipitor 10 MG Oral Tablet   Refills: 0    Active- 1 tab daily    Historical Provider, MD   benzonatate (TESSALON PERLES) 100 mg capsule Take 100 mg by mouth as needed in the morning and 100 mg as needed at noon and 100 mg as needed in the evening for cough  Historical Provider, MD   cyclobenzaprine (FLEXERIL) 10 mg tablet Cyclobenzaprine HCl - 10 MG Oral Tablet   Refills: 0    Active- 1 tab q hs prn - pt reporting taking 0 5 tablet if needed    Historical Provider, MD   fluticasone (FLONASE) 50 mcg/act nasal spray Fluticasone Propionate 50 MCG/ACT Nasal Suspension   Refills: 0    Active- 2 sprays each nostril daily prn  Patient not taking: Reported on 5/19/2022    Historical Provider, MD   fluticasone-umeclidinium-vilanterol (Trelegy Ellipta) 214-56 2-82 MCG/INH inhaler Inhale 1 puff daily Rinse mouth after use  Historical Provider, MD   furosemide (Lasix) 40 mg tablet Take 1 tablet (40 mg total) by mouth daily  Patient taking differently: Take 20 mg by mouth in the morning   5/10/22 6/9/22  Mayra Gilmore, DO   lansoprazole (PREVACID) 30 mg capsule Take 30 mg by mouth daily    Historical Provider, MD   meclizine (ANTIVERT) 25 mg tablet Take 25 mg by mouth daily  4/24/20   Historical Provider, MD   Melatonin 5 MG TABS Take 5 mg by mouth 6/5/20   Historical Provider, MD   meloxicam (MOBIC) 7 5 mg tablet Take 7 5 mg by mouth daily  Patient not taking: Reported on 5/19/2022 4/3/20   Historical Provider, MD   metFORMIN (GLUCOPHAGE) 850 mg tablet Take 850 mg by mouth 2 (two) times a day with meals 3/26/21   Historical Provider, MD   metoprolol tartrate (LOPRESSOR) 25 mg tablet Take 25 mg by mouth every 12 (twelve) hours    Historical Provider, MD   montelukast (SINGULAIR) 10 mg tablet Take 10 mg by mouth 7/7/20 3/29/21  Historical Provider, MD   oxyCODONE (ROXICODONE) 5 mg immediate release tablet Take 1-2 tablets by mouth every 4 (four) hours as needed for moderate pain Max Daily Amount: 60 mg  Patient not taking: No sig reported 55   Neema Graham MD   RA COUGH DM 30 MG/5ML SUER take 10 milliliters by mouth twice a day  Patient not taking: Reported on 2022   Historical Provider, MD     I have reviewed home medications with a medical source (PCP, Pharmacy, other)  Allergies: Allergies   Allergen Reactions    Codeine GI Intolerance    Compazine [Prochlorperazine] Other (See Comments)     Eyes rolled up in head    Tramadol GI Intolerance       Social History:  Marital Status: Single     Substance Use History:   Social History     Substance and Sexual Activity   Alcohol Use Never     Social History     Tobacco Use   Smoking Status Former Smoker    Quit date: 2001    Years since quittin 4   Smokeless Tobacco Never Used     Social History     Substance and Sexual Activity   Drug Use No       Family History:    Family History   Family history unknown: Yes     Physical Exam:     Vitals:        Physical Exam  Vitals reviewed  Constitutional:       Appearance: Normal appearance  She is not ill-appearing  HENT:      Head: Normocephalic and atraumatic  Mouth/Throat:      Mouth: Mucous membranes are moist       Pharynx: No oropharyngeal exudate  Eyes:      Extraocular Movements: Extraocular movements intact  Conjunctiva/sclera: Conjunctivae normal       Pupils: Pupils are equal, round, and reactive to light  Cardiovascular:      Rate and Rhythm: Normal rate and regular rhythm  Pulses: Normal pulses        Heart sounds: Normal heart sounds  No murmur heard  Pulmonary:      Effort: Pulmonary effort is normal       Breath sounds: No wheezing  Comments: Decreased breath sounds bilateral  Abdominal:      General: Bowel sounds are normal  There is no distension  Palpations: Abdomen is soft  Tenderness: There is no abdominal tenderness  Musculoskeletal:         General: Normal range of motion  Cervical back: Normal range of motion and neck supple  Right lower leg: No edema  Left lower leg: No edema  Skin:     General: Skin is warm and dry  Findings: No rash  Neurological:      General: No focal deficit present  Mental Status: She is alert and oriented to person, place, and time  Cranial Nerves: No cranial nerve deficit  Psychiatric:         Mood and Affect: Mood normal             Additional Data:     Lab Results:  Results from last 7 days   Lab Units 05/20/22  0453   WBC Thousand/uL 8 69   HEMOGLOBIN g/dL 12 2   HEMATOCRIT % 38 9   PLATELETS Thousands/uL 204   NEUTROS PCT % 51   LYMPHS PCT % 36   MONOS PCT % 10   EOS PCT % 1     Results from last 7 days   Lab Units 05/20/22  0453   SODIUM mmol/L 142   POTASSIUM mmol/L 3 4*   CHLORIDE mmol/L 107   CO2 mmol/L 24   BUN mg/dL 6   CREATININE mg/dL 0 90   ANION GAP mmol/L 11   CALCIUM mg/dL 8 5   GLUCOSE RANDOM mg/dL 127     Results from last 7 days   Lab Units 05/19/22  1925   INR  0 98     Results from last 7 days   Lab Units 05/20/22  1506 05/19/22  2258   POC GLUCOSE mg/dl 131 132               Imaging:  Reviewed  No orders to display       EKG and Other Studies Reviewed on Admission:   · yes    ** Please Note: This note has been constructed using a voice recognition system   **

## 2022-05-20 NOTE — ED NOTES
Heparin resulted as therapeutic, no change will be made to heparin pump       Kayla Hutchison RN  05/20/22 1173

## 2022-05-20 NOTE — ASSESSMENT & PLAN NOTE
Lab Results   Component Value Date    HGBA1C 6 5 (H) 03/30/2022     · Hold oral metformin  · Sliding scale insulin coverage with Accu-Cheks

## 2022-05-20 NOTE — ED NOTES
Julissa Jacobs RN went to go check on patient at this time- patient's O2 was not in patient's nose at this time; Julissa Jacobs RN placed oxygen back on patient at this time      Celso Zurita RN  05/20/22 7560 [facility] Pregnancy [facility] Lactation Text: The risk during pregnancy and breastfeeding is uncertain with this medication.

## 2022-05-20 NOTE — EMTALA/ACUTE CARE TRANSFER
[facility] [address]  [address] [address]  Dept: [phone]      EMTALA TRANSFER CONSENT    NAME Alma Brewer                                         1950                              MRN [id number]    I have been informed of my rights regarding examination, treatment, and transfer   by Dr Crystal Leonard MD    Benefits:      Risks:        Consent for Transfer:  I acknowledge that my medical condition has been evaluated and explained to me by the emergency department physician or other qualified medical person and/or my attending physician, who has recommended that I be transferred to the service of    at    The above potential benefits of such transfer, the potential risks associated with such transfer, and the probable risks of not being transferred have been explained to me, and I fully understand them  The doctor has explained that, in my case, the benefits of transfer outweigh the risks  I agree to be transferred  I authorize the performance of emergency medical procedures and treatments upon me in both transit and upon arrival at the receiving facility  Additionally, I authorize the release of any and all medical records to the receiving facility and request they be transported with me, if possible  I understand that the safest mode of transportation during a medical emergency is an ambulance and that the Hospital advocates the use of this mode of transport  Risks of traveling to the receiving facility by car, including absence of medical control, life sustaining equipment, such as oxygen, and medical personnel has been explained to me and I fully understand them  (CARI CORRECT BOX BELOW)  [  ]  I consent to the stated transfer and to be transported by ambulance/helicopter  [  ]  I consent to the stated transfer, but refuse transportation by ambulance and accept full responsibility for my transportation by car    I understand the risks of non-ambulance transfers and I exonerate the Hospital and its staff from any deterioration in my condition that results from this refusal     X___________________________________________    DATE  22  TIME________  Signature of patient or legally responsible individual signing on patient behalf           RELATIONSHIP TO PATIENT_________________________          Provider Certification    NAME Suresh VALLECILLO 1950                              MRN [id number]    A medical screening exam was performed on the above named patient  Based on the examination:    Condition Necessitating Transfer The primary encounter diagnosis was NSTEMI (non-ST elevated myocardial infarction) ([facility] [address] )  A diagnosis of CAD (coronary artery disease) was also pertinent to this visit  Patient Condition:      Reason for Transfer:      Transfer Requirements: Facility     · Space available and qualified personnel available for treatment as acknowledged by    · Agreed to accept transfer and to provide appropriate medical treatment as acknowledged by          · Appropriate medical records of the examination and treatment of the patient are provided at the time of transfer   [address] [facility], [address] _______  · Transfer will be performed by qualified personnel from    and appropriate transfer equipment as required, including the use of necessary and appropriate life support measures      Provider Certification: I have examined the patient and explained the following risks and benefits of being transferred/refusing transfer to the patient/family:         Based on these reasonable risks and benefits to the patient and/or the unborn child(gagan), and based upon the information available at the time of the patients examination, I certify that the medical benefits reasonably to be expected from the provision of appropriate medical treatments at another medical facility outweigh the increasing risks, if any, to the individuals medical condition, and in the case of labor to the unborn child, from effecting the transfer      X____________________________________________ DATE 05/20/22        TIME_______      ORIGINAL - SEND TO MEDICAL RECORDS   COPY - SEND WITH PATIENT DURING TRANSFER

## 2022-05-20 NOTE — ASSESSMENT & PLAN NOTE
Wt Readings from Last 3 Encounters:   05/19/22 87 8 kg (193 lb 9 oz)   05/10/22 86 4 kg (190 lb 7 6 oz)   03/29/21 88 9 kg (196 lb)     · Patient appears euvolemic on exam  · Continue p o   Lasix 40 mg daily  · Monitor intake and output with daily weights

## 2022-05-20 NOTE — ED NOTES
Dr Mily Chadwick states that patient can eat ice chips at this time; Sena Llanos RN provided patient with ice chips at this time      Paula Cardona RN  05/20/22 1835

## 2022-05-20 NOTE — ED NOTES
Called lab regarding PTT result- needs to be redrawn at this time      Cassius Page RN  05/20/22 1767

## 2022-05-20 NOTE — ED NOTES
Report given to the receiving nurse at [facility]       Teofilo Reddy RN  05/20/22 7417

## 2022-05-20 NOTE — ASSESSMENT & PLAN NOTE
· Patient presented the emergency room with complaint of chest pain  · History of known severe triple-vessel disease on cardiac catheterization in 4/15/2022  · Options at that time for percutaneous intervention of CAD would be complicated by the patient's severe peripheral arterial disease disease  As such ultimate decision regarding CABG, PCI, or medical therapy were deferred and to be addressed in the outpatient setting after the patient allowed full recovery from her acute illness    · Patient follows with [facility]  · Elevated troponin ED  · CTA negative for PE  · Cardiology consulted  · Placed on heparin drip in the ED, continue  · Hold Eliquis while on heparin drip  · Continue aspirin, beta-blocker and statin  · Patient transferred to [facility] for CT surgery vs  High risk PCI

## 2022-05-20 NOTE — ED NOTES
Assumed care of patient at this time- patient noted to be resting comfortably;     Susanne Linton, GARETH  05/20/22 0460

## 2022-05-20 NOTE — ASSESSMENT & PLAN NOTE
[facility] 4/15/2022, [facility]:  Multivessel CAD -- 80% ostial RCA, calcified ostial LM stenosis  99% mLAD affecting origin of small D2  Continue with above treatment  Cardio eval

## 2022-05-20 NOTE — ED NOTES
Obtain verbal order from Dr Carlita Colon during repeat PTT via [employer] Text at this time;      Jazmine Salcido RN  05/20/22 8854

## 2022-05-20 NOTE — ED NOTES
at [phone] via SLETs to Room 231-2  Dr Duncan [facility]   Report [phone]       Blanca Tenorio RN  05/20/22 3399

## 2022-05-20 NOTE — ED NOTES
RN messaged provider to make sure provider was aware of troponin, patient denies CP to this RN   Will continue to monitor for CP or change in status      Bogdan Darling RN  05/20/22 5948

## 2022-05-20 NOTE — CONSULTS
Mansoor Dawson A Christiano [id number], 67 y o  female MRN: [id number]  Unit/Bed#: ED 14 Encounter: [id number]  Primary Care Provider: Nadeem Albert MD   Date and time admitted to hospital: 5/19/2022  5:59 PM    Consult to internal medicine  Consult performed by: Courtney Patterson PA-C  Consult ordered by: Travis Nava DO          * Chest pain  Assessment & Plan  · Patient presented the emergency room with complaint of chest pain  · History of known severe triple-vessel disease on cardiac catheterization in April  · Patient was evaluated for possible surgical revascularization-appears patient was deemed not a candidate for open-heart surgery as she was being treated for influenza  · Patient follows with [facility]  · High risk percutaneous intervention was considered but placed on hold given severe peripheral arterial disease  · Troponin 67, repeat 482  · CTA negative for PE  · Cardiology consult  · Monitor on telemetry  · Placed on heparin drip in the ED, continue  · Hold Eliquis while on heparin drip  · Continue aspirin and statin  · Pending transfer to [facility] for CT surgery vs  High risk PCI    Chronic combined systolic and diastolic heart failure (HCC)  Assessment & Plan  Wt Readings from Last 3 Encounters:   05/19/22 87 8 kg (193 lb 9 oz)   05/10/22 86 4 kg (190 lb 7 6 oz)   03/29/21 88 9 kg (196 lb)     · Patient appears euvolemic on exam  · Continue p o   Lasix 40 mg daily  · Monitor intake and output with daily weights      Aortoiliac occlusive disease (HCC)  Assessment & Plan  · Patient with known aortoiliac occlusive disease with bilateral iliac stents  · Follows outpatient with vascular surgery  · Continue aspirin and statin    Diabetes mellitus ([address] )  Assessment & Plan    Lab Results   Component Value Date    HGBA1C 6 5 (H) 03/30/2022     · Hold oral metformin  · Sliding scale insulin coverage with Accu-Cheks      VTE Prophylaxis: VTE Score: 6 High Risk (Score >/= 5) - Pharmacological DVT Prophylaxis Ordered: heparin drip  Sequential Compression Devices Ordered  Recommendations for Discharge:  · Transfer to [facility]    Counseling / Coordination of Care Time: 60 minutes Greater than 50% of total time spent on patient counseling and coordination of care  Collaboration of Care: Were Recommendations Directly Discussed with Primary Treatment Team? Yes    History of Present Illness:  Mg Paredes is a 67 y o  female who is originally admitted to the ED service due to chest pain  We are consulted for medical management until transfer to [facility]  Patient presented to the ED with chest pain  Notes she was walking in a [place]emetary visiting family member's graves, then went to the store and had lunch at a diner  She notes when leaving the diner she developed substernal chest pressure  She reports the pain was constant for about an hour prior to coming to the ED  She notes a few episodes of chest pain while in the ED but of less severity and lasting only a few minutes  She denies any associated shortness of breath, diaphoresis or nausea  She does note chronic SOB and cough since her admission with COVID in 2020 but denies worsening today  She reports she has been intermittently compliant with her medications since discharge but notes she missed a few doses of her meds because she feel asleep without taking them  She reports she did not take her Eliquis yesterday or today  She denies any recent fevers or chills  Review of Systems:  Review of Systems   Constitutional: Negative for chills and fever  HENT: Negative for trouble swallowing  Eyes: Negative for visual disturbance  Respiratory: Positive for cough and shortness of breath  Cardiovascular: Positive for chest pain  Gastrointestinal: Negative for abdominal pain and diarrhea  Genitourinary: Negative for difficulty urinating  Musculoskeletal: Negative for back pain  Skin: Negative for rash  Neurological: Negative for dizziness and weakness  Psychiatric/Behavioral: Negative for sleep disturbance  Past Medical and Surgical History:   Past Medical History:   Diagnosis Date    Acid reflux     Carotid artery disease ([facility] )     Diabetes mellitus (HCC)     NIDDM    Edema     bles    Full dentures     History of hepatitis C     History of shingles     healed    Hx of renal calculi     Hyperlipidemia     Hypertension     Kidney stone     Liver disease     Nocturnal leg cramps     OA (osteoarthritis) of knee     left    PVD (peripheral vascular disease) ([facility] [address] )     S/P insertion of iliac artery stent     miles    Urinary incontinence     Wears glasses        Past Surgical History:   Procedure Laterality Date    APPENDECTOMY      COLONOSCOPY      ESOPHAGEAL DILATION      ESOPHAGOGASTRODUODENOSCOPY      ILIAC ARTERY STENT Bilateral     KIDNEY STONE SURGERY      MT TOTAL KNEE ARTHROPLASTY Left 3/7/2017    Procedure: ARTHROPLASTY KNEE TOTAL;  Surgeon: Jory Ponce MD;  Location: [facility];  Service: Orthopedics       Meds/Allergies:  PTA meds:   Prior to Admission Medications   Prescriptions Last Dose Informant Patient Reported? Taking? Melatonin 5 MG TABS  Self Yes Yes   Sig: Take 5 mg by mouth   RA COUGH DM 30 MG/5ML SUER Not Taking at Unknown time Self Yes No   Sig: take 10 milliliters by mouth twice a day   Patient not taking: Reported on 5/19/2022   apixaban (Eliquis) 5 mg   Yes Yes   Sig: Take 5 mg by mouth in the morning and 5 mg in the evening  aspirin (ECOTRIN LOW STRENGTH) 81 mg EC tablet  Self No Yes   Sig: Take 1 tablet (81 mg total) by mouth daily   atorvastatin (LIPITOR) 10 mg tablet  Self Yes Yes   Sig: Lipitor 10 MG Oral Tablet   Refills: 0    Active- 1 tab daily   benzonatate (TESSALON PERLES) 100 mg capsule   Yes Yes   Sig: Take 100 mg by mouth as needed in the morning and 100 mg as needed at noon and 100 mg as needed in the evening for cough     cyclobenzaprine (FLEXERIL) 10 mg tablet  Self Yes Yes   Sig: Cyclobenzaprine HCl - 10 MG Oral Tablet   Refills: 0    Active- 1 tab q hs prn - pt reporting taking 0 5 tablet if needed   fluticasone (FLONASE) 50 mcg/act nasal spray Not Taking at Unknown time Self Yes No   Sig: Fluticasone Propionate 50 MCG/ACT Nasal Suspension   Refills: 0    Active- 2 sprays each nostril daily prn   Patient not taking: Reported on 5/19/2022   fluticasone-umeclidinium-vilanterol (Trelegy Ellipta) 100-62 5-25 MCG/INH inhaler   Yes Yes   Sig: Inhale 1 puff daily Rinse mouth after use  furosemide (Lasix) 40 mg tablet   No Yes   Sig: Take 1 tablet (40 mg total) by mouth daily   Patient taking differently: Take 20 mg by mouth in the morning  lansoprazole (PREVACID) 30 mg capsule  Self Yes Yes   Sig: Take 30 mg by mouth daily   meclizine (ANTIVERT) 25 mg tablet  Self Yes Yes   Sig: Take 25 mg by mouth daily    meloxicam (MOBIC) 7 5 mg tablet Not Taking at Unknown time Self Yes No   Sig: Take 7 5 mg by mouth daily   Patient not taking: Reported on 5/19/2022   metFORMIN (GLUCOPHAGE) 850 mg tablet  Self Yes Yes   Sig: Take 850 mg by mouth 2 (two) times a day with meals   metoprolol tartrate (LOPRESSOR) 25 mg tablet   Yes Yes   Sig: Take 25 mg by mouth every 12 (twelve) hours   montelukast (SINGULAIR) 10 mg tablet  Self Yes No   Sig: Take 10 mg by mouth   oxyCODONE (ROXICODONE) 5 mg immediate release tablet Not Taking at Unknown time Self No No   Sig: Take 1-2 tablets by mouth every 4 (four) hours as needed for moderate pain Max Daily Amount: 60 mg   Patient not taking: No sig reported      Facility-Administered Medications: None       Allergies:    Allergies   Allergen Reactions    Codeine GI Intolerance    Compazine [Prochlorperazine] Other (See Comments)     Eyes rolled up in head    Tramadol GI Intolerance       Social History:  Marital Status: Single  Substance Use History:   Social History     Substance and Sexual Activity   Alcohol Use Never Social History     Tobacco Use   Smoking Status Former Smoker    Quit date: 2001    Years since quittin 4   Smokeless Tobacco Never Used     Social History     Substance and Sexual Activity   Drug Use No       Family History:  Family History   Family history unknown: Yes       Physical Exam:   Vitals:   Blood Pressure: 116/83 (22)  Pulse: 76 (22)  Temperature: 98 2 °F (36 8 °C) (22)  Temp Source: Oral (22)  Respirations: 22 (22)  Weight - Scale: 87 8 kg (193 lb 9 oz) (22)  SpO2: 94 % (22)    Physical Exam  Vitals reviewed  Constitutional:       General: She is not in acute distress  HENT:      Head: Normocephalic and atraumatic  Eyes:      General: No scleral icterus  Conjunctiva/sclera: Conjunctivae normal    Cardiovascular:      Rate and Rhythm: Normal rate and regular rhythm  Heart sounds: No murmur heard  Pulmonary:      Effort: Pulmonary effort is normal  No respiratory distress  Breath sounds: Normal breath sounds  Abdominal:      General: Bowel sounds are normal  There is no distension  Palpations: Abdomen is soft  Tenderness: There is no abdominal tenderness  Musculoskeletal:      Cervical back: Neck supple  Right lower leg: No edema  Left lower leg: No edema  Skin:     General: Skin is warm and dry  Neurological:      Mental Status: She is alert and oriented to person, place, and time     Psychiatric:         Mood and Affect: Mood normal          Behavior: Behavior normal           Additional Data:   Lab Results:    Results from last 7 days   Lab Units 22  1821   WBC Thousand/uL 6 65   HEMOGLOBIN g/dL 11 8   HEMATOCRIT % 38 8   PLATELETS Thousands/uL 129*   NEUTROS PCT % 58   LYMPHS PCT % 30   MONOS PCT % 10   EOS PCT % 1     Results from last 7 days   Lab Units 22  1821   SODIUM mmol/L 141   POTASSIUM mmol/L 3 3*   CHLORIDE mmol/L 107   CO2 mmol/L 22   BUN mg/dL 7   CREATININE mg/dL 1 05   ANION GAP mmol/L 12   CALCIUM mg/dL 8 9   GLUCOSE RANDOM mg/dL 140     Results from last 7 days   Lab Units 05/19/22  1925   INR  0 98         Lab Results   Component Value Date/Time    HGBA1C 6 5 (H) 03/30/2022 09:10 AM    HGBA1C 6 1 (H) 06/10/2021 09:00 AM    HGBA1C 6 8 (H) 02/19/2021 10:51 AM    HGBA1C 6 4 (H) 11/02/2020 08:17 PM    HGBA1C 7 8 (H) 05/05/2020 12:35 AM               Imaging: Reviewed radiology reports from this admission including: chest CT scan  CTA ED chest PE study   ED Interpretation by Yg Croft DO (05/19 2128)   Abnormal   See below      Final Result by Mattie Santo MD (05/19 2118)      1  No evidence of pulmonary embolism  2   Pulmonary edema and mild dependent atelectasis  Interval resolution of small bilateral pleural effusions  3   5 mm nodule in the left lower lobe  Based on current Fleischner Society 2017 Guidelines on incidental pulmonary nodule, no routine follow-up is needed if the patient is low risk  If the patient is high risk, optional follow-up chest CT at 12 months    can be considered  Workstation performed: [id number]         XR chest 1 view portable   ED Interpretation by Yg Croft DO (05/19 2139)   Abnormal   Increased markings throughout          EKG, Pathology, and Other Studies Reviewed on Admission:   · EKG: NSR  HR 99     ** Please Note: This note may have been constructed using a voice recognition system   **

## 2022-05-20 NOTE — ASSESSMENT & PLAN NOTE
Wt Readings from Last 3 Encounters:   05/19/22 87 8 kg (193 lb 9 oz)   05/10/22 86 4 kg (190 lb 7 6 oz)   03/29/21 88 9 kg (196 lb)     Cardiac echo on 04/15 with EF 45-50%  Chest CT scan with pulmonary edema  · Continue with Lasix, change to IV  · Monitor intake and output with daily weights

## 2022-05-20 NOTE — ED NOTES
Pt began to breathe heavier and complain of SOB, oxygen sats at 87% RN placed on 6L NC oxygen nadeen to 95%     Bogdan Darling RN  05/19/22 2032

## 2022-05-21 ENCOUNTER — APPOINTMENT (INPATIENT)
Dept: NON INVASIVE DIAGNOSTICS | Facility: HOSPITAL | Age: 72
DRG: 246 | End: 2022-05-21
Payer: COMMERCIAL

## 2022-05-21 PROBLEM — E66.9 OBESE: Status: ACTIVE | Noted: 2022-05-21

## 2022-05-21 LAB
ANION GAP SERPL CALCULATED.3IONS-SCNC: 5 MMOL/L (ref 4–13)
APTT PPP: 68 SECONDS (ref 23–37)
APTT PPP: 81 SECONDS (ref 23–37)
ATRIAL RATE: 85 BPM
BASOPHILS # BLD AUTO: 0.07 THOUSANDS/ΜL (ref 0–0.1)
BASOPHILS NFR BLD AUTO: 1 % (ref 0–1)
BUN SERPL-MCNC: 10 MG/DL (ref 5–25)
CALCIUM SERPL-MCNC: 9.2 MG/DL (ref 8.3–10.1)
CHLORIDE SERPL-SCNC: 108 MMOL/L (ref 100–108)
CHOLEST SERPL-MCNC: 190 MG/DL
CO2 SERPL-SCNC: 28 MMOL/L (ref 21–32)
CREAT SERPL-MCNC: 0.88 MG/DL (ref 0.6–1.3)
CREAT UR-MCNC: 101 MG/DL
EOSINOPHIL # BLD AUTO: 0.13 THOUSAND/ΜL (ref 0–0.61)
EOSINOPHIL NFR BLD AUTO: 2 % (ref 0–6)
ERYTHROCYTE [DISTWIDTH] IN BLOOD BY AUTOMATED COUNT: 14.9 % (ref 11.6–15.1)
EST. AVERAGE GLUCOSE BLD GHB EST-MCNC: 134 MG/DL
GFR SERPL CREATININE-BSD FRML MDRD: 65 ML/MIN/1.73SQ M
GLUCOSE SERPL-MCNC: 142 MG/DL (ref 65–140)
GLUCOSE SERPL-MCNC: 156 MG/DL (ref 65–140)
GLUCOSE SERPL-MCNC: 161 MG/DL (ref 65–140)
GLUCOSE SERPL-MCNC: 212 MG/DL (ref 65–140)
GLUCOSE SERPL-MCNC: >500 MG/DL (ref 65–140)
HBA1C MFR BLD: 6.3 %
HCT VFR BLD AUTO: 34.2 % (ref 34.8–46.1)
HDLC SERPL-MCNC: 38 MG/DL
HGB BLD-MCNC: 11 G/DL (ref 11.5–15.4)
IMM GRANULOCYTES # BLD AUTO: 0.02 THOUSAND/UL (ref 0–0.2)
IMM GRANULOCYTES NFR BLD AUTO: 0 % (ref 0–2)
LDLC SERPL CALC-MCNC: 110 MG/DL (ref 0–100)
LYMPHOCYTES # BLD AUTO: 2.29 THOUSANDS/ΜL (ref 0.6–4.47)
LYMPHOCYTES NFR BLD AUTO: 30 % (ref 14–44)
MCH RBC QN AUTO: 29.1 PG (ref 26.8–34.3)
MCHC RBC AUTO-ENTMCNC: 32.2 G/DL (ref 31.4–37.4)
MCV RBC AUTO: 91 FL (ref 82–98)
MICROALBUMIN UR-MCNC: 31 MG/L (ref 0–20)
MICROALBUMIN/CREAT 24H UR: 31 MG/G CREATININE (ref 0–30)
MONOCYTES # BLD AUTO: 0.95 THOUSAND/ΜL (ref 0.17–1.22)
MONOCYTES NFR BLD AUTO: 13 % (ref 4–12)
NEUTROPHILS # BLD AUTO: 4.09 THOUSANDS/ΜL (ref 1.85–7.62)
NEUTS SEG NFR BLD AUTO: 54 % (ref 43–75)
NONHDLC SERPL-MCNC: 152 MG/DL
NRBC BLD AUTO-RTO: 0 /100 WBCS
P AXIS: 45 DEGREES
PLATELET # BLD AUTO: 156 THOUSANDS/UL (ref 149–390)
PMV BLD AUTO: 10.6 FL (ref 8.9–12.7)
POTASSIUM SERPL-SCNC: 3.7 MMOL/L (ref 3.5–5.3)
PR INTERVAL: 152 MS
QRS AXIS: -5 DEGREES
QRSD INTERVAL: 78 MS
QT INTERVAL: 440 MS
QTC INTERVAL: 523 MS
RBC # BLD AUTO: 3.78 MILLION/UL (ref 3.81–5.12)
SODIUM SERPL-SCNC: 141 MMOL/L (ref 136–145)
T WAVE AXIS: 164 DEGREES
TRIGL SERPL-MCNC: 208 MG/DL
VENTRICULAR RATE: 85 BPM
WBC # BLD AUTO: 7.55 THOUSAND/UL (ref 4.31–10.16)

## 2022-05-21 PROCEDURE — 93971 EXTREMITY STUDY: CPT

## 2022-05-21 PROCEDURE — 82043 UR ALBUMIN QUANTITATIVE: CPT | Performed by: INTERNAL MEDICINE

## 2022-05-21 PROCEDURE — 94760 N-INVAS EAR/PLS OXIMETRY 1: CPT

## 2022-05-21 PROCEDURE — 85025 COMPLETE CBC W/AUTO DIFF WBC: CPT | Performed by: INTERNAL MEDICINE

## 2022-05-21 PROCEDURE — 99223 1ST HOSP IP/OBS HIGH 75: CPT | Performed by: INTERNAL MEDICINE

## 2022-05-21 PROCEDURE — 83036 HEMOGLOBIN GLYCOSYLATED A1C: CPT | Performed by: INTERNAL MEDICINE

## 2022-05-21 PROCEDURE — 85730 THROMBOPLASTIN TIME PARTIAL: CPT | Performed by: INTERNAL MEDICINE

## 2022-05-21 PROCEDURE — 99223 1ST HOSP IP/OBS HIGH 75: CPT | Performed by: PHYSICIAN ASSISTANT

## 2022-05-21 PROCEDURE — 94664 DEMO&/EVAL PT USE INHALER: CPT

## 2022-05-21 PROCEDURE — 93010 ELECTROCARDIOGRAM REPORT: CPT | Performed by: INTERNAL MEDICINE

## 2022-05-21 PROCEDURE — 99232 SBSQ HOSP IP/OBS MODERATE 35: CPT | Performed by: INTERNAL MEDICINE

## 2022-05-21 PROCEDURE — 80048 BASIC METABOLIC PNL TOTAL CA: CPT | Performed by: INTERNAL MEDICINE

## 2022-05-21 PROCEDURE — 97163 PT EVAL HIGH COMPLEX 45 MIN: CPT

## 2022-05-21 PROCEDURE — 82948 REAGENT STRIP/BLOOD GLUCOSE: CPT

## 2022-05-21 PROCEDURE — 80061 LIPID PANEL: CPT | Performed by: INTERNAL MEDICINE

## 2022-05-21 PROCEDURE — 93971 EXTREMITY STUDY: CPT | Performed by: SURGERY

## 2022-05-21 PROCEDURE — 82570 ASSAY OF URINE CREATININE: CPT | Performed by: INTERNAL MEDICINE

## 2022-05-21 RX ORDER — ALBUTEROL SULFATE 90 UG/1
2 AEROSOL, METERED RESPIRATORY (INHALATION) EVERY 4 HOURS PRN
Status: DISCONTINUED | OUTPATIENT
Start: 2022-05-21 | End: 2022-05-26 | Stop reason: HOSPADM

## 2022-05-21 RX ORDER — BENZONATATE 100 MG/1
100 CAPSULE ORAL 3 TIMES DAILY
Status: DISCONTINUED | OUTPATIENT
Start: 2022-05-21 | End: 2022-05-26 | Stop reason: HOSPADM

## 2022-05-21 RX ORDER — GUAIFENESIN/DEXTROMETHORPHAN 100-10MG/5
10 SYRUP ORAL EVERY 4 HOURS PRN
Status: DISCONTINUED | OUTPATIENT
Start: 2022-05-21 | End: 2022-05-26 | Stop reason: HOSPADM

## 2022-05-21 RX ORDER — LEVALBUTEROL INHALATION SOLUTION 1.25 MG/3ML
1.25 SOLUTION RESPIRATORY (INHALATION)
Status: DISCONTINUED | OUTPATIENT
Start: 2022-05-21 | End: 2022-05-24

## 2022-05-21 RX ORDER — FUROSEMIDE 10 MG/ML
40 INJECTION INTRAMUSCULAR; INTRAVENOUS
Status: DISCONTINUED | OUTPATIENT
Start: 2022-05-21 | End: 2022-05-24

## 2022-05-21 RX ADMIN — BENZONATATE 100 MG: 100 CAPSULE ORAL at 16:04

## 2022-05-21 RX ADMIN — ATORVASTATIN CALCIUM 40 MG: 40 TABLET, FILM COATED ORAL at 16:04

## 2022-05-21 RX ADMIN — PANTOPRAZOLE SODIUM 40 MG: 40 TABLET, DELAYED RELEASE ORAL at 05:02

## 2022-05-21 RX ADMIN — METOPROLOL TARTRATE 25 MG: 25 TABLET, FILM COATED ORAL at 21:51

## 2022-05-21 RX ADMIN — FUROSEMIDE 40 MG: 10 INJECTION, SOLUTION INTRAMUSCULAR; INTRAVENOUS at 08:32

## 2022-05-21 RX ADMIN — BENZONATATE 100 MG: 100 CAPSULE ORAL at 21:51

## 2022-05-21 RX ADMIN — INSULIN LISPRO 1 UNITS: 100 INJECTION, SOLUTION INTRAVENOUS; SUBCUTANEOUS at 21:52

## 2022-05-21 RX ADMIN — BENZONATATE 100 MG: 100 CAPSULE ORAL at 12:56

## 2022-05-21 RX ADMIN — FUROSEMIDE 40 MG: 10 INJECTION, SOLUTION INTRAMUSCULAR; INTRAVENOUS at 16:04

## 2022-05-21 RX ADMIN — INSULIN LISPRO 5 UNITS: 100 INJECTION, SOLUTION INTRAVENOUS; SUBCUTANEOUS at 16:12

## 2022-05-21 RX ADMIN — GUAIFENESIN 600 MG: 600 TABLET, EXTENDED RELEASE ORAL at 08:32

## 2022-05-21 RX ADMIN — HEPARIN SODIUM 15.8 UNITS/KG/HR: 10000 INJECTION, SOLUTION INTRAVENOUS at 12:41

## 2022-05-21 RX ADMIN — Medication 3 MG: at 21:51

## 2022-05-21 RX ADMIN — ASPIRIN 81 MG: 81 TABLET, COATED ORAL at 08:32

## 2022-05-21 RX ADMIN — FLUTICASONE FUROATE AND VILANTEROL TRIFENATATE 1 PUFF: 100; 25 POWDER RESPIRATORY (INHALATION) at 08:31

## 2022-05-21 RX ADMIN — METOPROLOL TARTRATE 25 MG: 25 TABLET, FILM COATED ORAL at 08:32

## 2022-05-21 RX ADMIN — LEVALBUTEROL HYDROCHLORIDE 1.25 MG: 1.25 SOLUTION RESPIRATORY (INHALATION) at 19:32

## 2022-05-21 NOTE — PLAN OF CARE
Problem: PHYSICAL THERAPY ADULT  Goal: Performs mobility at highest level of function for planned discharge setting  See evaluation for individualized goals  Description: Treatment/Interventions: Functional transfer training, LE strengthening/ROM, Elevations, Therapeutic exercise, Endurance training, Equipment eval/education, Bed mobility, Gait training, Patient/family training          See flowsheet documentation for full assessment, interventions and recommendations  Note: Prognosis: Good  Problem List: Decreased strength, Decreased endurance, Impaired balance, Decreased mobility, Pain  Assessment: Pt seen for physical therapy evaluation  Pt is a 66 y/o female w/ history/comorbidities of CAD, PAD, HTN, DM II< COPD who is now admitted as a transfer from [facility]- seen there w/ chest pain  Undergoing w/u- pt w/ known CAD and transferred for increased level of care  Has been deemed not a surgical candidate in the past   Due to acute medical issues, unclear medical dx, pain, fall risk, note unstable clinical picture  PT consulted to assess mobility, d/c needs  Pt presents w/ decreased functional mob, standing balance, endurance, B LE strength, barriers at home  Pt will benefit from skilled PT to correct for the above problems  When stable, anticipate d/c home w/ no therapy needs  PT Discharge Recommendation: No rehabilitation needs          See flowsheet documentation for full assessment

## 2022-05-21 NOTE — PLAN OF CARE
Problem: Potential for Falls  Goal: Patient will remain free of falls  Description: INTERVENTIONS:  - Educate patient/family on patient safety including physical limitations  - Instruct patient to call for assistance with activity   - Consult OT/PT to assist with strengthening/mobility   - Keep Call bell within reach  - Keep bed low and locked with side rails adjusted as appropriate  - Keep care items and personal belongings within reach  - Initiate and maintain comfort rounds  - Make Fall Risk Sign visible to staff  - Offer Toileting every 2 Hours, in advance of need  - Initiate/Maintain alarm  - Obtain necessary fall risk management equipment  - Apply yellow socks and bracelet for high fall risk patients  - Consider moving patient to room near nurses station  Outcome: Progressing     Problem: PAIN - ADULT  Goal: Verbalizes/displays adequate comfort level or baseline comfort level  Description: Interventions:  - Encourage patient to monitor pain and request assistance  - Assess pain using appropriate pain scale  - Administer analgesics based on type and severity of pain and evaluate response  - Implement non-pharmacological measures as appropriate and evaluate response  - Consider cultural and social influences on pain and pain management  - Notify physician/advanced practitioner if interventions unsuccessful or patient reports new pain  Outcome: Progressing     Problem: INFECTION - ADULT  Goal: Absence or prevention of progression during hospitalization  Description: INTERVENTIONS:  - Assess and monitor for signs and symptoms of infection  - Monitor lab/diagnostic results  - Monitor all insertion sites, i e  indwelling lines, tubes, and drains  - Monitor endotracheal if appropriate and nasal secretions for changes in amount and color  - [place] appropriate cooling/warming therapies per order  - Administer medications as ordered  - Instruct and encourage patient and family to use good hand hygiene technique  - Identify and instruct in appropriate isolation precautions for identified infection/condition  Outcome: Progressing  Goal: Absence of fever/infection during neutropenic period  Description: INTERVENTIONS:  - Monitor WBC    Outcome: Progressing     Problem: SAFETY ADULT  Goal: Maintain or return to baseline ADL function  Description: INTERVENTIONS:  -  Assess patient's ability to carry out ADLs; assess patient's baseline for ADL function and identify physical deficits which impact ability to perform ADLs (bathing, care of mouth/teeth, toileting, grooming, dressing, etc )  - Assess/evaluate cause of self-care deficits   - Assess range of motion  - Assess patient's mobility; develop plan if impaired  - Assess patient's need for assistive devices and provide as appropriate  - Encourage maximum independence but intervene and supervise when necessary  - Involve family in performance of ADLs  - Assess for home care needs following discharge   - Consider OT consult to assist with ADL evaluation and planning for discharge  - Provide patient education as appropriate  Outcome: Progressing  Goal: Maintains/Returns to pre admission functional level  Description: INTERVENTIONS:  - Perform BMAT or MOVE assessment daily    - Set and communicate daily mobility goal to care team and patient/family/caregiver  - Collaborate with rehabilitation services on mobility goals if consulted  - Perform Range of Motion 3 times a day  - Reposition patient every 2 hours    - Dangle patient 3 times a day  - Stand patient 3 times a day  - Ambulate patient 3 times a day  - Out of bed to chair 3 times a day   - Out of bed for meals 3 times a day  - Out of bed for toileting  - Record patient progress and toleration of activity level   Outcome: Pressing     Problem: DISCHARGE PLANNING  Goal: Discharge to home or other facility with appropriate resources  Description: INTERVENTIONS:  - Identify barriers to discharge w/patient and caregiver  - Arrange for needed discharge resources and transportation as appropriate  - Identify discharge learning needs (meds, wound care, etc )  - Arrange for interpretive services to assist at discharge as needed  - Refer to Case Management Department for coordinating discharge planning if the patient needs post-hospital services based on physician/advanced practitioner order or complex needs related to functional status, cognitive ability, or social support system  Outcome: Progressing     Problem: Knowledge Deficit  Goal: Patient/family/caregiver demonstrates understanding of disease process, treatment plan, medications, and discharge instructions  Description: Complete learning assessment and assess knowledge base    Interventions:  - Provide teaching at level of understanding  - Provide teaching via preferred learning methods  Outcome: Progressing

## 2022-05-21 NOTE — CONSULTS
Consultation - Cardiothoracic Surgery   Bari Nolan 67 y o  female MRN: [id number]  Unit/Bed#: [facility]  213-02 Encounter: [id number]    Physician Requesting Consult: Antonio Dover MD    Reason for Consult / Principal Problem: MV CAD    Inpatient consult to Cardiothoracic Surgery  Consult performed by: Talon Gonzales PA-C  Consult ordered by: Carlos Will MD        History of Present Illness: Bari Nolan is a 67y o  year old female which identified multivessel coronary artery disease  Following this test she underwent vein mapping and carotid artery ultrasound in anticipation of coronary artery bypass grafting  This was scheduled to be completed electively  She tells me today that there was some concern over the adequacy of her saphenous vein conduit for open heart surgery  More recently, she began to have recurrence of angina and presented for evaluation and treatment at [place] [address]  Upon presentation she ruled in for acute coronary syndrome with troponin elevation  She was subsequent transferred to [facility] for cardiothoracic surgery consultation versus PCI options  During interview today she denies recurrence of symptoms since admission to the hospital   Her past medical history is otherwise significant for diabetes, peripheral arterial disease having undergone bilateral iliac stenting in the remote past, carotid artery stenosis, hypertension, hyperlipidemia, CVA in April 2020, DVT on Apixaban, and GERD      Past Medical History:  Past Medical History:   Diagnosis Date    Acid reflux     Carotid artery disease ([facility] [address] )     Diabetes mellitus (HCC)     NIDDM    Edema     bles    Full dentures     History of hepatitis C     History of shingles     healed    Hx of renal calculi     Hyperlipidemia     Hypertension     Kidney stone     Liver disease     Nocturnal leg cramps     OA (osteoarthritis) of knee     left    PVD (peripheral vascular disease) ([email or web address]     S/P insertion of iliac artery stent     miles    Urinary incontinence     Wears glasses          Past Surgical History:   Past Surgical History:   Procedure Laterality Date    APPENDECTOMY      COLONOSCOPY      ESOPHAGEAL DILATION      ESOPHAGOGASTRODUODENOSCOPY      ILIAC ARTERY STENT Bilateral     KIDNEY STONE SURGERY      ND TOTAL KNEE ARTHROPLASTY Left 3/7/2017    Procedure: ARTHROPLASTY KNEE TOTAL;  Surgeon: Genoveva Rojas MD;  Location: [facility];  Service: Orthopedics         Family History:  Family History   Family history unknown: Yes         Social History:  Social History     Substance and Sexual Activity   Alcohol Use Never     Social History     Substance and Sexual Activity   Drug Use No     Social History     Tobacco Use   Smoking Status Former Smoker    Quit date: 2001    Years since quittin 4   Smokeless Tobacco Never Used     Marital Status: Single      Home Medications:   Prior to Admission medications    Medication Sig Start Date End Date Taking? Authorizing Provider   apixaban (ELIQUIS) 5 mg Take 5 mg by mouth in the morning and 5 mg in the evening  Yes Historical Provider, MD   aspirin (ECOTRIN LOW STRENGTH) 81 mg EC tablet Take 1 tablet (81 mg total) by mouth daily 20   NATALIA Noel   atorvastatin (LIPITOR) 10 mg tablet Lipitor 10 MG Oral Tablet   Refills: 0    Active- 1 tab daily    Historical Provider, MD   benzonatate (TESSALON PERLES) 100 mg capsule Take 100 mg by mouth as needed in the morning and 100 mg as needed at noon and 100 mg as needed in the evening for cough      Historical Provider, MD   cyclobenzaprine (FLEXERIL) 10 mg tablet Cyclobenzaprine HCl - 10 MG Oral Tablet   Refills: 0    Active- 1 tab q hs prn - pt reporting taking 0 5 tablet if needed    Historical Provider, MD   fluticasone (FLONASE) 50 mcg/act nasal spray Fluticasone Propionate 50 MCG/ACT Nasal Suspension   Refills: 0    Active- 2 sprays each nostril daily prn  Patient not taking: No sig reported    Historical Provider, MD   fluticasone-umeclidinium-vilanterol (Trelegy Ellipta) 100-62 5-25 MCG/INH inhaler Inhale 1 puff daily Rinse mouth after use  Historical Provider, MD   furosemide (Lasix) 40 mg tablet Take 1 tablet (40 mg total) by mouth daily  Patient taking differently: Take 20 mg by mouth in the morning   5/10/22 6/9/22  Gerhardt Euler Ward, DO   lansoprazole (PREVACID) 30 mg capsule Take 30 mg by mouth daily    Historical Provider, MD   meclizine (ANTIVERT) 25 mg tablet Take 25 mg by mouth daily  4/24/20   Historical Provider, MD   Melatonin 5 MG TABS Take 5 mg by mouth 6/5/20   Historical Provider, MD   meloxicam (MOBIC) 7 5 mg tablet Take 7 5 mg by mouth daily  Patient not taking: Reported on 5/19/2022 4/3/20   Historical Provider, MD   metFORMIN (GLUCOPHAGE) 850 mg tablet Take 850 mg by mouth 2 (two) times a day with meals 3/26/21   Historical Provider, MD   metoprolol tartrate (LOPRESSOR) 25 mg tablet Take 25 mg by mouth every 12 (twelve) hours    Historical Provider, MD   montelukast (SINGULAIR) 10 mg tablet Take 10 mg by mouth 7/7/20 3/29/21  Historical Provider, MD   oxyCODONE (ROXICODONE) 5 mg immediate release tablet Take 1-2 tablets by mouth every 4 (four) hours as needed for moderate pain Max Daily Amount: 60 mg  Patient not taking: No sig reported 2/9/37   Chelsey Bey MD   RA COUGH DM 30 MG/5ML SUER take 10 milliliters by mouth twice a day  Patient not taking: Reported on 5/19/2022 4/26/20   Historical Provider, MD       Inpatient Medications:  Scheduled Meds:   Current Facility-Administered Medications   Medication Dose Route Frequency Provider Last Rate    acetaminophen  650 mg Oral Q4H PRN John Rivera, DO      aspirin  81 mg Oral Daily John Rivera, DO      atorvastatin  40 mg Oral Daily With [employer] Olivier, DO      benzonatate  100 mg Oral TID Krupa Pete MD      dextromethorphan-guaiFENesin  10 mL Oral Q4H PRN Krupa Pete MD  fluticasone-vilanterol  1 puff Inhalation Daily Oscar Avila DO      furosemide  40 mg Intravenous BID (diuretic) Pablo Sears MD      heparin (porcine)  3-20 Units/kg/hr (Order-Specific) Intravenous Titrated Oscar Avila DO 15 8 Units/kg/hr (05/21/22 1241)    insulin lispro  1-5 Units Subcutaneous TID AC Oscar Avila DO      insulin lispro  1-5 Units Subcutaneous HS Oscar Avila DO      melatonin  3 mg Oral HS Doretha Kayser, CRNP      metoprolol tartrate  25 mg Oral Q12H [address] Oscar Avila, DO      nitroglycerin  0 4 mg Sublingual Q5 Min PRN Oscar Avila, DO      ondansetron  4 mg Intravenous Q6H PRN Oscar Avila, DO      pantoprazole  40 mg Oral Early Morning Oscar Avila DO       Continuous Infusions: heparin (porcine), 3-20 Units/kg/hr (Order-Specific), Last Rate: 15 8 Units/kg/hr (05/21/22 1241)      PRN Meds:  acetaminophen, 650 mg, Q4H PRN  dextromethorphan-guaiFENesin, 10 mL, Q4H PRN  nitroglycerin, 0 4 mg, Q5 Min PRN  ondansetron, 4 mg, Q6H PRN        Allergies: Allergies   Allergen Reactions    Codeine GI Intolerance    Compazine [Prochlorperazine] Other (See Comments)     Eyes rolled up in head    Tramadol GI Intolerance       Review of Systems:  Review of Systems   Constitutional: Positive for fatigue  HENT: Negative  Eyes: Negative  Respiratory: Positive for chest tightness and shortness of breath  Cardiovascular: Positive for chest pain and leg swelling  Endocrine: Negative  Genitourinary: Negative  Musculoskeletal: Negative  Skin: Negative  Neurological: Negative  Psychiatric/Behavioral: Negative          Vital Signs:     Vitals:    05/21/22 0726 05/21/22 0755 05/21/22 0756 05/21/22 0835   BP: (!) 84/54 119/67 119/67    BP Location:       Pulse:  76 76    Resp:       Temp: 97 8 °F (36 6 °C)      TempSrc:       SpO2:  95% 95% 94%     Invasive Devices  Report    Peripheral Intravenous Line  Duration           Peripheral IV 05/19/22 Left Forearm 1 day Peripheral IV 05/20/22 Right Forearm 1 day                Physical Exam:  Physical Exam  Constitutional:       Appearance: She is well-developed  HENT:      Head: Normocephalic and atraumatic  Eyes:      Conjunctiva/sclera: Conjunctivae normal       Pupils: Pupils are equal, round, and reactive to light  Cardiovascular:      Rate and Rhythm: Normal rate and regular rhythm  Pulmonary:      Effort: Pulmonary effort is normal       Breath sounds: Normal breath sounds  Abdominal:      General: Bowel sounds are normal       Palpations: Abdomen is soft  Musculoskeletal:         General: Normal range of motion  Cervical back: Normal range of motion and neck supple  Skin:     General: Skin is warm and dry  Neurological:      Mental Status: She is alert and oriented to person, place, and time  Psychiatric:         Behavior: Behavior normal          Lab Results:     Results from last 7 days   Lab Units 05/21/22  0507 05/20/22  0453 05/19/22  1821   WBC Thousand/uL 7 55 8 69 6 65   HEMOGLOBIN g/dL 11 0* 12 2 11 8   HEMATOCRIT % 34 2* 38 9 38 8   PLATELETS Thousands/uL 156 204 129*     Results from last 7 days   Lab Units 05/21/22  0507 05/20/22  0453 05/19/22  1821   POTASSIUM mmol/L 3 7 3 4* 3 3*   CHLORIDE mmol/L 108 107 107   CO2 mmol/L 28 24 22   BUN mg/dL 10 6 7   CREATININE mg/dL 0 88 0 90 1 05   CALCIUM mg/dL 9 2 8 5 8 9     Results from last 7 days   Lab Units 05/21/22  1139 05/21/22  0507 05/20/22  2200 05/20/22  0138 05/19/22  1925   INR   --   --   --   --  0 98   PTT seconds 68* 81* 46*   < > 21*    < > = values in this interval not displayed  Lab Results   Component Value Date    HGBA1C 6 5 (H) 03/30/2022     No results found for: CKTOTAL, CKMB, CKMBINDEX, TROPONINI    Imaging Studies:     Cardiac Catheterization: 4/20 at [facility]: Ost LM lesion is 65% stenosed  Prox LAD to Mid LAD lesion is 99% stenosed  First Diagonal Branch: 1st Diag lesion is 55% stenosed   Left Circumflex: Jax Wu Cx lesion is 50% stenosed  Right Coronary Artery: The vessel is tortuous  Ost RCA to Prox RCA lesion is 80% stenosed  Echocardiogram: EF 45%  Trace TR  Vein Mappin/21 at [facility]: Large vein; Adequate below the Knee b/l     Carotid US:  at [facility]: R < 50%  L: 50-69%  B/L VFA  Patent bilateral subclavian arteries without signs of hemodynamically significant stenosis based on waveforms  I have personally reviewed pertinent reports  Assessment:  Principal Problem:    Chest pain  Active Problems:    Diabetes mellitus (HCC)    Aortoiliac occlusive disease (HCC)    Hypertension    Chronic combined systolic and diastolic heart failure (HCC)    CAD (coronary artery disease)    Lung nodule    Obese    Severe coronary artery disease; Ongoing CABG workup    Plan:  Risks and benefits of coronary artery bypass grafting were discussed in detail today with the patient  They understand and wish to proceed with further workup and ultimately surgical intervention  With regard to his vein mapping, the report demonstrates numbers of borderline suitability  As images are unavailable for review, I will repeat a study during this admission  Following this study, final recommendation regarding feasibility of surgical revascularization will be made  Sena Hopper was comfortable with our recommendations, and their questions were answered to their satisfaction  We will continue to evaluate the patient daily with further recommendations as work up is completed  Thank you for allowing us to participate in the care of this patient  SIGNATURE: Wei Bunn PA-C  DATE: May 21, 2022  TIME: 1:22 PM    * This note was completed in part utilizing m-modal fluency direct voice recognition software  Grammatical errors, random word insertion, spelling mistakes, and incomplete sentences may be an occasional consequence of the system secondary to software limitations, ambient noise and hardware issues   At the time of dictation, efforts were made to edit, clarify and /or correct errors  Please read the chart carefully and recognize, using context, where substitutions have occurred  If you have any questions or concerns about the context, text or information contained within the body of this dictation, please contact myself, the provider, for further clarification

## 2022-05-21 NOTE — PROGRESS NOTES
[facility]  Progress Note - Caroline Parisa [birth year], 67 y o  female MRN: [id number]  Unit/Bed#: CW2 213-02 Encounter: [id number]  Primary Care Provider: Larry Lackey MD   Date and time admitted to hospital: 5/20/2022  5:15 PM    * Chest pain  Assessment & Plan  · Patient presented the emergency room with complaint of chest pain  · History of known severe triple-vessel disease on cardiac catheterization in 4/15/2022  · Options at that time for percutaneous intervention of CAD would be complicated by the patient's severe peripheral arterial disease disease  As such ultimate decision regarding CABG, PCI, or medical therapy were deferred and to be addressed in the outpatient setting after the patient allowed full recovery from her acute illness    · Patient [facility]  · Elevated troponin ED  · CTA negative for PE  · Placed on heparin drip in the ED, continue  · Hold Eliquis while on heparin drip  · Continue aspirin, beta-blocker and statin  · Patient transferred to [facility] for CT surgery vs  High risk PCI  · Cardiology following      Chronic combined systolic and diastolic heart failure (HCC)  Assessment & Plan  Wt Readings from Last 3 Encounters:   05/19/22 87 8 kg (193 lb 9 oz)   05/10/22 86 4 kg (190 lb 7 6 oz)   03/29/21 88 9 kg (196 lb)     2D echo EF 45%  Continue Lasix per cardiology  Beta-blocker  Monitor I/O, daily weights  Less than 2 g salt diet fluid restriction  Cardiology following          Obese  Assessment & Plan  Therapeutic lifestyle modification encouraged  Outpatient sleep study recommended    Lung nodule  Assessment & Plan  Incidental finding on CT scan  Repeat chest CT scan in 12 months    CAD (coronary artery disease)  Assessment & Plan  [facility] 4/15/2022, [facility]:  Multivessel CAD -- 80% ostial RCA, calcified ostial LM stenosis  99% mLAD affecting origin of small D2  Continue aspirin beta-blocker statin    Hypertension  Assessment & Plan  Monitor blood pressures  Avoid hypotension    Aortoiliac occlusive disease (HCC)  Assessment & Plan  · Patient with known aortoiliac occlusive disease with bilateral iliac stents  · Follows outpatient with vascular surgery  · Continue aspirin and statin      Diabetes mellitus [facility])  Assessment & Plan  Lab Results   Component Value Date    HGBA1C 6 5 (H) 2022       Recent Labs     22  1506 22  1800 22  2127 22  1059   POCGLU 131 130 138 212*       Blood Sugar Average: Last 72 hrs:  · (P) 160     · Metformin on hold while inpatient  · Continue insulin therapy  · Titrate insulin dose based on Accu-Cheks  · Avoid hypoglycemia  · Hypoglycemia protocol in place            VTE Pharmacologic Prophylaxis: VTE Score: 4 Moderate Risk (Score 3-4) - Pharmacological DVT Prophylaxis Ordered: heparin drip  Patient Centered Rounds: I performed bedside rounds with nursing staff today  Discussions with Specialists or Other Care Team Provider:     Education and Discussions with Family / Patient: Discussed with the patient, her daughter Jazmyne Wang is on the speaker phone along with the patient  Discussed in detail questions answered  Time Spent for Care: 30 minutes  More than 50% of total time spent on counseling and coordination of care as described above      Current Length of Stay: 1 day(s)  Current Patient Status: Inpatient   Certification Statement: The patient will continue to require additional inpatient hospital stay due to As outlined  Discharge Plan: Awaiting clinical and symptomatic improvement, cardiology workup, Cardiology following    Code Status: Level 1 - Full Code    Subjective:     Comfortably in bed  Denies chest pain  Reports cough nonproductive sputum  Denies chest tightness or wheezing  History chart labs medications reviewed    Objective:     Vitals:   Temp (24hrs), Av 9 °F (36 6 °C), Min:97 8 °F (36 6 °C), Max:98 °F (36 7 °C)    Temp:  [97 8 °F (36 6 °C)-98 °F (36 7 °C)] 97 8 °F (36 6 °C)  HR:  [76-94] 76  Resp:  [16-24] 18  BP: ()/(54-78) 119/67  SpO2:  [88 %-96 %] 94 %  There is no height or weight on file to calculate BMI  Input and Output Summary (last 24 hours):      Intake/Output Summary (Last 24 hours) at 5/21/2022 1207  Last data filed at 5/21/2022 1001  Gross per 24 hour   Intake 559 94 ml   Output 875 ml   Net -315 06 ml       Physical Exam:   Physical Exam     Comfortably in bed  Obese  Short thick neck  Lungs diminished breath sounds bilateral  Heart sounds S1 and S2 noted  No murmurs appreciable  Abdomen soft nontender  Abdominal obesity noted  Awake alert obeys simple commands  Moves all extremities  No rash    Additional Data:     Labs:  Results from last 7 days   Lab Units 05/21/22  0507   WBC Thousand/uL 7 55   HEMOGLOBIN g/dL 11 0*   HEMATOCRIT % 34 2*   PLATELETS Thousands/uL 156   NEUTROS PCT % 54   LYMPHS PCT % 30   MONOS PCT % 13*   EOS PCT % 2     Results from last 7 days   Lab Units 05/21/22  0507   SODIUM mmol/L 141   POTASSIUM mmol/L 3 7   CHLORIDE mmol/L 108   CO2 mmol/L 28   BUN mg/dL 10   CREATININE mg/dL 0 88   ANION GAP mmol/L 5   CALCIUM mg/dL 9 2   GLUCOSE RANDOM mg/dL 142*     Results from last 7 days   Lab Units 05/19/22  1925   INR  0 98     Results from last 7 days   Lab Units 05/21/22  1059 05/20/22  2127 05/20/22  1800 05/20/22  1506 05/19/22  2258   POC GLUCOSE mg/dl 212* 138 130 131 132               Lines/Drains:  Invasive Devices  Report    Peripheral Intravenous Line  Duration           Peripheral IV 05/19/22 Left Forearm 1 day    Peripheral IV 05/20/22 Right Forearm 1 day                  Telemetry:  Telemetry Orders (From admission, onward)             48 Hour Telemetry Monitoring  Continuous x 48 hours        References:    Telemetry Guidelines   Question:  Reason for 48 Hour Telemetry  Answer:  Acute MI, chest pain - R/O MI, or unstable angina                 Telemetry Reviewed: Sinus rhythm  Indication for Continued Telemetry Use: Awaiting PCI/EP Study/CABG      Imaging: Reviewed radiology reports from this admission including: chest CT scan and ECHO    Recent Cultures (last 7 days):         Last 24 Hours Medication List:   Current Facility-Administered Medications   Medication Dose Route Frequency Provider Last Rate    acetaminophen  650 mg Oral Q4H PRN Oscar Avila DO      aspirin  81 mg Oral Daily Oscar Avila DO      atorvastatin  40 mg Oral Daily With [employer], DO      benzonatate  100 mg Oral TID Inocencio Du MD      dextromethorphan-guaiFENesin  10 mL Oral Q4H PRN Inocencio Du MD      fluticasone-vilanterol  1 puff Inhalation Daily Oscar Avila DO      furosemide  40 mg Intravenous BID (diuretic) Nickie Phan MD      heparin (porcine)  3-20 Units/kg/hr (Order-Specific) Intravenous Titrated Oscar Avila DO 15 8 Units/kg/hr (05/20/22 1994)    insulin lispro  1-5 Units Subcutaneous TID AC Oscar Avila DO      insulin lispro  1-5 Units Subcutaneous HS Oscar Avila DO      melatonin  3 mg Oral HS Doretha Kayser, CRNP      metoprolol tartrate  25 mg Oral Q12H [facility] Oscar Avila DO      nitroglycerin  0 4 mg Sublingual Q5 Min PRN Oscar Avila DO      ondansetron  4 mg Intravenous Q6H PRN Oscar Avila DO      pantoprazole  40 mg Oral Early Morning Oscar Avila DO          Today, Patient Was Seen By: Inocencio Du MD    **Please Note: This note may have been constructed using a voice recognition system  **

## 2022-05-21 NOTE — PHYSICAL THERAPY NOTE
Physical Therapy Treatment Note       05/21/22 0945   PT Last Visit   PT Visit Date 05/21/22   Note Type   Note type Evaluation   Pain Assessment   Pain Assessment Tool 0-10   Pain Score 3   Pain Location/Orientation Location: Generalized   Patient's Stated Pain Goal No pain   Hospital Pain Intervention(s) Ambulation/increased activity   Restrictions/Precautions   Weight Bearing Precautions Per Order No   Other Precautions Pain; Fall Risk;Multiple lines;Telemetry   Home Living   Type of Home House   Additional Comments Resides w/ son, dtr in multilevel home w/ upstairs setup  Indep self care, ambulates w/ out device  recently retired  Prior Function   Level of [place] Independent with ADLs and functional mobility   Falls in the last 6 months 1 to 4   Cognition   Overall Cognitive Status WFL   Arousal/Participation Responsive   Orientation Level Oriented X4   Memory Unable to assess   Following Commands Follows all commands and directions without difficulty   RLE Assessment   RLE Assessment   (strength grossly 4-/5)   LLE Assessment   LLE Assessment   (strength grossly 4-/5)   Bed Mobility   Supine to Sit 5  Supervision   Additional items Assist x 1; Increased time required   Sit to Supine 5  Supervision   Additional items Assist x 1; Increased time required   Transfers   Sit to Stand   (CGA)   Additional items Assist x 1   Stand to Sit 5  Supervision   Additional items Assist x 1   Ambulation/Elevation   Gait pattern   (slow, mild lateral sway)   Gait Assistance   (CGA)   Additional items Assist x 1   Assistive Device None   Distance 70'x1   Balance   Static Sitting Normal   Dynamic Sitting Good   Static Standing Fair   Dynamic Standing Fair -   Ambulatory Fair -   Endurance Deficit   Endurance Deficit Yes   Endurance Deficit Description fatigue, SOB   Activity Tolerance   Activity Tolerance Patient tolerated treatment well;Patient limited by fatigue;Treatment limited secondary to medical complications (Comment); Patient limited by pain   Nurse Made Aware yes   Assessment   Prognosis Good   Problem List Decreased strength;Decreased endurance; Impaired balance;Decreased mobility;Pain   Assessment Pt seen for physical therapy evaluation  Pt is a 68 y/o female w/ history/comorbidities of CAD, PAD, HTN, DM II< COPD who is now admitted as a transfer from [facility]- seen there w/ chest pain  Undergoing w/u- pt w/ known CAD and transferred for increased level of care  Has been deemed not a surgical candidate in the past   Due to acute medical issues, unclear medical dx, pain, fall risk, note unstable clinical picture  PT consulted to assess mobility, d/c needs  Pt presents w/ decreased functional mob, standing balance, endurance, B LE strength, barriers at home  Pt will benefit from skilled PT to correct for the above problems  When stable, anticipate d/c home w/ no therapy needs  Goals   Patient Goals to feel better   STG Expiration Date 06/04/22   Short Term Goal #1 1-2 wks: bed mob and transfers w/ indep, standing balance to good/normal dynamically, ambulate 200-300 ft w/ indep, increase B LE strength by 1/2 -1 grade, ambulate 1 flight of stairs w/ S   PT Treatment Day 0   Plan   Treatment/Interventions Functional transfer training;LE strengthening/ROM; Elevations; Therapeutic exercise; Endurance training;Equipment eval/education; Bed mobility;Gait training;Patient/family training   PT Frequency 3-5x/wk   Recommendation   PT Discharge Recommendation No rehabilitation needs   AM-PAC Basic Mobility Inpatient   Turning in Bed Without Bedrails 4   Lying on Back to Sitting on Edge of Flat Bed 4   Moving Bed to Chair 3   Standing Up From Chair 3   Walk in Room 3   Climb 3-5 Stairs 3   Basic Mobility Inpatient Raw Score 20   Basic Mobility Standardized Score 43 99   Highest Level Of Mobility   JH-HLM Goal 6: Walk 10 steps or more   JH-HLM Achieved 7: Walk 25 feet or more   Buster Meeks PT, DPT CSRS

## 2022-05-21 NOTE — ASSESSMENT & PLAN NOTE
[facility] 4/15/2022, [facility]:  Multivessel CAD -- 80% ostial RCA, calcified ostial LM stenosis  99% mLAD affecting origin of small D2  Continue aspirin beta-blocker statin

## 2022-05-21 NOTE — ASSESSMENT & PLAN NOTE
· Patient presented the emergency room with complaint of chest pain  · History of known severe triple-vessel disease on cardiac catheterization in 4/15/2022  · Options at that time for percutaneous intervention of CAD would be complicated by the patient's severe peripheral arterial disease disease  As such ultimate decision regarding CABG, PCI, or medical therapy were deferred and to be addressed in the outpatient setting after the patient allowed full recovery from her acute illness    · Patient follows with [facility]  · Elevated troponin ED  · CTA negative for PE  · Placed on heparin drip in the ED, continue  · Hold Eliquis while on heparin drip  · Continue aspirin, beta-blocker and statin  · Patient transferred to [facility] for [facility] surgery vs  High risk PCI  · Cardiology following

## 2022-05-21 NOTE — CASE MANAGEMENT
Case Management Assessment & Discharge Planning Note    Patient name Caroline Mccauley  Location 2 213/2 213-02 MRN [id number]  : 1950 Date 2022       Current Admission Date: 2022  Current Admission Diagnosis:Chest pain   Patient Active Problem List    Diagnosis Date Noted    Obese 2022    CAD (coronary artery disease) 2022    Lung nodule 2022    Chest pain 2022    COPD without exacerbation ([facility] ) 2022    Viral pneumonia 2022    Elevated troponin level not due to acute coronary syndrome 2022    Hypertension 2022    Chronic combined systolic and diastolic heart failure ([facility] ) 2022    Carotid stenosis, asymptomatic, bilateral 2020    Stenosis of left subclavian artery ([facility] ) 2020    Aortoiliac occlusive disease ([facility] ) 2020    Diabetes mellitus ([facility] ) 10/29/2019    Type 2 diabetes mellitus without complication, without long-term current use of insulin [facility] 2019    Primary osteoarthritis of left knee 2017    Arthritis of left knee 2016    Aortoiliac stenosis ([facility] ) 10/05/2015    Renal artery stenosis (HCC) 10/05/2015    Atherosclerosis of native artery of extremity with intermittent claudication ([facility] ) 2014    Stenosis of carotid artery 2014      LOS (days): 1  Geometric Mean LOS (GMLOS) (days):   Days to GMLOS:     OBJECTIVE:  PATIENT READMITTED TO HOSPITAL  Risk of Unplanned Readmission Score: 10 66         Current admission status: Inpatient       Preferred Pharmacy:   [facility] [place] [place] - [address]  [address] [facility] [address]  Phone: [phone] Fax: [phone]    Primary Care Provider: Larry Lackey MD    Primary Insurance: [facility]  Secondary Insurance:     ASSESSMENT:  Alek Andrew Proxies    There are no active Health Care Proxies on file         Advance Directives  Does patient have a Health Care POA?: No  Does patient have Advance Directives?: No  Primary Contact: states she is in process of completing              Patient Information  Admitted from[de-identified] Facility  Mental Status: Alert  During Assessment patient was accompanied by: Not accompanied during assessment  Assessment information provided by[de-identified] Patient  Primary Caregiver: Self  Support Systems: Daughter, Family members  Home entry access options   Select all that apply : Stairs  Number of steps to enter home : 2  Type of Current Residence: 2 story home  Upon entering residence, is there a bedroom on the main floor (no further steps)?: No  A bedroom is located on the following floor levels of residence (select all that apply):: 2nd Floor  Upon entering residence, is there a bathroom on the main floor (no further steps)?: No  Indicate which floors of current residence have a bathroom (select all the apply):: 2nd Floor  Number of steps to 2nd floor from main floor: One Flight  In the last 12 months, was there a time when you were not able to pay the mortgage or rent on time?: No  In the last 12 months, was there a time when you did not have a steady place to sleep or slept in a shelter (including now)?: No  Homeless/housing insecurity resource given?: N/A  Living Arrangements: Lives w/ Daughter, Lives w/ Extended Family    Activities of Daily Living Prior to Admission  Functional Status: Independent  Completes ADLs independently?: Yes  Ambulates independently?: Yes  Does patient use assisted devices?: Yes  Assisted Devices (DME) used: Rollator  Does patient currently own DME?: Yes  What DME does the patient currently own?: Michelle Gold, Bedside [employer]  Does patient have a history of Outpatient Therapy (PT/OT)?: No  Does the patient have a history of Short-Term Rehab?: No  Does patient have a history of HHC?: Yes ([facility] )         Patient Information Continued  Income Source: Pension/shelter  Within the past 12 months, you worried that your food would run out before you got the money to buy more : Never true  Within the past 12 months, the food you bought just didn't last and you didn't have money to get more : Never true  Food insecurity resource given?: N/A  Does patient receive dialysis treatments?: No  Does patient have a history of substance abuse?: No  Does patient have a history of Mental Health Diagnosis?: No         Means of Transportation  Means of Transport to Appts[de-identified] Family transport  In the past 12 months, has lack of transportation kept you from medical appointments or from getting medications?: No  In the past 12 months, has lack of transportation kept you from meetings, work, or from getting things needed for daily living?: No  Was application for public transport provided?: N/A        DISCHARGE DETAILS:    Discharge planning discussed with[de-identified] patient  Freedom of Choice: Yes                   Contacts  Patient Contacts: Debora Lacey (Daughter)  Relationship to Patient[de-identified] Family  Contact Method: Phone  Phone Number: [phone] (H)  Reason/Outcome: Continuity of Care, Discharge Planning, Emergency Contact                   Would you like to participate in our [address] service program?  : No - Declined    CM reviewed d/c planning process including the following: identifying help at home, patient preference for d/c planning needs, Discharge Lounge, [facility] Meds to Bed program, availability of treatment team to discuss questions or concerns patient and/or family may have regarding understanding medications and recognizing signs and symptoms once discharged  CM also encouraged patient to follow up with all recommended appointments after discharge  Patient advised of importance for patient and family to participate in managing patients medical well being  Patient/caregiver received discharge checklist  Content reviewed  Patient/caregiver encouraged to participate in discharge plan of care prior to discharge home

## 2022-05-21 NOTE — CONSULTS
Consultation - General Cardiology Team 2  Mohan Duron 67 y o  female MRN: [id number]  Unit/Bed#: CW2 213-02 Encounter: [id number]      Consults  PCP: Jimmy Gary MD   Outpatient Cardiologist: Dr Yesi Hong [facility])    History of Present Illness   Physician Requesting Consult: Akira Granger MD  Reason for Consult / Principal Problem: chest pain    HPI: Mohan Duron is a 67y o  year old female with a history of DM2, PAD s/p remote stening, carotid artery stenosis, HTN, HLD, stroke (April 2020) and DVT on Apixaban who presents with 1 week of worsening chest pain and shortness of breath  She reported her symptoms initially began 1 month ago, with chest pain prompting admission to [facility], where she was treated for pneumonia  Echo there showed EF 45% and apical wall motion abnormality, prompting cardiac catheterization  She was found to have multivessel CAD with 65% oLM occlusion, 8-% ostial RCA, and 99% prox-mid LAD disease  She was in the process of being evaluated for CABG when last week her symptoms of chest pain began to worsen  She presented [facility] where she was noted to have rising troponin with ECG changes prompting transfer to [place]  She currently reports frequent non-productive cough, no fevers or chills and chest pain mostly with coughing or breathing, but she becomes easily fatigued moving around in bed  Review of Systems  Review of system was conducted and was negative except for as stated in the HPI        Historical Information   Past Medical History:   Diagnosis Date    Acid reflux     Carotid artery disease ([facility] )     Diabetes mellitus (HCC)     NIDDM    Edema     bles    Full dentures     History of hepatitis C     History of shingles     healed    Hx of renal calculi     Hyperlipidemia     Hypertension     Kidney stone     Liver disease     Nocturnal leg cramps     OA (osteoarthritis) of knee     left    PVD (peripheral vascular disease) ([facility]     S/P insertion of iliac artery stent     miles    Urinary incontinence     Wears glasses      Past Surgical History:   Procedure Laterality Date    APPENDECTOMY      COLONOSCOPY      ESOPHAGEAL DILATION      ESOPHAGOGASTRODUODENOSCOPY      ILIAC ARTERY STENT Bilateral     KIDNEY STONE SURGERY      VA TOTAL KNEE ARTHROPLASTY Left 3/7/2017    Procedure: ARTHROPLASTY KNEE TOTAL;  Surgeon: Kirk Ahn MD;  Location: [facility] OR;  Service: Orthopedics     Social History     Substance and Sexual Activity   Alcohol Use Never     Social History     Substance and Sexual Activity   Drug Use No     Social History     Tobacco Use   Smoking Status Former Smoker    Quit date: 2001    Years since quittin 4   Smokeless Tobacco Never Used     Family History: non-contributory    Meds/Allergies   Hospital Medications:   Current Facility-Administered Medications   Medication Dose Route Frequency    acetaminophen (TYLENOL) tablet 650 mg  650 mg Oral Q4H PRN    aspirin (ECOTRIN LOW STRENGTH) EC tablet 81 mg  81 mg Oral Daily    atorvastatin (LIPITOR) tablet 40 mg  40 mg Oral Daily With Dinner    benzonatate (TESSALON PERLES) capsule 100 mg  100 mg Oral TID PRN    fluticasone-vilanterol (BREO ELLIPTA) 100-25 mcg/inh inhaler 1 puff  1 puff Inhalation Daily    furosemide (LASIX) injection 40 mg  40 mg Intravenous Daily    guaiFENesin (MUCINEX) 12 hr tablet 600 mg  600 mg Oral Q12H [address]    heparin (porcine) 25,000 units in 0 45% NaCl 250 mL infusion (premix)  3-20 Units/kg/hr (Order-Specific) Intravenous Titrated    insulin lispro (HumaLOG) 100 units/mL subcutaneous injection 1-5 Units  1-5 Units Subcutaneous TID AC    insulin lispro (HumaLOG) 100 units/mL subcutaneous injection 1-5 Units  1-5 Units Subcutaneous HS    melatonin tablet 3 mg  3 mg Oral HS    metoprolol tartrate (LOPRESSOR) tablet 25 mg  25 mg Oral Q12H [address]    nitroglycerin (NITROSTAT) SL tablet 0 4 mg  0 4 mg Sublingual Q5 Min PRN    ondansetron (ZOFRAN) injection 4 mg  4 mg Intravenous Q6H PRN    pantoprazole (PROTONIX) EC tablet 40 mg  40 mg Oral Early Morning     Home Medications:   Medications Prior to Admission   Medication    apixaban (ELIQUIS) 5 mg    aspirin (ECOTRIN LOW STRENGTH) 81 mg EC tablet    atorvastatin (LIPITOR) 10 mg tablet    benzonatate (TESSALON PERLES) 100 mg capsule    cyclobenzaprine (FLEXERIL) 10 mg tablet    fluticasone (FLONASE) 50 mcg/act nasal spray    fluticasone-umeclidinium-vilanterol (Trelegy Ellipta) 100-62 5-25 MCG/INH inhaler    furosemide (Lasix) 40 mg tablet    lansoprazole (PREVACID) 30 mg capsule    meclizine (ANTIVERT) 25 mg tablet    Melatonin 5 MG TABS    meloxicam (MOBIC) 7 5 mg tablet    metFORMIN (GLUCOPHAGE) 850 mg tablet    metoprolol tartrate (LOPRESSOR) 25 mg tablet    montelukast (SINGULAIR) 10 mg tablet    oxyCODONE (ROXICODONE) 5 mg immediate release tablet    RA COUGH DM 30 MG/5ML SUER       Allergies   Allergen Reactions    Codeine GI Intolerance    Compazine [Prochlorperazine] Other (See Comments)     Eyes rolled up in head    Tramadol GI Intolerance       Objective   Vitals: Blood pressure 119/67, pulse 76, temperature 97 8 °F (36 6 °C), resp  rate 18, SpO2 94 %, not currently breastfeeding    Orthostatic Blood Pressures    Flowsheet Row Most Recent Value   Blood Pressure 119/67 filed at 05/21/2022 9554   Patient Position - Orthostatic VS Lying filed at 05/21/2022 0609            Invasive Devices  Report    Peripheral Intravenous Line  Duration           Peripheral IV 05/19/22 Left Forearm 1 day    Peripheral IV 05/20/22 Right Forearm 1 day                Physical Exam  GEN: Katerine Alvarado appears well, alert and oriented x 3, pleasant and cooperative   HEENT:  Normocephalic, atraumatic, anicteric, moist mucous membranes  NECK: No JVD    HEART: normal rhythm, normal rate, normal S1 and S2, no murmurs, clicks, gallops or rubs   LUNGS: Clear to auscultation bilaterally; no wheezes, rales, or rhonchi; respiration nonlabored       Lab Results: I have personally reviewed pertinent lab results  Results from last 7 days   Lab Units 05/19/22  1821   NT-PRO BNP pg/mL 5,497*     Results from last 7 days   Lab Units 05/21/22  0507 05/20/22  0453 05/19/22  1821   POTASSIUM mmol/L 3 7 3 4* 3 3*   CO2 mmol/L 28 24 22   CHLORIDE mmol/L 108 107 107   BUN mg/dL 10 6 7   CREATININE mg/dL 0 88 0 90 1 05     Results from last 7 days   Lab Units 05/21/22  0507 05/20/22  0453 05/19/22  1821   HEMOGLOBIN g/dL 11 0* 12 2 11 8   HEMATOCRIT % 34 2* 38 9 38 8   PLATELETS Thousands/uL 156 204 129*     Results from last 7 days   Lab Units 05/21/22  0507 05/20/22  2200 05/20/22  1553   PTT seconds 81* 46* 71*             Imaging: I have personally reviewed pertinent reports  Telemetry:   Sinus rhythm in 70s, no arrythmia events    EKG:   Date: 5/20  Interpretation: Sinus rhythm with QT prolongation and symmetric TWI in anterolateral leads  Assessment/Plan     NSTEMI (Type I) in setting of known multivessel CAD    Ischemic CM with Acute HFmrEF    DM2    History of stroke    PAD s/p remote stenting    HTN    HLD    72F with recent cardiac workup at [facility] with mildly reduced LV EF and 65% ostial LM, 99% LAD, and 80% ostial RCA disease presented with worsening chest pain with concerning symmetric anterolateral TWI on ECG and troponin elevation 67 -> 137 -> 732 -> 3768  BNP elevated with CT showing worsening pulmonary edema  Images from [facility] not available to independently evaluate coronary anatomy  She has risks for catheterization including extensive aortic atherosclerosis, extensive PAD history, and brachiocephalic artery stenosis which was noted on prior catheterization  Continue ASA 81mg daily, IV heparin, statin, and beta blocker therapy  Continue Furosemide IV 40mg BID for now with close monitoring of output and intake       Discussed the benefits of surgical revascularization over stenting given diabetic MV CAD with the patient and her daughter  They agree to obtain a surgical opinion from CT surgery here at [place] and undergo necessary testing for surgical planning  This should be done over the weekend       If turned down for surgery, will discuss options for PCI with interventional cardiology team      Harman Vallejo MD  Cardiology Fellow

## 2022-05-21 NOTE — UTILIZATION REVIEW
Initial Clinical Review    Admission: Date/Time/Statement:   Admission Orders (From admission, onward)     Ordered        05/20/22 1720  Inpatient Admission  Once                      Orders Placed This Encounter   Procedures    Inpatient Admission     Standing Status:   Standing     Number of Occurrences:   1     Order Specific Question:   Level of Care     Answer:   Med Surg [16]     Order Specific Question:   Estimated length of stay     Answer:   More than 2 Midnights     Order Specific Question:   Certification     Answer:   I certify that inpatient services are medically necessary for this patient for a duration of greater than two midnights  See H&P and MD Progress Notes for additional information about the patient's course of treatment  Initial Presentation: 67 y o  female PMH of CAD, COPD peripheral arterial disease, hypertension and diabetes mellitus type 2 who presents with chest pain  Patient was evaluated in the emergency room found to have elevated troponin and BNP  EKG showed nonspecific T-wave inversion without ST segment shift  She was started on heparin drip  Decision was made to transfer patient to [facility] for complex PCI versus CT surgery evaluation  Admitted Inpatient Chest Pain with Known history of severe triple -vessel disease on cardiac cathetetization in 4/15/22  Option at that time intervention deferred till allowed full recover from her acute illness  Address as outpatient  Pt follows with [facility]  Elevated troponin, CTA neg for PE, cardiology consulted placed on heparin gtt, hold eliquis   Continue asa BB and statin  Evaluation SL[facility] for CT surgery vs High risk PCI  CAD [facility] 4/15/22 ; [facility]:Multivessel CAD -- 80% ostial RCA, calcified ostial LM stenosis  99% mLAD affecting origin of small D2  Combined systolic and diastolic heart failure   Cardiac CT scan with pulmonary edema, continue wqith IV Lasix  I/o weights  Aortoiliac occlusive disease wit b/l iliac stents f/u outpatient with vascular surgery continue asa statin  DM SSI  CARDIOLOGY CONSULT  Plan   NSTEMI type 1, multivessel CAD   No acute chest pain   Continue aspirin, metoprolol, heparin drip per ACS   She has diabetes and has severe calcific ostial left main, mid LAD and ostial RCA lesions, and would benefit from CABG evaluation   Unfortunately no images available for review   Will consult CT surgery for further evaluation for considerations regarding candidacy for CABG  v PCI  Acute on chronic combined heart failure, LVEF 45%   appears mildly volume overloaded   Diuresing well with IV Lasix 40 yesterday   Continue IV Lasix 40 mg b i d       CARDIAC SURGERY CONSULT:  Risks and benefits of coronary artery bypass grafting were discussed in detail today with the patient  They understand and wish to proceed with further workup and ultimately surgical intervention        With regard to his vein mapping, the report demonstrates numbers of borderline suitability  As images are unavailable for review, I will repeat a study during this admission  Following this study, final recommendation regarding feasibility of surgical revascularization will be made  Date: 5/21/22   Day 2:   CARDIOLOGY CONSULT  Plan   NSTEMI type 1, multivessel CAD   No acute chest pain   Continue aspirin, metoprolol, heparin drip per ACS   She has diabetes and has severe calcific ostial left main, mid LAD and ostial RCA lesions, and would benefit from CABG evaluation   Unfortunately no images available for review   Will consult CT surgery for further evaluation for considerations regarding candidacy for CABG  v PCI  Acute on chronic combined heart failure, LVEF 45%   appears mildly volume overloaded   Diuresing well with IV Lasix 40 yesterday   Continue IV Lasix 40 mg b i d       CARDIAC SURGERY CONSULT:  Risks and benefits of coronary artery bypass grafting were discussed in detail today with the patient    They understand and wish to proceed with further workup and ultimately surgical intervention        With regard to his vein mapping, the report demonstrates numbers of borderline suitability  As images are unavailable for review, I will repeat a study during this admission  Following this study, final recommendation regarding feasibility of surgical revascularization will be made  5/23/22  Per Cardiology No significant arrhythmias seen on telemetry review  NSR  CT surgery consulted yesterday and ongoing preoperative evaluation  Continue aspirin, metoprolol, heparin drip  CHF continue IV Lasix bid , bmp        ED Triage Vitals   Temperature Pulse Respirations Blood Pressure SpO2   05/20/22 1756 05/20/22 1756 05/20/22 1756 05/20/22 1756 05/20/22 1756   97 8 °F (36 6 °C) 86 17 106/57 96 %      Temp Source Heart Rate Source Patient Position - Orthostatic VS BP Location FiO2 (%)   05/21/22 0609 -- 05/21/22 0609 05/21/22 0609 --   Oral  Lying Right leg       Pain Score       05/20/22 2100       No Pain          Wt Readings from Last 1 Encounters:   05/19/22 87 8 kg (193 lb 9 oz)     Additional Vital Signs:   05/21/22 0756 -- 76 -- 119/67 84 95 % 26 1 5 L/min Nasal cannula --   05/21/22 07:55:32 -- 76 -- 119/67 84 95 % -- -- -- --   05/21/22 07:26:27 97 8 °F (36 6 °C) -- -- 84/54 Abnormal  64 -- -- -- -- --   05/21/22 0609 98 °F (36 7 °C) -- -- 120/69 -- -- -- -- -- Lying   05/20/22 19:54:19 -- 82 18 124/74 91            Pertinent Labs/Diagnostic Test Results:   5/20/22 CXR Congestive changes appear improved  5/19/22 CTA PE study    No evidence of pulmonary embolism  2   Pulmonary edema and mild dependent atelectasis   Interval resolution of small bilateral pleural effusions  3   5 mm nodule in the left lower lobe  Based on current Fleischner Society 2017 Guidelines on incidental pulmonary nodule, no routine follow-up is needed if the patient is low risk   If the patient is high risk, optional follow-up chest CT at 12 months   can be considered    Results from last 7 days   Lab Units 05/20/22  1304   SARS-COV-2  Negative     Results from last 7 days   Lab Units 05/21/22  0507 05/20/22  0453 05/19/22  1821   WBC Thousand/uL 7 55 8 69 6 65   HEMOGLOBIN g/dL 11 0* 12 2 11 8   HEMATOCRIT % 34 2* 38 9 38 8   PLATELETS Thousands/uL 156 204 129*   NEUTROS ABS Thousands/µL 4 09 4 51 3 90     Results from last 7 days   Lab Units 05/21/22  0507 05/20/22  0453 05/19/22  1821   SODIUM mmol/L 141 142 141   POTASSIUM mmol/L 3 7 3 4* 3 3*   CHLORIDE mmol/L 108 107 107   CO2 mmol/L 28 24 22   ANION GAP mmol/L 5 11 12   BUN mg/dL 10 6 7   CREATININE mg/dL 0 88 0 90 1 05   EGFR ml/min/1 73sq m 65 63 52   CALCIUM mg/dL 9 2 8 5 8 9     Results from last 7 days   Lab Units 05/22/22  0624 05/21/22  2135 05/21/22  1645 05/21/22  1611 05/21/22  1059 05/20/22  2127 05/20/22  1800 05/20/22  1506 05/19/22  2258   POC GLUCOSE mg/dl 128 156* 161* >500* 212* 138 130 131 132     Results from last 7 days   Lab Units 05/21/22  0507 05/20/22  0453 05/19/22  1821   GLUCOSE RANDOM mg/dL 142* 127 140     Results from last 7 days   Lab Units 05/19/22  2245 05/19/22  2030 05/19/22  1821   HS TNI 0HR ng/L  --   --  67*   HS TNI 2HR ng/L  --  482*  --    HSTNI D2 ng/L  --  415*  --    HS TNI 4HR ng/L 732*  --   --    HSTNI D4 ng/L 665*  --   --      Results from last 7 days   Lab Units 05/22/22  0515 05/21/22  1139 05/21/22  0507 05/20/22  0138 05/19/22  1925   PROTIME seconds  --   --   --   --  12 7   INR   --   --   --   --  0 98   PTT seconds 53* 68* 81*   < > 21*    < > = values in this interval not displayed       Results from last 7 days   Lab Units 05/19/22  1821   NT-PRO BNP pg/mL 5,497*     Results from last 7 days   Lab Units 05/20/22  1304   INFLUENZA A PCR  Negative   INFLUENZA B PCR  Negative   RSV PCR  Negative       Past Medical History:   Diagnosis Date    Acid reflux     Carotid artery disease (HCC)     Diabetes mellitus (HCC)     NIDDM    Edema     bles    Full dentures     History of hepatitis C     History of shingles     healed    Hx of renal calculi     Hyperlipidemia     Hypertension     Kidney stone     Liver disease     Nocturnal leg cramps     OA (osteoarthritis) of knee     left    PVD (peripheral vascular disease) [facility]     S/P insertion of iliac artery stent     miles    Urinary incontinence     Wears glasses      Present on Admission:   Chest pain   Chronic combined systolic and diastolic heart failure (HCC)   Hypertension   Diabetes mellitus (HCC)   Aortoiliac occlusive disease ([facility] )      Admitting Diagnosis: Chest pain [R07 9]  Age/Sex: 67 y o  female  Admission Orders:  Telemetry  aptt  IS  I/O  Microalbmin/creatinine urine ration  Hemoglobin A1C  Lipid panel  Scheduled Medications:  aspirin, 81 mg, Oral, Daily  atorvastatin, 40 mg, Oral, Daily With Dinner  benzonatate, 100 mg, Oral, TID  fluticasone-vilanterol, 1 puff, Inhalation, Daily  furosemide, 40 mg, Intravenous, BID (diuretic)  insulin lispro, 1-5 Units, Subcutaneous, TID AC  insulin lispro, 1-5 Units, Subcutaneous, HS  levalbuterol, 1 25 mg, Nebulization, BID  melatonin, 3 mg, Oral, HS  metoprolol tartrate, 25 mg, Oral, Q12H THERESA  pantoprazole, 40 mg, Oral, Early Morning      Continuous IV Infusions:  heparin (porcine), 3-20 Units/kg/hr (Order-Specific), Intravenous, Titrated      PRN Meds:  acetaminophen, 650 mg, Oral, Q4H PRN  albuterol, 2 puff, Inhalation, Q4H PRN  dextromethorphan-guaiFENesin, 10 mL, Oral, Q4H PRN  nitroglycerin, 0 4 mg, Sublingual, Q5 Min PRN  ondansetron, 4 mg, Intravenous, Q6H PRN        IP CONSULT TO CARDIOLOGY  IP CONSULT TO CARDIOTHORACIC SURGERY    Network Utilization Review Department  ATTENTION: Please call with any questions or concerns to [phone] and carefully listen to the prompts so that you are directed to the right person   All voicemails are confidential   Reina Rolle all requests for admission clinical reviews, approved or denied determinations and any other requests to dedicated fax number below belonging to the campus where the patient is receiving treatment   List of dedicated fax numbers for the Facilities:  [address] [place] [address] DENIALS (Administrative/Medical Necessity) [phone]   [address]  (Maternity/NICU/Pediatrics) [phone]   [address] [place] [address] [place] [facility] [address] [address] [facility] [place] [address] [phone]

## 2022-05-21 NOTE — PLAN OF CARE
Problem: Potential for Falls  Goal: Patient will remain free of falls  Description: INTERVENTIONS:  - Educate patient/family on patient safety including physical limitations  - Instruct patient to call for assistance with activity   - Consult OT/PT to assist with strengthening/mobility   - Keep Call bell within reach  - Keep bed low and locked with side rails adjusted as appropriate  - Keep care items and personal belongings within reach  - Initiate and maintain comfort rounds  - Make Fall Risk Sign visible to staff  - Offer Toileting every 2 Hours, in advance of need  - Initiate/Maintain alarm  - Obtain necessary fall risk management equipment  - Apply yellow socks and bracelet for high fall risk patients  - Consider moving patient to room near nurses station  Outcome: Progressing     Problem: PAIN - ADULT  Goal: Verbalizes/displays adequate comfort level or baseline comfort level  Description: Interventions:  - Encourage patient to monitor pain and request assistance  - Assess pain using appropriate pain scale  - Administer analgesics based on type and severity of pain and evaluate response  - Implement non-pharmacological measures as appropriate and evaluate response  - Consider cultural and social influences on pain and pain management  - Notify physician/advanced practitioner if interventions unsuccessful or patient reports new pain  Outcome: Progressing     Problem: INFECTION - ADULT  Goal: Absence or prevention of progression during hospitalization  Description: INTERVENTIONS:  - Assess and monitor for signs and symptoms of infection  - Monitor lab/diagnostic results  - Monitor all insertion sites, i e  indwelling lines, tubes, and drains  - Monitor endotracheal if appropriate and nasal secretions for changes in amount and color  - Witt appropriate cooling/warming therapies per order  - Administer medications as ordered  - Instruct and encourage patient and family to use good hand hygiene technique  - Identify and instruct in appropriate isolation precautions for identified infection/condition  Outcome: Progressing  Goal: Absence of fever/infection during neutropenic period  Description: INTERVENTIONS:  - Monitor WBC    Outcome: Progressing     Problem: SAFETY ADULT  Goal: Maintain or return to baseline ADL function  Description: INTERVENTIONS:  -  Assess patient's ability to carry out ADLs; assess patient's baseline for ADL function and identify physical deficits which impact ability to perform ADLs (bathing, care of mouth/teeth, toileting, grooming, dressing, etc )  - Assess/evaluate cause of self-care deficits   - Assess range of motion  - Assess patient's mobility; develop plan if impaired  - Assess patient's need for assistive devices and provide as appropriate  - Encourage maximum independence but intervene and supervise when necessary  - Involve family in performance of ADLs  - Assess for home care needs following discharge   - Consider OT consult to assist with ADL evaluation and planning for discharge  - Provide patient education as appropriate  Outcome: Progressing  Goal: Maintains/Returns to pre admission functional level  Description: INTERVENTIONS:  - Perform BMAT or MOVE assessment daily    - Set and communicate daily mobility goal to care team and patient/family/caregiver  - Collaborate with rehabilitation services on mobility goals if consulted  - Perform Range of Motion 3 times a day  - Reposition patient every 2 hours    - Dangle patient 3 times a day  - Stand patient 3 times a day  - Ambulate patient 3 times a day  - Out of bed to chair 3 times a day   - Out of bed for meals 3 times a day  - Out of bed for toileting  - Record patient progress and toleration of activity level   Outcome: Progressing     Problem: DISCHARGE PLANNING  Goal: Discharge to home or other facility with appropriate resources  Description: INTERVENTIONS:  - Identify barriers to discharge w/patient and caregiver  - Arrange for needed discharge resources and transportation as appropriate  - Identify discharge learning needs (meds, wound care, etc )  - Arrange for interpretive services to assist at discharge as needed  - Refer to Case Management Department for coordinating discharge planning if the patient needs post-hospital services based on physician/advanced practitioner order or complex needs related to functional status, cognitive ability, or social support system  Outcome: Progressing     Problem: Knowledge Deficit  Goal: Patient/family/caregiver demonstrates understanding of disease process, treatment plan, medications, and discharge instructions  Description: Complete learning assessment and assess knowledge base    Interventions:  - Provide teaching at level of understanding  - Provide teaching via preferred learning methods  Outcome: Progressing

## 2022-05-21 NOTE — ASSESSMENT & PLAN NOTE
Wt Readings from Last 3 Encounters:   05/19/22 87 8 kg (193 lb 9 oz)   05/10/22 86 4 kg (190 lb 7 6 oz)   03/29/21 88 9 kg (196 lb)     2D echo EF 45%  Continue Lasix per cardiology  Beta-blocker  Monitor I/O, daily weights  Less than 2 g salt diet fluid restriction  Cardiology following

## 2022-05-22 ENCOUNTER — APPOINTMENT (INPATIENT)
Dept: NON INVASIVE DIAGNOSTICS | Facility: HOSPITAL | Age: 72
DRG: 246 | End: 2022-05-22
Payer: COMMERCIAL

## 2022-05-22 PROBLEM — I21.4 TYPE 1 NON-ST ELEVATION MYOCARDIAL INFARCTION (NSTEMI) (HCC): Status: ACTIVE | Noted: 2022-05-19

## 2022-05-22 LAB
APTT PPP: 53 SECONDS (ref 23–37)
APTT PPP: 73 SECONDS (ref 23–37)
GLUCOSE SERPL-MCNC: 128 MG/DL (ref 65–140)
GLUCOSE SERPL-MCNC: 149 MG/DL (ref 65–140)
GLUCOSE SERPL-MCNC: 162 MG/DL (ref 65–140)
GLUCOSE SERPL-MCNC: 164 MG/DL (ref 65–140)

## 2022-05-22 PROCEDURE — 85730 THROMBOPLASTIN TIME PARTIAL: CPT | Performed by: INTERNAL MEDICINE

## 2022-05-22 PROCEDURE — 94760 N-INVAS EAR/PLS OXIMETRY 1: CPT

## 2022-05-22 PROCEDURE — 94640 AIRWAY INHALATION TREATMENT: CPT

## 2022-05-22 PROCEDURE — 93970 EXTREMITY STUDY: CPT

## 2022-05-22 PROCEDURE — 93925 LOWER EXTREMITY STUDY: CPT

## 2022-05-22 PROCEDURE — 82948 REAGENT STRIP/BLOOD GLUCOSE: CPT

## 2022-05-22 PROCEDURE — 99232 SBSQ HOSP IP/OBS MODERATE 35: CPT | Performed by: INTERNAL MEDICINE

## 2022-05-22 PROCEDURE — 93923 UPR/LXTR ART STDY 3+ LVLS: CPT

## 2022-05-22 PROCEDURE — 93925 LOWER EXTREMITY STUDY: CPT | Performed by: SURGERY

## 2022-05-22 PROCEDURE — 85730 THROMBOPLASTIN TIME PARTIAL: CPT | Performed by: GENERAL PRACTICE

## 2022-05-22 RX ORDER — CHLORHEXIDINE GLUCONATE 0.12 MG/ML
15 RINSE ORAL ONCE
Status: DISCONTINUED | OUTPATIENT
Start: 2022-05-23 | End: 2022-05-26 | Stop reason: HOSPADM

## 2022-05-22 RX ADMIN — Medication 3 MG: at 22:17

## 2022-05-22 RX ADMIN — BENZONATATE 100 MG: 100 CAPSULE ORAL at 08:10

## 2022-05-22 RX ADMIN — LEVALBUTEROL HYDROCHLORIDE 1.25 MG: 1.25 SOLUTION RESPIRATORY (INHALATION) at 21:58

## 2022-05-22 RX ADMIN — BENZONATATE 100 MG: 100 CAPSULE ORAL at 22:17

## 2022-05-22 RX ADMIN — FUROSEMIDE 40 MG: 10 INJECTION, SOLUTION INTRAMUSCULAR; INTRAVENOUS at 18:14

## 2022-05-22 RX ADMIN — HEPARIN SODIUM 17 UNITS/KG/HR: 10000 INJECTION, SOLUTION INTRAVENOUS at 10:39

## 2022-05-22 RX ADMIN — ATORVASTATIN CALCIUM 40 MG: 40 TABLET, FILM COATED ORAL at 18:14

## 2022-05-22 RX ADMIN — PANTOPRAZOLE SODIUM 40 MG: 40 TABLET, DELAYED RELEASE ORAL at 05:17

## 2022-05-22 RX ADMIN — BENZONATATE 100 MG: 100 CAPSULE ORAL at 18:14

## 2022-05-22 RX ADMIN — LEVALBUTEROL HYDROCHLORIDE 1.25 MG: 1.25 SOLUTION RESPIRATORY (INHALATION) at 12:00

## 2022-05-22 RX ADMIN — FLUTICASONE FUROATE AND VILANTEROL TRIFENATATE 1 PUFF: 100; 25 POWDER RESPIRATORY (INHALATION) at 08:14

## 2022-05-22 RX ADMIN — METOPROLOL TARTRATE 25 MG: 25 TABLET, FILM COATED ORAL at 22:17

## 2022-05-22 RX ADMIN — ASPIRIN 81 MG: 81 TABLET, COATED ORAL at 08:07

## 2022-05-22 NOTE — ASSESSMENT & PLAN NOTE
· Patient presented the emergency room with complaint of chest pain  · History of known severe triple-vessel disease on cardiac catheterization in 4/15/2022  · Presently on IV heparin GTT  · Continue aspirin, beta-blocker and statin  · CT surgery plans for CABG noted, workup underway  · Cardiology following

## 2022-05-22 NOTE — PROGRESS NOTES
[address] [facility]  Progress Note - Abraham Han [birth year], 67 y o  female MRN: [id number]  Unit/Bed#: CW2 213-02 Encounter: [id number]  Primary Care Provider: Monse Johnson MD   Date and time admitted to hospital: 5/20/2022  5:15 PM    * Type 1 non-ST elevation myocardial infarction (NSTEMI) [facility])  Assessment & Plan  · Patient presented the emergency room with complaint of chest pain  · History of known severe triple-vessel disease on cardiac catheterization in 4/15/2022  · Presently on IV heparin GTT  · Continue aspirin, beta-blocker and statin  · CT surgery plans for CABG noted, workup underway  · Cardiology following    Chronic combined systolic and diastolic heart failure ([facility] )  Assessment & Plan  Wt Readings from Last 3 Encounters:   05/19/22 87 8 kg (193 lb 9 oz)   05/10/22 86 4 kg (190 lb 7 6 oz)   03/29/21 88 9 kg (196 lb)     2D echo EF 45%  Continue Lasix per cardiology  Beta-blocker  Monitor I/O, daily weights  Less than 2 g salt diet fluid restriction  Cardiology following          Obese  Assessment & Plan  Therapeutic lifestyle modification encouraged  Outpatient sleep study recommended    Lung nodule  Assessment & Plan  Incidental finding on CT scan  Repeat chest CT scan in 12 months    CAD (coronary artery disease)  Assessment & Plan  [facility] 4/15/2022, [facility]:  Multivessel CAD -- 80% ostial RCA, calcified ostial LM stenosis  99% mLAD affecting origin of small D2  Continue aspirin beta-blocker statin    Hypertension  Assessment & Plan  Monitor blood pressures  Avoid hypotension    Aortoiliac occlusive disease ([facility] )  Assessment & Plan  · Patient with known aortoiliac occlusive disease with bilateral iliac stents  · Follows outpatient with vascular surgery  · Continue aspirin and statin      Diabetes mellitus [facility])  Assessment & Plan  Lab Results   Component Value Date    HGBA1C 6 3 (H) 05/21/2022       Recent Labs     05/21/22  1645 05/21/22  2135 05/22/22  9480 22  1103   POCGLU 161* 156* 128 164*       Blood Sugar Average: Last 72 hrs:  · (P) 155 [id number]     · Metformin on hold while inpatient  · Continue insulin therapy  · Titrate insulin dose based on Accu-Cheks  · Avoid hypoglycemia  · Hypoglycemia protocol in place              VTE Pharmacologic Prophylaxis: VTE Score: 4 Moderate Risk (Score 3-4) - Pharmacological DVT Prophylaxis Ordered: heparin drip  Patient Centered Rounds: I performed bedside rounds with nursing staff today  Discussions with Specialists or Other Care Team Provider:     Education and Discussions with Family / Patient: Discussed the patient, reports she is keeping her family updated  Time Spent for Care: 30 minutes  More than 50% of total time spent on counseling and coordination of care as described above  Current Length of Stay: 2 day(s)  Current Patient Status: Inpatient   Certification Statement: The patient will continue to require additional inpatient hospital stay due to As outlined  Discharge Plan: CT surgery plans for CABG noted    Code Status: Level 1 - Full Code    Subjective:     Comfortably in bed  Denies chest pain shortness of breath  Encouraged out of bed into chair  Encouraged incentive spirometry    Objective:     Vitals:   Temp (24hrs), Av °F (36 7 °C), Min:97 9 °F (36 6 °C), Max:98 1 °F (36 7 °C)    Temp:  [97 9 °F (36 6 °C)-98 1 °F (36 7 °C)] 97 9 °F (36 6 °C)  HR:  [68-79] 73  Resp:  [16] 16  BP: ()/(55-69) 105/66  SpO2:  [90 %-94 %] 90 %  There is no height or weight on file to calculate BMI  Input and Output Summary (last 24 hours):      Intake/Output Summary (Last 24 hours) at 2022 1132  Last data filed at 2022 1818  Gross per 24 hour   Intake 231 ml   Output 275 ml   Net -44 ml       Physical Exam:   Physical Exam     Comfortably in bed  Obese  Short thick neck  Lungs diminished breath sounds bases  No additional sounds  Heart sounds S1 and S2 noted  No murmurs appreciable  Abdomen soft nontender  Awake alert obeys simple commands  No pedal edema  No rash    Additional Data:     Labs:  Results from last 7 days   Lab Units 05/21/22  0507   WBC Thousand/uL 7 55   HEMOGLOBIN g/dL 11 0*   HEMATOCRIT % 34 2*   PLATELETS Thousands/uL 156   NEUTROS PCT % 54   LYMPHS PCT % 30   MONOS PCT % 13*   EOS PCT % 2     Results from last 7 days   Lab Units 05/21/22  0507   SODIUM mmol/L 141   POTASSIUM mmol/L 3 7   CHLORIDE mmol/L 108   CO2 mmol/L 28   BUN mg/dL 10   CREATININE mg/dL 0 88   ANION GAP mmol/L 5   CALCIUM mg/dL 9 2   GLUCOSE RANDOM mg/dL 142*     Results from last 7 days   Lab Units 05/19/22  1925   INR  0 98     Results from last 7 days   Lab Units 05/22/22  1103 05/22/22  0624 05/21/22  2135 05/21/22  1645 05/21/22  1611 05/21/22  1059 05/20/22  2127 05/20/22  1800 05/20/22  1506 05/19/22  2258   POC GLUCOSE mg/dl 164* 128 156* 161* >500* 212* 138 130 131 132     Results from last 7 days   Lab Units 05/21/22  0507   HEMOGLOBIN A1C % 6 3*           Lines/Drains:  Invasive Devices  Report    Peripheral Intravenous Line  Duration           Peripheral IV 05/19/22 Left Forearm 2 days    Peripheral IV 05/20/22 Right Forearm 2 days                  Telemetry:  Telemetry Orders (From admission, onward)             48 Hour Telemetry Monitoring  Continuous x 48 hours        References:    Telemetry Guidelines   Question:  Reason for 48 Hour Telemetry  Answer:  Cardiac Surgery                 Telemetry Reviewed: Sinus rhythm  Indication for Continued Telemetry Use: Awaiting PCI/EP Study/CABG             Imaging: Reviewed radiology reports from this admission including: chest CT scan and ECHO    Recent Cultures (last 7 days):         Last 24 Hours Medication List:   Current Facility-Administered Medications   Medication Dose Route Frequency Provider Last Rate    acetaminophen  650 mg Oral Q4H PRN Dmitry Ag, DO      albuterol  2 puff Inhalation Q4H PRN Regina Clark MD  aspirin  81 mg Oral Daily Rice Calkin, DO      atorvastatin  40 mg Oral Daily With [employer], DO      benzonatate  100 mg Oral TID Phillip Castorena MD     Zion Randolphight ON 5/23/2022] chlorhexidine  15 mL Mouth/Throat Once Troy Delgado PA-C      dextromethorphan-guaiFENesin  10 mL Oral Q4H PRN Phillip Castorena MD      fluticasone-vilanterol  1 puff Inhalation Daily Rice Calkin, DO      furosemide  40 mg Intravenous BID (diuretic) Pablo Dumont MD      heparin (porcine)  3-20 Units/kg/hr (Order-Specific) Intravenous Titrated Rice Calkin, DO 17 Units/kg/hr (05/22/22 1039)    insulin lispro  1-5 Units Subcutaneous TID AC Rice Calkin, DO      insulin lispro  1-5 Units Subcutaneous HS Rice Calkin, DO      levalbuterol  1 25 mg Nebulization BID Phillip Castorena MD      melatonin  3 mg Oral HS NATALIA Dahl      metoprolol tartrate  25 mg Oral Q12H [facility] Rice Calkin, DO      [START ON 5/23/2022] mupirocin  1 application Nasal Once Troy Delgado PA-C      nitroglycerin  0 4 mg Sublingual Q5 Min PRN Rice Calkin, DO      ondansetron  4 mg Intravenous Q6H PRN Rice Calkin, DO      pantoprazole  40 mg Oral Early Morning Rice Calkin, DO          Today, Patient Was Seen By: Phillip Castorena MD    **Please Note: This note may have been constructed using a voice recognition system  **

## 2022-05-22 NOTE — ASSESSMENT & PLAN NOTE
Lab Results   Component Value Date    HGBA1C 6 3 (H) 05/21/2022       Recent Labs     05/21/22  1645 05/21/22  2135 05/22/22  0624 05/22/22  1103   POCGLU 161* 156* 128 164*       Blood Sugar Average: Last 72 hrs:  · (P) 155 [id number]     · Metformin on hold while inpatient  · Continue insulin therapy  · Titrate insulin dose based on Accu-Cheks  · Avoid hypoglycemia  · Hypoglycemia protocol in place

## 2022-05-22 NOTE — PROGRESS NOTES
[facility]    Cardiology Progress Note  Jolly Mcclellan 67 y o  female   YOB: 1950 MRN: [id number]  Unit/Bed#: CW2 213-02 Encounter: [id number]      Subjective:   No significant events overnight  No current complains of chest pain or shortness of breath  She is getting preoperative cardiac testing for potential CABG  Assessments  76-year-old female with history of hypertension, hyperlipidemia, peripheral vascular disease status post prior intervention, carotid artery stenosis, history of stroke/DVT, had recently presented to [facility] with complains of chest pain  She was apparently treated for pneumonia but underwent cardiac catheterization as well, and was found to have multivessel calcific coronary artery disease involving left main, LAD, RCA  She was recommended PCI versus CABG evaluation and was undergoing peripheral vascular testing for the same  But apparently due to the ongoing nationwide contrast shortage, some of her outpatient testing was cancelled  In the meantime she had worsening of symptoms of chest pain and shortness of breath and presented back to [facility] yesterday  At [facility], she ruled in for acute coronary syndrome with the rising troponin and with her recent cath finding she was transferred to [facility] for CTS/interventional evaluation    She has been on heparin drip since arrival and is feeling better today        Principal Problem:    Chest pain  Active Problems:    Diabetes mellitus (HCC)    Aortoiliac occlusive disease (HCC)    Hypertension    Chronic combined systolic and diastolic heart failure (HCC)    CAD (coronary artery disease)    Lung nodule    Obese    Recent [facility] - 4/20/2022 [facility]) - severe calcific ostial Left Main disease, mLAD 99% (moderately calcified), oRCA 80% (severe, calcific & tortuous), D1 55%, LCx 50%      Plan  NSTEMI type 1, multivessel CAD   · No acute chest pain · [facility] with multi-vessel, calcific CAD, including ostial Left main and mLAD  · She has diabetes and has severe calcific ostial left main, mid LAD and ostial RCA lesions, and would benefit from CABG evaluation   · CT surgery consulted yesterday and ongoing preoperative evaluation  · Continue aspirin, metoprolol, heparin drip      Acute on chronic combined heart failure, LVEF 45%   · appears mildly volume overloaded   · Diuresing well with IV Lasix 40 yesterday   · Continue IV Lasix 40 mg b i d  · BMP pending this morning    Review of Systems   All other systems reviewed and are negative  Telemetry Review: No significant arrhythmias seen on telemetry review  NSR    Objective:   Vitals: Blood pressure 106/65, pulse 73, temperature 97 9 °F (36 6 °C), resp  rate 16, SpO2 90 %, not currently breastfeeding  , There is no height or weight on file to calculate BMI ,   Orthostatic Blood Pressures    Flowsheet Row Most Recent Value   Blood Pressure 106/65 filed at 05/22/2022 0808   Patient Position - Orthostatic VS Lying filed at 05/21/2022 7543         Systolic (04IZC), PAX:060 , Min:90 , GRAYSON:265     Diastolic (94EQC), RFB:40, Min:55, Max:69    Wt Readings from Last 5 Encounters:   05/19/22 87 8 kg (193 lb 9 oz)   05/10/22 86 4 kg (190 lb 7 6 oz)   03/29/21 88 9 kg (196 lb)   07/13/20 80 3 kg (177 lb)   03/07/17 85 7 kg (189 lb)     I/O       05/20 0701  05/21 0700 05/21 0701  05/22 0700 05/22 0701  05/23 0700    P  O   480     I V   310 9     Total Intake  790 9     Urine 500 650     Total Output 500 650     Net -500 +140 9            Unmeasured Urine Occurrence  2 x               Physical Exam  Vitals and nursing note reviewed  Constitutional:       General: She is not in acute distress  Appearance: Normal appearance  She is well-developed  She is obese  She is not ill-appearing  HENT:      Head: Normocephalic and atraumatic  Nose: No congestion  Eyes:      General: No scleral icterus  Conjunctiva/sclera: Conjunctivae normal    Neck:      Vascular: No carotid bruit or JVD  Cardiovascular:      Rate and Rhythm: Normal rate and regular rhythm  Pulses: Normal pulses  Heart sounds: Normal heart sounds  No murmur heard  No friction rub  No gallop  Pulmonary:      Effort: Pulmonary effort is normal  No respiratory distress  Breath sounds: Normal breath sounds  No rales  Abdominal:      General: There is no distension  Palpations: Abdomen is soft  Tenderness: There is no abdominal tenderness  Musculoskeletal:         General: No swelling or tenderness  Cervical back: Neck supple  Right lower leg: Edema present  Left lower leg: Edema present  Skin:     General: Skin is warm  Neurological:      General: No focal deficit present  Mental Status: She is alert and oriented to person, place, and time  Mental status is at baseline  Psychiatric:         Mood and Affect: Mood normal          Behavior: Behavior normal          Thought Content:  Thought content normal          Laboratory Results: personally reviewed        CBC with diff:   Results from last 7 days   Lab Units 05/21/22  0507 05/20/22  0453 05/19/22  1821   WBC Thousand/uL 7 55 8 69 6 65   HEMOGLOBIN g/dL 11 0* 12 2 11 8   HEMATOCRIT % 34 2* 38 9 38 8   MCV fL 91 94 93   PLATELETS Thousands/uL 156 204 129*   MCH pg 29 1 29 5 28 3   MCHC g/dL 32 2 31 4 30 4*   RDW % 14 9 14 6 14 5   MPV fL 10 6 10 6 10 3   NRBC AUTO /100 WBCs 0 0 0         CMP:  Results from last 7 days   Lab Units 05/21/22  0507 05/20/22  0453 05/19/22  1821   POTASSIUM mmol/L 3 7 3 4* 3 3*   CHLORIDE mmol/L 108 107 107   CO2 mmol/L 28 24 22   BUN mg/dL 10 6 7   CREATININE mg/dL 0 88 0 90 1 05   CALCIUM mg/dL 9 2 8 5 8 9   EGFR ml/min/1 73sq m 65 63 52         BMP:  Results from last 7 days   Lab Units 05/21/22  0507 05/20/22  0453 05/19/22  1821   POTASSIUM mmol/L 3 7 3 4* 3 3*   CHLORIDE mmol/L 108 107 107   CO2 mmol/L 28 24 22   BUN mg/dL 10 6 7   CREATININE mg/dL 0 88 0 90 1 05   CALCIUM mg/dL 9 2 8 5 8 9       BNP: No results for input(s): BNP in the last 72 hours      Magnesium:       Coags:   Results from last 7 days   Lab Units 05/22/22  0515 05/21/22  1139 05/21/22  0507 05/20/22  2200 05/20/22  1553 05/20/22  0943 05/20/22  0138 05/19/22  1925   PTT seconds 53* 68* 81* 46* 71* 52* 82* 21*   INR   --   --   --   --   --   --   --  0 98       TSH:        Hemoglobin A1C   Results from last 7 days   Lab Units 05/21/22  0507   HEMOGLOBIN A1C % 6 3*       Lipid Profile:   Results from last 7 days   Lab Units 05/21/22  0507   TRIGLYCERIDES mg/dL 208*   HDL mg/dL 38*       Meds/Allergies   all current active meds have been reviewed and current meds:   Current Facility-Administered Medications   Medication Dose Route Frequency    acetaminophen (TYLENOL) tablet 650 mg  650 mg Oral Q4H PRN    albuterol (PROVENTIL HFA,VENTOLIN HFA) inhaler 2 puff  2 puff Inhalation Q4H PRN    aspirin (ECOTRIN LOW STRENGTH) EC tablet 81 mg  81 mg Oral Daily    atorvastatin (LIPITOR) tablet 40 mg  40 mg Oral Daily With Dinner    benzonatate (TESSALON PERLES) capsule 100 mg  100 mg Oral TID    dextromethorphan-guaiFENesin (ROBITUSSIN DM) oral syrup 10 mL  10 mL Oral Q4H PRN    fluticasone-vilanterol (BREO ELLIPTA) 100-25 mcg/inh inhaler 1 puff  1 puff Inhalation Daily    furosemide (LASIX) injection 40 mg  40 mg Intravenous BID (diuretic)    heparin (porcine) 25,000 units in 0 45% NaCl 250 mL infusion (premix)  3-20 Units/kg/hr (Order-Specific) Intravenous Titrated    insulin lispro (HumaLOG) 100 units/mL subcutaneous injection 1-5 Units  1-5 Units Subcutaneous TID AC    insulin lispro (HumaLOG) 100 units/mL subcutaneous injection 1-5 Units  1-5 Units Subcutaneous HS    levalbuterol (XOPENEX) inhalation solution 1 25 mg  1 25 mg Nebulization BID    melatonin tablet 3 mg  3 mg Oral HS    metoprolol tartrate (LOPRESSOR) tablet 25 mg  25 mg Oral Q12H [address]    nitroglycerin (NITROSTAT) SL tablet 0 4 mg  0 4 mg Sublingual Q5 Min PRN    ondansetron (ZOFRAN) injection 4 mg  4 mg Intravenous Q6H PRN    pantoprazole (PROTONIX) EC tablet 40 mg  40 mg Oral Early Morning     Medications Prior to Admission   Medication    apixaban (ELIQUIS) 5 mg    aspirin (ECOTRIN LOW STRENGTH) 81 mg EC tablet    atorvastatin (LIPITOR) 10 mg tablet    benzonatate (TESSALON PERLES) 100 mg capsule    cyclobenzaprine (FLEXERIL) 10 mg tablet    fluticasone (FLONASE) 50 mcg/act nasal spray    fluticasone-umeclidinium-vilanterol (Trelegy Ellipta) 100-62 5-25 MCG/INH inhaler    furosemide (Lasix) 40 mg tablet    lansoprazole (PREVACID) 30 mg capsule    meclizine (ANTIVERT) 25 mg tablet    Melatonin 5 MG TABS    meloxicam (MOBIC) 7 5 mg tablet    metFORMIN (GLUCOPHAGE) 850 mg tablet    metoprolol tartrate (LOPRESSOR) 25 mg tablet    montelukast (SINGULAIR) 10 mg tablet    oxyCODONE (ROXICODONE) 5 mg immediate release tablet    RA COUGH DM 30 MG/5ML SUER     heparin (porcine), 3-20 Units/kg/hr (Order-Specific), Last Rate: 17 Units/kg/hr (05/22/22 0800)          Cardiac testing: reviewed  No results found for this or any previous visit  No results found for this or any previous visit  No results found for this or any previous visit  No results found for this or any previous visit

## 2022-05-22 NOTE — ASSESSMENT & PLAN NOTE
[facility] 4/15/2022, [facility]:  Multivessel CAD -- 80% ostial RCA, calcified ostial LM stenosis  99% mLAD affecting origin of small D2  Continue aspirin beta-blocker statin

## 2022-05-22 NOTE — PLAN OF CARE
Problem: Potential for Falls  Goal: Patient will remain free of falls  Description: INTERVENTIONS:  - Educate patient/family on patient safety including physical limitations  - Instruct patient to call for assistance with activity   - Consult OT/PT to assist with strengthening/mobility   - Keep Call bell within reach  - Keep bed low and locked with side rails adjusted as appropriate  - Keep care items and personal belongings within reach  - Initiate and maintain comfort rounds  - Make Fall Risk Sign visible to staff  - Offer Toileting every 2 Hours, in advance of need  - Initiate/Maintain alarm  - Obtain necessary fall risk management equipment  - Apply yellow socks and bracelet for high fall risk patients  - Consider moving patient to room near nurses station  Outcome: Progressing     Problem: PAIN - ADULT  Goal: Verbalizes/displays adequate comfort level or baseline comfort level  Description: Interventions:  - Encourage patient to monitor pain and request assistance  - Assess pain using appropriate pain scale  - Administer analgesics based on type and severity of pain and evaluate response  - Implement non-pharmacological measures as appropriate and evaluate response  - Consider cultural and social influences on pain and pain management  - Notify physician/advanced practitioner if interventions unsuccessful or patient reports new pain  Outcome: Progressing     Problem: INFECTION - ADULT  Goal: Absence or prevention of progression during hospitalization  Description: INTERVENTIONS:  - Assess and monitor for signs and symptoms of infection  - Monitor lab/diagnostic results  - Monitor all insertion sites, i e  indwelling lines, tubes, and drains  - Monitor endotracheal if appropriate and nasal secretions for changes in amount and color  - [place] appropriate cooling/warming therapies per order  - Administer medications as ordered  - Instruct and encourage patient and family to use good hand hygiene technique  - Identify and instruct in appropriate isolation precautions for identified infection/condition  Outcome: Progressing  Goal: Absence of fever/infection during neutropenic period  Description: INTERVENTIONS:  - Monitor WBC    Outcome: Progressing     Problem: SAFETY ADULT  Goal: Maintain or return to baseline ADL function  Description: INTERVENTIONS:  -  Assess patient's ability to carry out ADLs; assess patient's baseline for ADL function and identify physical deficits which impact ability to perform ADLs (bathing, care of mouth/teeth, toileting, grooming, dressing, etc )  - Assess/evaluate cause of self-care deficits   - Assess range of motion  - Assess patient's mobility; develop plan if impaired  - Assess patient's need for assistive devices and provide as appropriate  - Encourage maximum independence but intervene and supervise when necessary  - Involve family in performance of ADLs  - Assess for home care needs following discharge   - Consider OT consult to assist with ADL evaluation and planning for discharge  - Provide patient education as appropriate  Outcome: Progressing  Goal: Maintains/Returns to pre admission functional level  Description: INTERVENTIONS:  - Perform BMAT or MOVE assessment daily    - Set and communicate daily mobility goal to care team and patient/family/caregiver  - Collaborate with rehabilitation services on mobility goals if consulted  - Perform Range of Motion 3 times a day  - Reposition patient every 2 hours    - Dangle patient 3 times a day  - Stand patient 3 times a day  - Ambulate patient 3 times a day  - Out of bed to chair 3 times a day   - Out of bed for meals 3 times a day  - Out of bed for toileting  - Record patient progress and toleration of activity level   Outcome: Progressing

## 2022-05-23 ENCOUNTER — APPOINTMENT (INPATIENT)
Dept: PULMONOLOGY | Facility: HOSPITAL | Age: 72
DRG: 246 | End: 2022-05-23
Payer: COMMERCIAL

## 2022-05-23 LAB
APTT PPP: 158 SECONDS (ref 23–37)
APTT PPP: 188 SECONDS (ref 23–37)
APTT PPP: 33 SECONDS (ref 23–37)
GLUCOSE SERPL-MCNC: 139 MG/DL (ref 65–140)
GLUCOSE SERPL-MCNC: 157 MG/DL (ref 65–140)
GLUCOSE SERPL-MCNC: 164 MG/DL (ref 65–140)
GLUCOSE SERPL-MCNC: 175 MG/DL (ref 65–140)

## 2022-05-23 PROCEDURE — 99233 SBSQ HOSP IP/OBS HIGH 50: CPT | Performed by: INTERNAL MEDICINE

## 2022-05-23 PROCEDURE — NC001 PR NO CHARGE: Performed by: THORACIC SURGERY (CARDIOTHORACIC VASCULAR SURGERY)

## 2022-05-23 PROCEDURE — 94640 AIRWAY INHALATION TREATMENT: CPT

## 2022-05-23 PROCEDURE — 94060 EVALUATION OF WHEEZING: CPT

## 2022-05-23 PROCEDURE — 82948 REAGENT STRIP/BLOOD GLUCOSE: CPT

## 2022-05-23 PROCEDURE — 94729 DIFFUSING CAPACITY: CPT | Performed by: INTERNAL MEDICINE

## 2022-05-23 PROCEDURE — 94729 DIFFUSING CAPACITY: CPT

## 2022-05-23 PROCEDURE — 99232 SBSQ HOSP IP/OBS MODERATE 35: CPT | Performed by: PHYSICIAN ASSISTANT

## 2022-05-23 PROCEDURE — 94726 PLETHYSMOGRAPHY LUNG VOLUMES: CPT

## 2022-05-23 PROCEDURE — 94726 PLETHYSMOGRAPHY LUNG VOLUMES: CPT | Performed by: INTERNAL MEDICINE

## 2022-05-23 PROCEDURE — 94760 N-INVAS EAR/PLS OXIMETRY 1: CPT

## 2022-05-23 PROCEDURE — 85730 THROMBOPLASTIN TIME PARTIAL: CPT | Performed by: GENERAL PRACTICE

## 2022-05-23 PROCEDURE — 94060 EVALUATION OF WHEEZING: CPT | Performed by: INTERNAL MEDICINE

## 2022-05-23 RX ORDER — ALBUTEROL SULFATE 2.5 MG/3ML
2.5 SOLUTION RESPIRATORY (INHALATION) ONCE
Status: COMPLETED | OUTPATIENT
Start: 2022-05-23 | End: 2022-05-23

## 2022-05-23 RX ADMIN — FUROSEMIDE 40 MG: 10 INJECTION, SOLUTION INTRAMUSCULAR; INTRAVENOUS at 08:16

## 2022-05-23 RX ADMIN — BENZONATATE 100 MG: 100 CAPSULE ORAL at 22:22

## 2022-05-23 RX ADMIN — ALBUTEROL SULFATE 2.5 MG: 2.5 SOLUTION RESPIRATORY (INHALATION) at 07:07

## 2022-05-23 RX ADMIN — INSULIN LISPRO 1 UNITS: 100 INJECTION, SOLUTION INTRAVENOUS; SUBCUTANEOUS at 06:48

## 2022-05-23 RX ADMIN — ASPIRIN 81 MG: 81 TABLET, COATED ORAL at 08:16

## 2022-05-23 RX ADMIN — HEPARIN SODIUM 14.8 UNITS/KG/HR: 10000 INJECTION, SOLUTION INTRAVENOUS at 08:16

## 2022-05-23 RX ADMIN — BENZONATATE 100 MG: 100 CAPSULE ORAL at 18:06

## 2022-05-23 RX ADMIN — INSULIN LISPRO 1 UNITS: 100 INJECTION, SOLUTION INTRAVENOUS; SUBCUTANEOUS at 18:07

## 2022-05-23 RX ADMIN — Medication 3 MG: at 22:22

## 2022-05-23 RX ADMIN — FLUTICASONE FUROATE AND VILANTEROL TRIFENATATE 1 PUFF: 100; 25 POWDER RESPIRATORY (INHALATION) at 08:20

## 2022-05-23 RX ADMIN — PANTOPRAZOLE SODIUM 40 MG: 40 TABLET, DELAYED RELEASE ORAL at 06:49

## 2022-05-23 RX ADMIN — INSULIN LISPRO 1 UNITS: 100 INJECTION, SOLUTION INTRAVENOUS; SUBCUTANEOUS at 11:46

## 2022-05-23 RX ADMIN — METOPROLOL TARTRATE 25 MG: 25 TABLET, FILM COATED ORAL at 08:16

## 2022-05-23 RX ADMIN — ATORVASTATIN CALCIUM 40 MG: 40 TABLET, FILM COATED ORAL at 18:06

## 2022-05-23 RX ADMIN — FUROSEMIDE 40 MG: 10 INJECTION, SOLUTION INTRAMUSCULAR; INTRAVENOUS at 18:06

## 2022-05-23 RX ADMIN — LEVALBUTEROL HYDROCHLORIDE 1.25 MG: 1.25 SOLUTION RESPIRATORY (INHALATION) at 21:12

## 2022-05-23 RX ADMIN — LEVALBUTEROL HYDROCHLORIDE 1.25 MG: 1.25 SOLUTION RESPIRATORY (INHALATION) at 07:04

## 2022-05-23 NOTE — PROGRESS NOTES
Progress Note - Cardiology   Reynaldo Blood 67 y o  female MRN: [id number]  Unit/Bed#: [facility] 845-42 Encounter: [id number]  05/23/22  10:09 AM    Impression and Plan:    53-year-old with recent diagnosis of left main and 2 vessel-LAD and RCA disease, at [facility], while awaiting further evaluation of chest pains and shortness of breath and [place] [facility] [place] here for cardiac surgical evaluation  Based on report, found to have ostial left main, proximal to mid LAD, 50% left circ,  55% diagonal 1 and 80% ostial to proximal RCA disease with mild LV dysfunction with ejection fraction of 45-50% on echocardiography  Further evaluation also demonstrated less than 50% right and 50-70% left carotid disease  There was also clinical suspicion for innominate/subclavian disease bilaterally based on blood pressure measurements and gradients but ultimately no subclavian stenosis was found on vascular ultrasonography  Awaiting CABG, awaiting transfer of coronary angiography images from [facility] for review here  Only complaint is cough    Plan:    CAD and NSTEMI:  Left main and multivessel CAD, would benefit from CABG, especially the setting of LV dysfunction and diabetes  Continue aspirin, statin, beta-blocker, IV heparin  Peak troponin this admission of 3700   continue aspirin, statin, beta-blocker    Hypertension:  Controlled     cough:  No evidence of acute CHF on exam, continue Lasix 40 mg IV twice daily,   renal function remains stable    Diabetes and dyslipidemia, last LDL of 110, non , A1c of 6 3-May 2022   on high-intensity statin at this time      ===================================================================    Chief Complaint: No chief complaint on file          Subjective/Objective     Subjective:  Denies any cardiac complaints, complains of cough    Objective:  No distress    Patient Active Problem List   Diagnosis    Primary osteoarthritis of left knee    Aortoiliac stenosis (HCC)    Arthritis of left knee    Atherosclerosis of native artery of extremity with intermittent claudication (HCC)    Diabetes mellitus ([facility] )    Renal artery stenosis (HCC)    Stenosis of carotid artery    Type 2 diabetes mellitus without complication, without long-term current use of insulin (HCC)    Carotid stenosis, asymptomatic, bilateral    Stenosis of left subclavian artery (HCC)    Aortoiliac occlusive disease (HCC)    Elevated troponin level not due to acute coronary syndrome    Hypertension    Chronic combined systolic and diastolic heart failure (HCC)    Viral pneumonia    Type 1 non-ST elevation myocardial infarction (NSTEMI) ([facility] )    COPD without exacerbation (HCC)    CAD (coronary artery disease)    Lung nodule    Obese       Vitals: BP (!) 120/46   Pulse 80   Temp 97 7 °F (36 5 °C)   Resp 18   SpO2 90%     No intake/output data recorded  Wt Readings from Last 3 Encounters:   05/19/22 87 8 kg (193 lb 9 oz)   05/10/22 86 4 kg (190 lb 7 6 oz)   03/29/21 88 9 kg (196 lb)       Intake/Output Summary (Last 24 hours) at 5/23/2022 1009  Last data filed at 5/22/2022 2000  Gross per 24 hour   Intake 490 ml   Output 675 ml   Net -185 ml     I/O last 3 completed shifts:   In: 12 [P O :660; I V :10]  Out: 675 [Urine:675]    Invasive Devices  Report    Peripheral Intravenous Line  Duration           Peripheral IV 05/19/22 Left Forearm 3 days    Peripheral IV 05/20/22 Right Forearm 2 days                  Physical Exam:  GEN: Kirit Jo appears well, alert and oriented x 3, pleasant and cooperative   HEENT: pupils equal, round, and reactive to light; extraocular muscles intact  NECK: supple, no carotid bruits or JVD  HEART: regular rhythm, normal S1 and S2, no murmur, no clicks, gallops or rubs   LUNGS: clear to auscultation bilaterally; no wheezes or rhonchi, no rales  ABDOMEN/GI: normal bowel sounds, soft, no tenderness, no distention  EXTREMITIES/Musculoskeltal: peripheral pulses normal; no clubbing, cyanosis, no edema  NEURO: no focal motor findings   SKIN: normal without suspicious lesions on exposed skin              Lab Results:       Results from last 7 days   Lab Units 05/21/22  0507 05/20/22  0453 05/19/22  1821   WBC Thousand/uL 7 55 8 69 6 65   HEMOGLOBIN g/dL 11 0* 12 2 11 8   HEMATOCRIT % 34 2* 38 9 38 8   PLATELETS Thousands/uL 156 204 129*         Results from last 7 days   Lab Units 05/21/22  0507 05/20/22  0453 05/19/22  1821   POTASSIUM mmol/L 3 7 3 4* 3 3*   CHLORIDE mmol/L 108 107 107   CO2 mmol/L 28 24 22   BUN mg/dL 10 6 7   CREATININE mg/dL 0 88 0 90 1 05   CALCIUM mg/dL 9 2 8 5 8 9     Results from last 7 days   Lab Units 05/19/22  1925   INR  0 98       Imaging: I have personally reviewed pertinent reports      EKG/Telemtry:  No events    Scheduled Meds:  Current Facility-Administered Medications   Medication Dose Route Frequency Provider Last Rate    acetaminophen  650 mg Oral Q4H PRN Jessica Falls, DO      albuterol  2 puff Inhalation Q4H PRN Darshan Ramirez MD      aspirin  81 mg Oral Daily Jessica Falls, DO      atorvastatin  40 mg Oral Daily With [employer], DO      benzonatate  100 mg Oral TID Darshan Ramirez MD      chlorhexidine  15 mL Mouth/Throat Once Minesh Duarte PA-C      dextromethorphan-guaiFENesin  10 mL Oral Q4H PRN Darshan Ramirez MD      fluticasone-vilanterol  1 puff Inhalation Daily Jessica Falls, DO      furosemide  40 mg Intravenous BID (diuretic) Monse Putnam MD      heparin (porcine)  3-20 Units/kg/hr (Order-Specific) Intravenous Titrated Jessica Falls, DO Stopped (05/23/22 0910)    insulin lispro  1-5 Units Subcutaneous TID AC Jessica Falls, DO      insulin lispro  1-5 Units Subcutaneous HS Jessica Falls, DO      levalbuterol  1 25 mg Nebulization BID Darshan Ramirez MD      melatonin  3 mg Oral HS Rena Bread, CRNP      metoprolol tartrate 25 mg Oral Q12H [address] Hildegard Duhamel, DO      mupirocin  1 application Nasal Once Karo Zepeda PA-C      nitroglycerin  0 4 mg Sublingual Q5 Min PRN Hildegard Duhamel, DO      ondansetron  4 mg Intravenous Q6H PRN Hildegard Duhamel, DO      pantoprazole  40 mg Oral Early Morning Hildegard Duhamel, DO       Continuous Infusions:  heparin (porcine), 3-20 Units/kg/hr (Order-Specific), Last Rate: Stopped (05/23/22 0910)        VTE Pharmacologic Prophylaxis: Heparin  VTE Mechanical Prophylaxis: sequential compression device    This note was completed in part utilizing [employer] direct voice recognition software  Grammatical errors, random word insertion, spelling mistakes, occasional wrong word or "sound-alike" substitutions and incomplete sentences may be an occasional consequence of the system secondary to software limitations, ambient noise and hardware issues  At the time of dictation, efforts were made to edit, clarify and /or correct errors  Please read the chart carefully and recognize, using context, where substitutions have occurred  If you have any questions or concerns about the context, text or information contained within the body of this dictation, please contact myself, the provider, for further clarification

## 2022-05-23 NOTE — ASSESSMENT & PLAN NOTE
Lab Results   Component Value Date    HGBA1C 6 3 (H) 05/21/2022       Recent Labs     05/22/22  1633 05/22/22  2106 05/23/22  0621 05/23/22  1052   POCGLU 162* 149* 164* 175*       Blood Sugar Average: Last 72 hrs:  · (P) 158 [id number]     · Metformin on hold while inpatient  · Q i d  Accu-Cheks with SSI coverage  · Monitor and adjust as needed  · Carb controlled diet  · Hypoglycemia protocol in place

## 2022-05-23 NOTE — PLAN OF CARE
Problem: PAIN - ADULT  Goal: Verbalizes/displays adequate comfort level or baseline comfort level  Description: Interventions:  - Encourage patient to monitor pain and request assistance  - Assess pain using appropriate pain scale  - Administer analgesics based on type and severity of pain and evaluate response  - Implement non-pharmacological measures as appropriate and evaluate response  - Consider cultural and social influences on pain and pain management  - Notify physician/advanced practitioner if interventions unsuccessful or patient reports new pain  Outcome: Progressing     Problem: INFECTION - ADULT  Goal: Absence or prevention of progression during hospitalization  Description: INTERVENTIONS:  - Assess and monitor for signs and symptoms of infection  - Monitor lab/diagnostic results  - Monitor all insertion sites, i e  indwelling lines, tubes, and drains  - Monitor endotracheal if appropriate and nasal secretions for changes in amount and color  - [place] appropriate cooling/warming therapies per order  - Administer medications as ordered  - Instruct and encourage patient and family to use good hand hygiene technique  - Identify and instruct in appropriate isolation precautions for identified infection/condition  Outcome: Progressing     Problem: Knowledge Deficit  Goal: Patient/family/caregiver demonstrates understanding of disease process, treatment plan, medications, and discharge instructions  Description: Complete learning assessment and assess knowledge base  Interventions:  - Provide teaching at level of understanding  - Provide teaching via preferred learning methods  Outcome: Progressing     Problem: MOBILITY - ADULT  Goal: Maintains/Returns to pre admission functional level  Description: INTERVENTIONS:  - Perform BMAT or MOVE assessment daily    - Set and communicate daily mobility goal to care team and patient/family/caregiver     - Collaborate with rehabilitation services on mobility goals if consulted  - Perform Range of Motion 3 times a day  - Reposition patient every 2 hours    - Dangle patient 3 times a day  - Stand patient 3 times a day  - Ambulate patient 3 times a day  - Out of bed to chair 3 times a day   - Out of bed for meals 3 times a day  - Out of bed for toileting  - Record patient progress and toleration of activity level   Outcome: Progressing

## 2022-05-23 NOTE — ASSESSMENT & PLAN NOTE
Wt Readings from Last 3 Encounters:   05/19/22 87 8 kg (193 lb 9 oz)   05/10/22 86 4 kg (190 lb 7 6 oz)   03/29/21 88 9 kg (196 lb)     · 2D echo with LVEF 45%  · Cardiology following  · Currently on IV Lasix 40 mg b i d   · Continue with beta-blocker  · Monitor I/O, daily weights  · Less than 2 g salt diet fluid restriction

## 2022-05-23 NOTE — OCCUPATIONAL THERAPY NOTE
Occupational Therapy Cancellation Note        Patient Name: Miguel Phipps  UZVWD'I Date: 5/23/2022 05/23/22 0733   OT Last Visit   OT Visit Date 05/23/22   Note Type   Note type Evaluation   Cancel Reasons Medical status       OT orders received, chart reviewed  Noted that pt is pending CABG with CT surgery  OT will sign off at this time, please re-consult post-op as medically appropriate  Thank you       JUAN Pichardo, OTR/L

## 2022-05-23 NOTE — PROGRESS NOTES
Progress Note - Cardiothoracic Surgery   Guillermo Velez 67 y o  female MRN: [id number]  Unit/Bed#: CW2 213-02 Encounter: [id number]      24 Hour Events: No events  Awaiting cath films from [facility] record department still      Medications:   Scheduled Meds:  Current Facility-Administered Medications   Medication Dose Route Frequency Provider Last Rate    acetaminophen  650 mg Oral Q4H PRN Earvin Latch, DO      albuterol  2 puff Inhalation Q4H PRN Angelica Goddard MD      albuterol  2 5 mg Nebulization Once Renae Ream, DO      aspirin  81 mg Oral Daily Earvin Latch, DO      atorvastatin  40 mg Oral Daily With [employer], DO      benzonatate  100 mg Oral TID Angelica Goddard MD      chlorhexidine  15 mL Mouth/Throat Once Reathbrandy Nation PA-C      dextromethorphan-guaiFENesin  10 mL Oral Q4H PRN Angelica Goddard MD      fluticasone-vilanterol  1 puff Inhalation Daily Earvin Latch, DO      furosemide  40 mg Intravenous BID (diuretic) Darrell Rand MD      heparin (porcine)  3-20 Units/kg/hr (Order-Specific) Intravenous Titrated Earvin Latch, DO 14 8 Units/kg/hr (05/23/22 0115)    insulin lispro  1-5 Units Subcutaneous TID AC Earvin Latch, DO      insulin lispro  1-5 Units Subcutaneous HS Earvin Latch, DO      levalbuterol  1 25 mg Nebulization BID Angelica Goddard MD      melatonin  3 mg Oral HS NATALIA Sheridan      metoprolol tartrate  25 mg Oral Q12H [facility] Earvin Latch, DO      mupirocin  1 application Nasal Once Tobi Nation PA-C      nitroglycerin  0 4 mg Sublingual Q5 Min PRN Earvin Latch, DO      ondansetron  4 mg Intravenous Q6H PRN Earvin Latch, DO      pantoprazole  40 mg Oral Early Morning Earvin Latch, DO       Continuous Infusions:heparin (porcine), 3-20 Units/kg/hr (Order-Specific), Last Rate: 14 8 Units/kg/hr (05/23/22 0115)        Results:   NO NEW CBC OR BMP TODAY  Hb 11 0, plt 156, Cr 0 88, A1C 6 3, /HDL 38,  (heparin), COVID 5/20 (-), troponin (+) (max 3768), PT/INR WNL, BNP 5497  MRSA 5/22: pending  Results from last 7 days   Lab Units 05/21/22  0507 05/20/22  0453 05/19/22  1821   WBC Thousand/uL 7 55 8 69 6 65   HEMOGLOBIN g/dL 11 0* 12 2 11 8   HEMATOCRIT % 34 2* 38 9 38 8   PLATELETS Thousands/uL 156 204 129*     Results from last 7 days   Lab Units 05/21/22  0507 05/20/22  0453 05/19/22  1821   POTASSIUM mmol/L 3 7 3 4* 3 3*   CHLORIDE mmol/L 108 107 107   CO2 mmol/L 28 24 22   BUN mg/dL 10 6 7   CREATININE mg/dL 0 88 0 90 1 05   CALCIUM mg/dL 9 2 8 5 8 9     Results from last 7 days   Lab Units 05/22/22  2314 05/22/22  1330 05/22/22  0515 05/20/22  0138 05/19/22  1925   INR   --   --   --   --  0 98   PTT seconds 158* 73* 53*   < > 21*    < > = values in this interval not displayed  Studies:     Cardiac cath (report [facility]) 4/20/2022: 65% osital LM (cuplrit), 99% prox rto mid LAD (culprit), 55% D1, 50% left circ, 80% osital to prox RCA    TTE 5/9: EF 45%, Takotsubo CM noted, dyskinesis of apex, akinesis of apical anterior/apical septal/apical inferior/apical lateral walls, hypokinesis of mid anterior/mid inferoseptal/mid inferior/mid anterolateral walls, mild TR, trivial pericardial effusion    LEADs 5/22: RLE JOSE 1 13, LLE JOSE 0 78    UE Duplex b/l 5/22: no acute DVT b/l    Vein Mapping 5/21: adequate b/l    EKG 5/20: done    CTA PE 5/19: no PE, pulm edema noted, 5mm LLL pulm nodule    Vitals:   Vitals:    05/22/22 1541 05/22/22 1935 05/22/22 2217 05/22/22 2218   BP: 99/64 111/65 111/65 111/65   Pulse:   80    Resp:  19     Temp: 97 9 °F (36 6 °C)      TempSrc:       SpO2:           Physical Exam:    At PFTs during encounter    Assessment:    Severe coronary artery disease;  Ongoing CABG workup    Plan:  Patient agreeable to proceed with surgery  Pre-op work-up underway   Cardiac cath images awaiting from [facility] for surgeon review   PFTs for 5/23 0830  Continue Mupirocin 2% nasal ointment q 12 hrs  Continue topical chlorhexidine bath and mouth rise  Continue heparin infusion per cardiology  coronary artery bypass grafting will be scheduled once cath images reviewed with MARVIN Mccann  (likely later this week)    SIGNATURE: Cameron Falcon PA-C  DATE: May 23, 2022  TIME: 7:17 AM    * This note was completed in part utilizing m-modal fluency direct voice recognition software  Grammatical errors, random word insertion, spelling mistakes, and incomplete sentences may be an occasional consequence of the system secondary to software limitations, ambient noise and hardware issues  At the time of dictation, efforts were made to edit, clarify and /or correct errors  Please read the chart carefully and recognize, using context, where substitutions have occurred  If you have any questions or concerns about the context, text or information contained within the body of this dictation, please contact myself, the provider, for further clarification

## 2022-05-23 NOTE — ASSESSMENT & PLAN NOTE
[facility] 4/15/2022, [facility]:  Multivessel CAD -- 80% ostial RCA, calcified ostial LM stenosis  99% mLAD affecting origin of small D2  · Continue aspirin beta-blocker statin  · See plan above

## 2022-05-23 NOTE — PROGRESS NOTES
[facility]  Progress Note - Kirit Jo [birth year], 67 y o  female MRN: [id number]  Unit/Bed#: CW2 213-02 Encounter: [id number]  Primary Care Provider: Richa Arora MD   Date and time admitted to hospital: 5/20/2022  5:15 PM    * Type 1 non-ST elevation myocardial infarction (NSTEMI) [facility])  Assessment & Plan  · Patient presented the emergency room with complaint of chest pain  · History of known severe triple-vessel disease on cardiac catheterization in 4/15/2022  · Presently on IV heparin GTT  · Continue aspirin, beta-blocker and statin  · CT surgery plans for CABG noted, workup underway  · Awaiting cath reports from [facility] for surgical planning   · Cardiology following    Chronic combined systolic and diastolic heart failure [facility]  Assessment & Plan  Wt Readings from Last 3 Encounters:   05/19/22 87 8 kg (193 lb 9 oz)   05/10/22 86 4 kg (190 lb 7 6 oz)   03/29/21 88 9 kg (196 lb)     · 2D echo with LVEF 45%  · Cardiology following  · Currently on IV Lasix 40 mg b i d   · Continue with beta-blocker  · Monitor I/O, daily weights  · Less than 2 g salt diet fluid restriction    CAD (coronary artery disease)  Assessment & Plan  [facility] 4/15/2022, [facility]:  Multivessel CAD -- 80% ostial RCA, calcified ostial LM stenosis  99% mLAD affecting origin of small D2  · Continue aspirin beta-blocker statin  · See plan above    Aortoiliac occlusive disease ([facility] )  Assessment & Plan  · Patient with known aortoiliac occlusive disease with bilateral iliac stents  · Follows outpatient with vascular surgery  · Continue aspirin and statin      Lung nodule  Assessment & Plan  · Incidental finding on CT scan:  5 mm nodule in the left lower lobe  · Repeat chest CT scan in 12 months    Type 2 diabetes mellitus, without long-term current use of insulin [facility])  Assessment & Plan  Lab Results   Component Value Date    HGBA1C 6 3 (H) 05/21/2022       Recent Labs     05/22/22  1633 05/22/22  2106 22  0621 22  1052   POCGLU 162* 149* 164* 175*       Blood Sugar Average: Last 72 hrs:  · (P) 158 [id number]     · Metformin on hold while inpatient  · Q i d  Accu-Cheks with SSI coverage  · Monitor and adjust as needed  · Carb controlled diet  · Hypoglycemia protocol in place      Obese  Assessment & Plan  · Therapeutic lifestyle modification encouraged  · Outpatient sleep study recommended        VTE Pharmacologic Prophylaxis: VTE Score: 4 Moderate Risk (Score 3-4) - Pharmacological DVT Prophylaxis Ordered: heparin drip  Patient Centered Rounds: I performed bedside rounds with nursing staff today  Discussions with Specialists or Other Care Team Provider: primary RN     Education and Discussions with Family / Patient: Patient declined call to   Time Spent for Care: 15 minutes  More than 50% of total time spent on counseling and coordination of care as described above  Current Length of Stay: 3 day(s)  Current Patient Status: Inpatient   Certification Statement: The patient will continue to require additional inpatient hospital stay due to awaiting CT surgery plans / recommendations for CABG  Discharge Plan: Anticipate discharge in >72 hrs to discharge location to be determined pending rehab evaluations  Code Status: Level 1 - Full Code    Subjective:   Patient offers no new complaints today  She notes some chest tightness when she coughs  Expressed frustration because she was under the impression she would have surgery today  Objective:     Vitals:   Temp (24hrs), Av 8 °F (36 6 °C), Min:97 7 °F (36 5 °C), Max:97 9 °F (36 6 °C)    Temp:  [97 7 °F (36 5 °C)-97 9 °F (36 6 °C)] 97 7 °F (36 5 °C)  HR:  [80] 80  Resp:  [18-19] 18  BP: ()/(46-65) 120/46  There is no height or weight on file to calculate BMI  Input and Output Summary (last 24 hours):      Intake/Output Summary (Last 24 hours) at 2022 1514  Last data filed at 2022 2000  Gross per 24 hour   Intake 490 ml   Output 675 ml   Net -185 ml       Physical Exam:   Physical Exam  Vitals and nursing note reviewed  Constitutional:       General: She is not in acute distress  Cardiovascular:      Rate and Rhythm: Normal rate and regular rhythm  Pulmonary:      Effort: Pulmonary effort is normal  No respiratory distress  Neurological:      General: No focal deficit present  Mental Status: She is alert and oriented to person, place, and time  Mental status is at baseline            Additional Data:     Labs:  Results from last 7 days   Lab Units 05/21/22  0507   WBC Thousand/uL 7 55   HEMOGLOBIN g/dL 11 0*   HEMATOCRIT % 34 2*   PLATELETS Thousands/uL 156   NEUTROS PCT % 54   LYMPHS PCT % 30   MONOS PCT % 13*   EOS PCT % 2     Results from last 7 days   Lab Units 05/21/22  0507   SODIUM mmol/L 141   POTASSIUM mmol/L 3 7   CHLORIDE mmol/L 108   CO2 mmol/L 28   BUN mg/dL 10   CREATININE mg/dL 0 88   ANION GAP mmol/L 5   CALCIUM mg/dL 9 2   GLUCOSE RANDOM mg/dL 142*     Results from last 7 days   Lab Units 05/19/22  1925   INR  0 98     Results from last 7 days   Lab Units 05/23/22  1052 05/23/22  0621 05/22/22  2106 05/22/22  1633 05/22/22  1103 05/22/22  0624 05/21/22  2135 05/21/22  1645 05/21/22  1611 05/21/22  1059 05/20/22  2127 05/20/22  1800   POC GLUCOSE mg/dl 175* 164* 149* 162* 164* 128 156* 161* >500* 212* 138 130     Results from last 7 days   Lab Units 05/21/22  0507   HEMOGLOBIN A1C % 6 3*           Lines/Drains:  Invasive Devices  Report    Peripheral Intravenous Line  Duration           Peripheral IV 05/19/22 Left Forearm 3 days    Peripheral IV 05/20/22 Right Forearm 3 days                  Telemetry:  Telemetry Orders (From admission, onward)             48 Hour Telemetry Monitoring  Continuous x 48 hours        References:    Telemetry Guidelines   Question:  Reason for 48 Hour Telemetry  Answer:  Cardiac Surgery                 Telemetry Reviewed: Normal Sinus Rhythm  Indication for Continued Telemetry Use: Awaiting PCI/EP Study/CABG             Imaging: No pertinent imaging reviewed  Recent Cultures (last 7 days):         Last 24 Hours Medication List:   Current Facility-Administered Medications   Medication Dose Route Frequency Provider Last Rate    acetaminophen  650 mg Oral Q4H PRN Nieves Colace, DO      albuterol  2 puff Inhalation Q4H PRN Liv Lloyd MD      aspirin  81 mg Oral Daily Nieves Colace, DO      atorvastatin  40 mg Oral Daily With Docin, DO      benzonatate  100 mg Oral TID Liv Lloyd MD      chlorhexidine  15 mL Mouth/Throat Once Martina Aldrich PA-C      dextromethorphan-guaiFENesin  10 mL Oral Q4H PRN Liv Lloyd MD      fluticasone-vilanterol  1 puff Inhalation Daily Nieves Colace, DO      furosemide  40 mg Intravenous BID (diuretic) Harman Vallejo MD      heparin (porcine)  3-20 Units/kg/hr (Order-Specific) Intravenous Titrated Nieves Colace, DO 11 8 Units/kg/hr (05/23/22 1028)    insulin lispro  1-5 Units Subcutaneous TID AC Nieves Colace, DO      insulin lispro  1-5 Units Subcutaneous HS Nieves Colace, DO      levalbuterol  1 25 mg Nebulization BID Liv Lloyd MD      melatonin  3 mg Oral HS NATALIA Donaldson      metoprolol tartrate  25 mg Oral Q12H [address] Nieves Colace, DO      mupirocin  1 application Nasal Once Martina Aldrich PA-C      nitroglycerin  0 4 mg Sublingual Q5 Min PRN Nieves Colace, DO      ondansetron  4 mg Intravenous Q6H PRN Nieves Colace, DO      pantoprazole  40 mg Oral Early Morning Nieves Colace, DO          Today, Patient Was Seen By: Cristiano Hare PA-C    **Please Note: This note may have been constructed using a voice recognition system  **

## 2022-05-23 NOTE — ASSESSMENT & PLAN NOTE
· Patient presented the emergency room with complaint of chest pain  · History of known severe triple-vessel disease on cardiac catheterization in 4/15/2022  · Presently on IV heparin GTT  · Continue aspirin, beta-blocker and statin  · CT surgery plans for CABG noted, workup underway  · Awaiting cath reports from [facility] for surgical planning   · Cardiology following

## 2022-05-24 ENCOUNTER — PREP FOR PROCEDURE (OUTPATIENT)
Dept: CARDIOLOGY CLINIC | Facility: CLINIC | Age: 72
End: 2022-05-24

## 2022-05-24 ENCOUNTER — HOME HEALTH ADMISSION (OUTPATIENT)
Dept: HOME HEALTH SERVICES | Facility: HOME HEALTHCARE | Age: 72
End: 2022-05-24

## 2022-05-24 DIAGNOSIS — I21.4 NSTEMI (NON-ST ELEVATED MYOCARDIAL INFARCTION) (HCC): Primary | ICD-10-CM

## 2022-05-24 LAB
ANION GAP SERPL CALCULATED.3IONS-SCNC: 5 MMOL/L (ref 4–13)
APTT PPP: 48 SECONDS (ref 23–37)
APTT PPP: 49 SECONDS (ref 23–37)
BUN SERPL-MCNC: 10 MG/DL (ref 5–25)
CALCIUM SERPL-MCNC: 9.4 MG/DL (ref 8.3–10.1)
CHLORIDE SERPL-SCNC: 105 MMOL/L (ref 100–108)
CO2 SERPL-SCNC: 29 MMOL/L (ref 21–32)
CREAT SERPL-MCNC: 0.96 MG/DL (ref 0.6–1.3)
ERYTHROCYTE [DISTWIDTH] IN BLOOD BY AUTOMATED COUNT: 15.1 % (ref 11.6–15.1)
GFR SERPL CREATININE-BSD FRML MDRD: 58 ML/MIN/1.73SQ M
GLUCOSE SERPL-MCNC: 125 MG/DL (ref 65–140)
GLUCOSE SERPL-MCNC: 140 MG/DL (ref 65–140)
GLUCOSE SERPL-MCNC: 140 MG/DL (ref 65–140)
GLUCOSE SERPL-MCNC: 141 MG/DL (ref 65–140)
GLUCOSE SERPL-MCNC: 168 MG/DL (ref 65–140)
HCT VFR BLD AUTO: 37.3 % (ref 34.8–46.1)
HGB BLD-MCNC: 11.6 G/DL (ref 11.5–15.4)
KCT BLD-ACNC: 285 SEC (ref 89–137)
MCH RBC QN AUTO: 28.9 PG (ref 26.8–34.3)
MCHC RBC AUTO-ENTMCNC: 31.1 G/DL (ref 31.4–37.4)
MCV RBC AUTO: 93 FL (ref 82–98)
PLATELET # BLD AUTO: 115 THOUSANDS/UL (ref 149–390)
PMV BLD AUTO: 11 FL (ref 8.9–12.7)
POTASSIUM SERPL-SCNC: 3.1 MMOL/L (ref 3.5–5.3)
RBC # BLD AUTO: 4.01 MILLION/UL (ref 3.81–5.12)
SODIUM SERPL-SCNC: 139 MMOL/L (ref 136–145)
SPECIMEN SOURCE: ABNORMAL
WBC # BLD AUTO: 8.32 THOUSAND/UL (ref 4.31–10.16)

## 2022-05-24 PROCEDURE — 82948 REAGENT STRIP/BLOOD GLUCOSE: CPT

## 2022-05-24 PROCEDURE — C1874 STENT, COATED/COV W/DEL SYS: HCPCS | Performed by: INTERNAL MEDICINE

## 2022-05-24 PROCEDURE — 87081 CULTURE SCREEN ONLY: CPT | Performed by: PHYSICIAN ASSISTANT

## 2022-05-24 PROCEDURE — C1769 GUIDE WIRE: HCPCS | Performed by: INTERNAL MEDICINE

## 2022-05-24 PROCEDURE — 027135Z DILATION OF CORONARY ARTERY, TWO ARTERIES WITH TWO DRUG-ELUTING INTRALUMINAL DEVICES, PERCUTANEOUS APPROACH: ICD-10-PCS | Performed by: INTERNAL MEDICINE

## 2022-05-24 PROCEDURE — 85730 THROMBOPLASTIN TIME PARTIAL: CPT | Performed by: GENERAL PRACTICE

## 2022-05-24 PROCEDURE — C9600 PERC DRUG-EL COR STENT SING: HCPCS | Performed by: INTERNAL MEDICINE

## 2022-05-24 PROCEDURE — 85347 COAGULATION TIME ACTIVATED: CPT

## 2022-05-24 PROCEDURE — 92978 ENDOLUMINL IVUS OCT C 1ST: CPT | Performed by: INTERNAL MEDICINE

## 2022-05-24 PROCEDURE — C1725 CATH, TRANSLUMIN NON-LASER: HCPCS | Performed by: INTERNAL MEDICINE

## 2022-05-24 PROCEDURE — 97110 THERAPEUTIC EXERCISES: CPT

## 2022-05-24 PROCEDURE — 80048 BASIC METABOLIC PNL TOTAL CA: CPT | Performed by: INTERNAL MEDICINE

## 2022-05-24 PROCEDURE — C1887 CATHETER, GUIDING: HCPCS | Performed by: INTERNAL MEDICINE

## 2022-05-24 PROCEDURE — B2111ZZ FLUOROSCOPY OF MULTIPLE CORONARY ARTERIES USING LOW OSMOLAR CONTRAST: ICD-10-PCS | Performed by: INTERNAL MEDICINE

## 2022-05-24 PROCEDURE — 97116 GAIT TRAINING THERAPY: CPT

## 2022-05-24 PROCEDURE — 99232 SBSQ HOSP IP/OBS MODERATE 35: CPT | Performed by: PHYSICIAN ASSISTANT

## 2022-05-24 PROCEDURE — 99153 MOD SED SAME PHYS/QHP EA: CPT | Performed by: INTERNAL MEDICINE

## 2022-05-24 PROCEDURE — 99231 SBSQ HOSP IP/OBS SF/LOW 25: CPT | Performed by: INTERNAL MEDICINE

## 2022-05-24 PROCEDURE — 93458 L HRT ARTERY/VENTRICLE ANGIO: CPT | Performed by: INTERNAL MEDICINE

## 2022-05-24 PROCEDURE — C1753 CATH, INTRAVAS ULTRASOUND: HCPCS | Performed by: INTERNAL MEDICINE

## 2022-05-24 PROCEDURE — 94640 AIRWAY INHALATION TREATMENT: CPT

## 2022-05-24 PROCEDURE — 97530 THERAPEUTIC ACTIVITIES: CPT

## 2022-05-24 PROCEDURE — C1760 CLOSURE DEV, VASC: HCPCS | Performed by: INTERNAL MEDICINE

## 2022-05-24 PROCEDURE — 99152 MOD SED SAME PHYS/QHP 5/>YRS: CPT | Performed by: INTERNAL MEDICINE

## 2022-05-24 PROCEDURE — C1894 INTRO/SHEATH, NON-LASER: HCPCS | Performed by: INTERNAL MEDICINE

## 2022-05-24 PROCEDURE — B2151ZZ FLUOROSCOPY OF LEFT HEART USING LOW OSMOLAR CONTRAST: ICD-10-PCS | Performed by: INTERNAL MEDICINE

## 2022-05-24 PROCEDURE — 85027 COMPLETE CBC AUTOMATED: CPT | Performed by: INTERNAL MEDICINE

## 2022-05-24 PROCEDURE — 92928 PRQ TCAT PLMT NTRAC ST 1 LES: CPT | Performed by: INTERNAL MEDICINE

## 2022-05-24 DEVICE — XIENCE SKYPOINT™ EVEROLIMUS ELUTING CORONARY STENT SYSTEM 3.00 MM X 12 MM / RAPID-EXCHANGE
Type: IMPLANTABLE DEVICE | Status: FUNCTIONAL
Brand: XIENCE SKYPOINT™

## 2022-05-24 DEVICE — XIENCE SKYPOINT™ EVEROLIMUS ELUTING CORONARY STENT SYSTEM 4.00 MM X 08 MM / RAPID-EXCHANGE
Type: IMPLANTABLE DEVICE | Status: FUNCTIONAL
Brand: XIENCE SKYPOINT™

## 2022-05-24 DEVICE — ANGIO-SEAL VIP VASCULAR CLOSURE DEVICE
Type: IMPLANTABLE DEVICE | Status: FUNCTIONAL
Brand: ANGIO-SEAL

## 2022-05-24 RX ORDER — FENTANYL CITRATE 50 UG/ML
INJECTION, SOLUTION INTRAMUSCULAR; INTRAVENOUS AS NEEDED
Status: DISCONTINUED | OUTPATIENT
Start: 2022-05-24 | End: 2022-05-24 | Stop reason: HOSPADM

## 2022-05-24 RX ORDER — MIDAZOLAM HYDROCHLORIDE 2 MG/2ML
INJECTION, SOLUTION INTRAMUSCULAR; INTRAVENOUS AS NEEDED
Status: DISCONTINUED | OUTPATIENT
Start: 2022-05-24 | End: 2022-05-24 | Stop reason: HOSPADM

## 2022-05-24 RX ORDER — SODIUM CHLORIDE 9 MG/ML
100 INJECTION, SOLUTION INTRAVENOUS CONTINUOUS
Status: DISPENSED | OUTPATIENT
Start: 2022-05-24 | End: 2022-05-24

## 2022-05-24 RX ORDER — CLOPIDOGREL BISULFATE 75 MG/1
75 TABLET ORAL DAILY
Status: DISCONTINUED | OUTPATIENT
Start: 2022-05-25 | End: 2022-05-26 | Stop reason: HOSPADM

## 2022-05-24 RX ORDER — ONDANSETRON 2 MG/ML
INJECTION INTRAMUSCULAR; INTRAVENOUS AS NEEDED
Status: DISCONTINUED | OUTPATIENT
Start: 2022-05-24 | End: 2022-05-24 | Stop reason: HOSPADM

## 2022-05-24 RX ORDER — HEPARIN SODIUM 1000 [USP'U]/ML
INJECTION, SOLUTION INTRAVENOUS; SUBCUTANEOUS AS NEEDED
Status: DISCONTINUED | OUTPATIENT
Start: 2022-05-24 | End: 2022-05-24 | Stop reason: HOSPADM

## 2022-05-24 RX ORDER — FUROSEMIDE 10 MG/ML
40 INJECTION INTRAMUSCULAR; INTRAVENOUS DAILY
Status: DISCONTINUED | OUTPATIENT
Start: 2022-05-25 | End: 2022-05-25

## 2022-05-24 RX ORDER — PRASUGREL 10 MG/1
TABLET, FILM COATED ORAL AS NEEDED
Status: DISCONTINUED | OUTPATIENT
Start: 2022-05-24 | End: 2022-05-24 | Stop reason: HOSPADM

## 2022-05-24 RX ADMIN — PANTOPRAZOLE SODIUM 40 MG: 40 TABLET, DELAYED RELEASE ORAL at 06:16

## 2022-05-24 RX ADMIN — FLUTICASONE FUROATE AND VILANTEROL TRIFENATATE 1 PUFF: 100; 25 POWDER RESPIRATORY (INHALATION) at 08:52

## 2022-05-24 RX ADMIN — Medication 3 MG: at 21:14

## 2022-05-24 RX ADMIN — BENZONATATE 100 MG: 100 CAPSULE ORAL at 17:10

## 2022-05-24 RX ADMIN — BENZONATATE 100 MG: 100 CAPSULE ORAL at 21:14

## 2022-05-24 RX ADMIN — LEVALBUTEROL HYDROCHLORIDE 1.25 MG: 1.25 SOLUTION RESPIRATORY (INHALATION) at 08:40

## 2022-05-24 RX ADMIN — HEPARIN SODIUM 17.8 UNITS/KG/HR: 10000 INJECTION, SOLUTION INTRAVENOUS at 06:54

## 2022-05-24 RX ADMIN — ATORVASTATIN CALCIUM 40 MG: 40 TABLET, FILM COATED ORAL at 17:10

## 2022-05-24 RX ADMIN — SODIUM CHLORIDE 100 ML/HR: 0.9 INJECTION, SOLUTION INTRAVENOUS at 17:10

## 2022-05-24 RX ADMIN — BENZONATATE 100 MG: 100 CAPSULE ORAL at 08:53

## 2022-05-24 RX ADMIN — METOPROLOL TARTRATE 25 MG: 25 TABLET, FILM COATED ORAL at 21:13

## 2022-05-24 RX ADMIN — INSULIN LISPRO 1 UNITS: 100 INJECTION, SOLUTION INTRAVENOUS; SUBCUTANEOUS at 21:13

## 2022-05-24 RX ADMIN — ASPIRIN 81 MG: 81 TABLET, COATED ORAL at 08:53

## 2022-05-24 NOTE — ASSESSMENT & PLAN NOTE
[facility] 4/15/2022, [facility]:  Multivessel CAD -- 80% ostial RCA, calcified ostial LM stenosis  99% mLAD affecting origin of small D2  · Continue aspirin beta-blocker statin  · See plan above

## 2022-05-24 NOTE — CASE MANAGEMENT
Case Management Discharge Planning Note    Patient name Taye Ramirez  Location 2 213/CW2 213-02 MRN [id number]  : 1950 Date 2022       Current Admission Date: 2022  Current Admission Diagnosis:Type 1 non-ST elevation myocardial infarction (NSTEMI) [facility])   Patient Active Problem List    Diagnosis Date Noted    Obese 2022    CAD (coronary artery disease) 2022    Lung nodule 2022    Type 1 non-ST elevation myocardial infarction (NSTEMI) ([facility] ) 2022    [facility]ation [facility] 2022    Viral pneumonia 2022    Elevated troponin level not due to acute coronary syndrome 2022    Hypertension 2022    Chronic combined systolic and diastolic heart failure ([facility] ) 2022    Carotid stenosis, asymptomatic, bilateral 2020    Stenosis of left subclavian artery ([facility] ) 2020    Aortoiliac occlusive disease ([facility] ) 2020    Type 2 diabetes mellitus, without long-term current use of insulin ([facility] ) 10/29/2019    Type 2 diabetes mellitus without complication, without long-term current use of insulin ([facility] ) 2019    Primary osteoarthritis of left knee 2017    Arthritis of left knee 2016    Aortoiliac stenosis ([facility] ) 10/05/2015    Renal artery stenosis (HCC) 10/05/2015    Atherosclerosis of native artery of extremity with intermittent claudication ([facility] ) 2014    Stenosis of carotid artery 2014      LOS (days): 4  Geometric Mean LOS (GMLOS) (days):   Days to GMLOS:     OBJECTIVE:  Risk of Unplanned Readmission Score: 10 78         Current admission status: Inpatient   Preferred Pharmacy:   [facility] [address]  [address] [facility] [address]  Phone: [phone] Fax: [phone]    Primary Care Provider: Manuel Gibson MD    Primary Insurance: [facility]  Secondary Insurance:     DISCHARGE DETAILS:          Other Referral/Resources/Interventions Provided:  Interventions: C  Referral Comments: [facility] - pending CABG         Treatment Team Recommendation: Home with [address] [employer]  Discharge Destination Plan[de-identified] Home with [employer]            Additional Comments: Patient pending CABG, [facility] referral placed

## 2022-05-24 NOTE — PLAN OF CARE
Problem: PHYSICAL THERAPY ADULT  Goal: Performs mobility at highest level of function for planned discharge setting  See evaluation for individualized goals  Description: Treatment/Interventions: Functional transfer training, LE strengthening/ROM, Elevations, Therapeutic exercise, Endurance training, Equipment eval/education, Bed mobility, Gait training, Patient/family training          See flowsheet documentation for full assessment, interventions and recommendations  Outcome: Adequate for Discharge  Note: Prognosis: Good  Problem List: Decreased endurance  Assessment: Pt seen for PT session as a followup to PT eval competed on 5/21/22 following admission for CP- pt is currently undergoing CABG workup  Pt is up and ambulating freely in room and was seen for PT session for theract w/ progress to increased distances w/o AD + therex/ education  What to expect from PT services post op if/ when CABG is performed  Pt grateful and expressing un  Pt encouraged to ambulate 3-4x daily as long as she is not experiencing symptoms; breathing dn energy conservation was reviewed and pt was encouraged to call for assist/ report sx if any arise during normal delmy ambulation ad greyson in [place]  Pt was able to ambulate multiple trials w/o AD w/ distant S provided by PT and w/o LOB  Pt tolerated session well  W/o c/o  dizziness  PT will sign off for now and complete re-evaluation post CABG for needs  Pt was encouraged to continue to ambulate 3-4x daily w/ RN/ self or restorative and continue to perform all self care indep  Barriers to Discharge Comments: currently undergoing CABG w/u     PT Discharge Recommendation:  (PT will be seenf or re-eval following CABG for updated recommendations if required  If pt does not require surgery pt has no further inpt PT needs and is cleared for d/c home w/ family as recommended previous)          See flowsheet documentation for full assessment

## 2022-05-24 NOTE — ASSESSMENT & PLAN NOTE
10/02/19 1827   Post-Acute Status   Post-Acute Placement Status Discharge Plan Changed   Discharge Delays (!) Medication Delay (Cost, Insurance, Delivery)  ([facility] unable to do Fentanyal drip)       DEEP received call from Khloe with [facility] to clarify the Fentanyl drip as she stated she was under the impressions Pt was on a patch. DEEP advised she has always been on the drip and will only get an IV push by [facility] for the ride over as this hospital cannot send Fentanyl with the Pt due to regulations. DEEP was under the impression they can do the drip and wean off to something else as appropriate. Khloe reported they can not do it but will find out if they can do the IV pushes or find some other solution. DEEP contacted [facility] to place the transport in will call and advised CM/NP from MD team.    DEEP received call from Khloe asking if they can do Propofol instead. DEEP spoke with NP and confirmed they do not feel it is appropriate.     DEEP spoke with Nicolette with [facility] to see if they are able to do the Fentanyl drip and if they would be able to be offered as an option for the family. Nicolette reported they can do the drip, she will check with the MD and DON to see if they would be able to admit the Pt. DEEP received message from Nicolette a few minutes later reporting they can take the Pt today if they get orders asap.    DEEP and NP met with Pt  at bedside. Discussed the issue with the Fentanyl drip and the miscommunication with the LTAC regarding them being able to provide this for the Pt. NP discussed that it is not appropriate at this time to change it and reported the only way the Pt can go to LTAC is if they can do this drip and continue weaning. Pt  reporting being very upset that this was all done and now it is suddenly not. DEEP contacted Khloe while at bedside so that she could answer his questions. DEEP offered Pt  the option of switching to [facility] because they are able to do the drip and might  Wt Readings from Last 3 Encounters:   05/19/22 87 8 kg (193 lb 9 oz)   05/10/22 86 4 kg (190 lb 7 6 oz)   03/29/21 88 9 kg (196 lb)     · 2D echo with LVEF 45%  · Cardiology following  · Currently on IV Lasix 40 mg b i d   · Continue with beta-blocker  · Monitor I/O, daily weights  · Less than 2 g salt diet fluid restriction be able to take the Pt today vs tomorrow AM with it and wean her more gradually. Pt  reported he does not want to go there and would rather go back with her to the [facility]. DEEP will work with MD team and [facility] to find a solution on when Pt will be able to dc there.     DEEP and NP spoke with on Call MD regarding the issue. He also spoke with Khloe from [facility] regarding the issue. He will update Pt current MD tomorrow regarding this issue.     DEEP advised by CM that she has contacted [facility] to report the Pt  preference.     DEEP advised RN that a solution will be found tomorrow with MD team regarding LTAC.     Toña Henning, REN[facility]  Neurocritical Care   [facility]  [id number]

## 2022-05-24 NOTE — PLAN OF CARE
Problem: PAIN - ADULT  Goal: Verbalizes/displays adequate comfort level or baseline comfort level  Description: Interventions:  - Encourage patient to monitor pain and request assistance  - Assess pain using appropriate pain scale  - Administer analgesics based on type and severity of pain and evaluate response  - Implement non-pharmacological measures as appropriate and evaluate response  - Consider cultural and social influences on pain and pain management  - Notify physician/advanced practitioner if interventions unsuccessful or patient reports new pain  Outcome: Progressing     Problem: INFECTION - ADULT  Goal: Absence or prevention of progression during hospitalization  Description: INTERVENTIONS:  - Assess and monitor for signs and symptoms of infection  - Monitor lab/diagnostic results  - Monitor all insertion sites, i e  indwelling lines, tubes, and drains  - Monitor endotracheal if appropriate and nasal secretions for changes in amount and color  - [place] appropriate cooling/warming therapies per order  - Administer medications as ordered  - Instruct and encourage patient and family to use good hand hygiene technique  - Identify and instruct in appropriate isolation precautions for identified infection/condition  Outcome: Progressing     Problem: Knowledge Deficit  Goal: Patient/family/caregiver demonstrates understanding of disease process, treatment plan, medications, and discharge instructions  Description: Complete learning assessment and assess knowledge base    Interventions:  - Provide teaching at level of understanding  - Provide teaching via preferred learning methods  Outcome: Progressing

## 2022-05-24 NOTE — PROGRESS NOTES
Progress Note - Cardiology   Usman Tello 67 y o  female MRN: [id number]  Unit/Bed#: BE CATH LAB ROOM Encounter: [id number]  05/24/22  1:35 PM    Impression and Plan:    23-year-old with recent diagnosis of left main and 2 vessel-LAD and RCA disease, at [facility], while awaiting further evaluation of chest pains and shortness of breath and presented to [facility] and transferred here for cardiac surgical evaluation  Based on report, found to have ostial left main, proximal to mid LAD, 50% left circ,  55% diagonal 1 and 80% ostial to proximal RCA disease with mild LV dysfunction with ejection fraction of 45-50% on echocardiography  Further evaluation also demonstrated less than 50% right and 50-70% left carotid disease  There was also clinical suspicion for innominate/subclavian disease bilaterally based on blood pressure measurements and gradients but ultimately no subclavian stenosis was found on vascular ultrasonography  Awaiting CABG, awaiting transfer of coronary angiography images from [facility] for review here  Only complaint is cough    Plan:    CAD and NSTEMI:  Left main and multivessel CAD, ideally would benefit from CABG, especially the setting of LV dysfunction and diabetes  But due to a hostile-calcified aorta, plan is for PCI of the LAD today  This was discussed by the cardiac surgeon with Dr Sy Alvarado yesterday    Continue aspirin, statin, beta-blocker, IV heparin  Peak troponin this admission of 3700   continue aspirin, statin, beta-blocker    Hypertension:  Controlled     cough:  No evidence of acute CHF on exam, decrease Lasix to 40 milligrams IV daily   renal function remains stable    Diabetes and dyslipidemia, last LDL of 110, non , A1c of 6 3-May 2022   on high-intensity statin at this time      ===================================================================    Chief Complaint: No chief complaint on file         Subjective/Objective     Subjective:  Denies any cardiac complaints, complains of cough    Objective:  No distress    Patient Active Problem List   Diagnosis    Primary osteoarthritis of left knee    Aortoiliac stenosis (HCC)    Arthritis of left knee    Atherosclerosis of native artery of extremity with intermittent claudication (HCC)    Type 2 diabetes mellitus, without long-term current use of insulin (HCC)    Renal artery stenosis (HCC)    Stenosis of carotid artery    Type 2 diabetes mellitus without complication, without long-term current use of insulin (HCC)    Carotid stenosis, asymptomatic, bilateral    Stenosis of left subclavian artery (HCC)    Aortoiliac occlusive disease (HCC)    Elevated troponin level not due to acute coronary syndrome    Hypertension    Chronic combined systolic and diastolic heart failure (HCC)    Viral pneumonia    Type 1 non-ST elevation myocardial infarction (NSTEMI) ([facility] [address] )    COPD without exacerbation (HCC)    CAD (coronary artery disease)    Lung nodule    Obese       Vitals: /59 (BP Location: Right leg)   Pulse 72   Temp 97 5 °F (36 4 °C)   Resp 16   SpO2 92%     I/O this shift:  In: 10 [I V :10]  Out: -   Wt Readings from Last 3 Encounters:   05/19/22 87 8 kg (193 lb 9 oz)   05/10/22 86 4 kg (190 lb 7 6 oz)   03/29/21 88 9 kg (196 lb)       Intake/Output Summary (Last 24 hours) at 5/24/2022 1335  Last data filed at 5/24/2022 0701  Gross per 24 hour   Intake 500 ml   Output 900 ml   Net -400 ml     I/O last 3 completed shifts: In: 200 [P O :960; I V :20]  Out: 2175 [Urine:2175]    Invasive Devices  Report    Peripheral Intravenous Line  Duration           Peripheral IV 05/24/22 Right Antecubital <1 day          Line  Duration           Arterial Sheath 6 Fr   Right Femoral <1 day                  Physical Exam:  GEN: Omayra Alvarado appears well, alert and oriented x 3, pleasant and cooperative   HEENT: pupils equal, round, and reactive to light; extraocular muscles intact  NECK: supple, no carotid bruits or JVD  HEART: regular rhythm, normal S1 and S2, no murmur, no clicks, gallops or rubs   LUNGS: clear to auscultation bilaterally; no wheezes or rhonchi, no rales  ABDOMEN/GI: normal bowel sounds, soft, no tenderness, no distention  EXTREMITIES/Musculoskeltal: peripheral pulses normal; no clubbing, cyanosis, no edema  NEURO: no focal motor findings   SKIN: normal without suspicious lesions on exposed skin              Lab Results:       Results from last 7 days   Lab Units 05/24/22  1003 05/21/22  0507 05/20/22  0453   WBC Thousand/uL 8 32 7 55 8 69   HEMOGLOBIN g/dL 11 6 11 0* 12 2   HEMATOCRIT % 37 3 34 2* 38 9   PLATELETS Thousands/uL 115* 156 204         Results from last 7 days   Lab Units 05/24/22  1003 05/21/22  0507 05/20/22  0453   POTASSIUM mmol/L 3 1* 3 7 3 4*   CHLORIDE mmol/L 105 108 107   CO2 mmol/L 29 28 24   BUN mg/dL 10 10 6   CREATININE mg/dL 0 96 0 88 0 90   CALCIUM mg/dL 9 4 9 2 8 5     Results from last 7 days   Lab Units 05/19/22  1925   INR  0 98       Imaging: I have personally reviewed pertinent reports      EKG/Telemtry:  No events    Scheduled Meds:  Current Facility-Administered Medications   Medication Dose Route Frequency Provider Last Rate    acetaminophen  650 mg Oral Q4H PRN Desirae Moseley, DO      albuterol  2 puff Inhalation Q4H PRN Cassius Busby MD      aspirin  81 mg Oral Daily Desirae Moseley DO      atorvastatin  40 mg Oral Daily With [employer] Olivier, DO      benzonatate  100 mg Oral TID Cassius Busby MD      chlorhexidine  15 mL Mouth/Throat Once Meribeth APOLINAR Leahy      dextromethorphan-guaiFENesin  10 mL Oral Q4H PRN Cassius Busby MD      fentanyl citrate (PF)    PRN [facility], MD      fluticasone-vilanterol  1 puff Inhalation Daily Desirae Moseley DO      furosemide  40 mg Intravenous BID (diuretic) Vaishnavi Wu MD      heparin (porcine)  3-20 Units/kg/hr (Order-Specific) Intravenous Titrated Agustínrajat Chan, DO 19 8 Units/kg/hr (05/24/22 1102)    heparin (porcine)    PRN [facility], MD      insulin lispro  1-5 Units Subcutaneous TID AC Agustín Chan, DO      insulin lispro  1-5 Units Subcutaneous HS Agustín Chan, DO      melatonin  3 mg Oral HS [place] M[place], CRNP      metoprolol tartrate  25 mg Oral Q12H [facility] Agustín Chan, DO      midazolam    PRN [facility], MD      mupirocin  1 application Nasal Once Redford Soles, PA-C      nitroglycerin  0 4 mg Sublingual Q5 Min PRN Agustín Chan, DO      ondansetron  4 mg Intravenous Q6H PRN Agustín Chan, DO      ondansetron    PRN [facility], MD      pantoprazole  40 mg Oral Early Morning Agustín Chan, DO      prasugrel    PRN [facility], MD       Continuous Infusions:  heparin (porcine), 3-20 Units/kg/hr (Order-Specific), Last Rate: 19 8 Units/kg/hr (05/24/22 1102)        VTE Pharmacologic Prophylaxis: Heparin  VTE Mechanical Prophylaxis: sequential compression device    This note was completed in part utilizing Issue direct voice recognition software  Grammatical errors, random word insertion, spelling mistakes, occasional wrong word or "sound-alike" substitutions and incomplete sentences may be an occasional consequence of the system secondary to software limitations, ambient noise and hardware issues  At the time of dictation, efforts were made to edit, clarify and /or correct errors  Please read the chart carefully and recognize, using context, where substitutions have occurred  If you have any questions or concerns about the context, text or information contained within the body of this dictation, please contact myself, the provider, for further clarification

## 2022-05-24 NOTE — ASSESSMENT & PLAN NOTE
· Incidental finding on CT scan:  5 mm nodule in the left lower lobe  · Repeat chest CT scan in 12 months

## 2022-05-24 NOTE — ASSESSMENT & PLAN NOTE
Lab Results   Component Value Date    HGBA1C 6 3 (H) 05/21/2022       Recent Labs     05/23/22  1052 05/23/22  1600 05/23/22  2104 05/24/22  0645   POCGLU 175* 157* 139 140       Blood Sugar Average: Last 72 hrs:  · (P) 929 [id number]     · Metformin on hold while inpatient  · Q i d  Accu-Cheks with SSI coverage  · Monitor and adjust as needed  · Carb controlled diet  · Hypoglycemia protocol in place

## 2022-05-24 NOTE — ASSESSMENT & PLAN NOTE
· Patient presented the emergency room with complaint of chest pain  · History of known severe triple-vessel disease on cardiac catheterization in 4/15/2022  · Presently on IV heparin GTT  · Continue aspirin, beta-blocker and statin  · CT surgery following for possible CABG  · Plan for repeat cardiac catheterization today with possible PCI  · Cardiology following

## 2022-05-24 NOTE — RESPIRATORY THERAPY NOTE
RT Protocol Note  Taye Ramirez 67 y o  female MRN: [id number]  Unit/Bed#: CW2 213-02 Encounter: [id number]    Assessment    Principal Problem:    Type 1 non-ST elevation myocardial infarction (NSTEMI) ([facility])  Active Problems:    Type 2 diabetes mellitus, without long-term current use of insulin (HCC)    Aortoiliac occlusive disease (HCC)    Chronic combined systolic and diastolic heart failure (HCC)    CAD (coronary artery disease)    Lung nodule    Obese      Home Pulmonary Medications:  mdi's       Past Medical History:   Diagnosis Date    Acid reflux     Carotid artery disease (HCC)     Diabetes mellitus (HCC)     NIDDM    Edema     bles    Full dentures     History of hepatitis C     History of shingles     healed    Hx of renal calculi     Hyperlipidemia     Hypertension     Kidney stone     Liver disease     Nocturnal leg cramps     OA (osteoarthritis) of knee     left    PVD (peripheral vascular disease) ([facility])     S/P insertion of iliac artery stent     miles    Urinary incontinence     Wears glasses      Social History     Socioeconomic History    Marital status: Single     Spouse name: Not on file    Number of children: Not on file    Years of education: Not on file    Highest education level: Not on file   Occupational History    Not on file   Tobacco Use    Smoking status: Former Smoker     Quit date: 2001     Years since quittin 4    Smokeless tobacco: Never Used   Vaping Use    Vaping Use: Never used   Substance and Sexual Activity    Alcohol use: Never    Drug use: No    Sexual activity: Not on file   Other Topics Concern    Not on file   Social History Narrative    Not on file     Social Determinants of Health     Financial Resource Strain: Not on file   Food Insecurity: No Food Insecurity    Worried About Running Out of Food in the Last Year: Never true    Ran Out of Food in the Last Year: Never true   Transportation Needs: No Transportation Needs    Lack of Transportation (Medical):  No Lack of Transportation (Non-Medical): No   Physical Activity: Not on file   Stress: Not on file   Social Connections: Not on file   Intimate Partner Violence: Not on file   Housing Stability: Low Risk     Unable to Pay for Housing in the Last Year: No    Number of Places Lived in the Last Year: 1    Unstable Housing in the Last Year: No       Subjective         Objective    Physical Exam:   Assessment Type: Pre-treatment  General Appearance: Alert, Awake  Respiratory Pattern: Normal  Chest Assessment: Chest expansion symmetrical  Bilateral Breath Sounds: Clear  Cough: None  O2 Device: RA    Vitals:  Blood pressure (!) 100/49, pulse 80, temperature 97 5 °F (36 4 °C), resp  rate 16, SpO2 91 %, not currently breastfeeding  Imaging and other studies: I have personally reviewed pertinent reports        O2 Device: RA     Plan    Respiratory Plan: Mild Distress pathway        Resp Comments: (P) udn/resp protocol d/c'd pt takes mdi's at home, no distress noted, breath sounds clear

## 2022-05-24 NOTE — PROGRESS NOTES
[facility]  Progress Note - Mittie Port [birth year], 67 y o  female MRN: [id number]  Unit/Bed#: CW2 213-02 Encounter: [id number]  Primary Care Provider: Dolly Jaimes MD   Date and time admitted to hospital: 5/20/2022  5:15 PM    * Type 1 non-ST elevation myocardial infarction (NSTEMI) [facility])  Assessment & Plan  · Patient presented the emergency room with complaint of chest pain  · History of known severe triple-vessel disease on cardiac catheterization in 4/15/2022  · Presently on IV heparin GTT  · Continue aspirin, beta-blocker and statin  · CT surgery plans for CABG noted, workup underway  · Awaiting cath reports from [facility] for surgical planning   · Cardiology following    Chronic combined systolic and diastolic heart failure ([facility] )  Assessment & Plan  Wt Readings from Last 3 Encounters:   05/19/22 87 8 kg (193 lb 9 oz)   05/10/22 86 4 kg (190 lb 7 6 oz)   03/29/21 88 9 kg (196 lb)     · 2D echo with LVEF 45%  · Cardiology following  · Currently on IV Lasix 40 mg b i d   · Continue with beta-blocker  · Monitor I/O, daily weights  · Less than 2 g salt diet fluid restriction    CAD (coronary artery disease)  Assessment & Plan  [facility] 4/15/2022, [facility]:  Multivessel CAD -- 80% ostial RCA, calcified ostial LM stenosis  99% mLAD affecting origin of small D2  · Continue aspirin beta-blocker statin  · See plan above    Aortoiliac occlusive disease ([facility] )  Assessment & Plan  · Patient with known aortoiliac occlusive disease with bilateral iliac stents  · Follows outpatient with vascular surgery  · Continue aspirin and statin      Lung nodule  Assessment & Plan  · Incidental finding on CT scan:  5 mm nodule in the left lower lobe  · Repeat chest CT scan in 12 months    Type 2 diabetes mellitus, without long-term current use of insulin [facility])  Assessment & Plan  Lab Results   Component Value Date    HGBA1C 6 3 (H) 05/21/2022       Recent Labs     05/23/22  1052 05/23/22  1600 22  0645   POCGLU 175* 157* 139 140       Blood Sugar Average: Last 72 hrs:  · (P) 261 [id number]     · Metformin on hold while inpatient  · Q i d  Accu-Cheks with SSI coverage  · Monitor and adjust as needed  · Carb controlled diet  · Hypoglycemia protocol in place      Obese  Assessment & Plan  · Therapeutic lifestyle modification encouraged  · Outpatient sleep study recommended        VTE Pharmacologic Prophylaxis: VTE Score: 4 Moderate Risk (Score 3-4) - Pharmacological DVT Prophylaxis Ordered: heparin drip  Patient Centered Rounds: I performed bedside rounds with nursing staff today  Discussions with Specialists or Other Care Team Provider: primary RN, case management     Education and Discussions with Family / Patient: Patient declined call to   Time Spent for Care: 15 minutes  More than 50% of total time spent on counseling and coordination of care as described above  Current Length of Stay: 4 day(s)  Current Patient Status: Inpatient   Certification Statement: The patient will continue to require additional inpatient hospital stay due to pending CT surgery plans/recommendations for CABG  Discharge Plan: Anticipate discharge in >72 hrs to discharge location to be determined pending rehab evaluations  Code Status: Level 1 - Full Code    Subjective:   Patient offers no complaints, specifically denies chest pain  She tells me she is going for repeat cardiac catheterization today  Objective:     Vitals:   Temp (24hrs), Av 8 °F (36 6 °C), Min:97 5 °F (36 4 °C), Max:98 1 °F (36 7 °C)    Temp:  [97 5 °F (36 4 °C)-98 1 °F (36 7 °C)] 97 5 °F (36 4 °C)  HR:  [80] 80  Resp:  [16-17] 16  BP: (100-109)/(49-79) 100/79  SpO2:  [91 %] 91 %  There is no height or weight on file to calculate BMI  Input and Output Summary (last 24 hours):      Intake/Output Summary (Last 24 hours) at 2022 1918  Last data filed at 2022 0701  Gross per 24 hour   Intake 500 ml   Output 1300 ml   Net -800 ml       Physical Exam:   Physical Exam  Vitals and nursing note reviewed  Constitutional:       General: She is not in acute distress  Cardiovascular:      Rate and Rhythm: Normal rate and regular rhythm  Pulmonary:      Effort: Pulmonary effort is normal  No respiratory distress  Neurological:      General: No focal deficit present  Mental Status: She is alert and oriented to person, place, and time  Mental status is at baseline            Additional Data:     Labs:  Results from last 7 days   Lab Units 22  0507   WBC Thousand/uL 7 55   HEMOGLOBIN g/dL 11 0*   HEMATOCRIT % 34 2*   PLATELETS Thousands/uL 156   NEUTROS PCT % 54   LYMPHS PCT % 30   MONOS PCT % 13*   EOS PCT % 2     Results from last 7 days   Lab Units 22  0507   SODIUM mmol/L 141   POTASSIUM mmol/L 3 7   CHLORIDE mmol/L 108   CO2 mmol/L 28   BUN mg/dL 10   CREATININE mg/dL 0 88   ANION GAP mmol/L 5   CALCIUM mg/dL 9 2   GLUCOSE RANDOM mg/dL 142*     Results from last 7 days   Lab Units 22  1925   INR  0 98     Results from last 7 days   Lab Units 22  0645 22  2104 22  1600 22  1052 22  0621 22  2106 22  1633 22  1103 22  0624 22  2135 22  1645 22  1611   POC GLUCOSE mg/dl 140 139 157* 175* 164* 149* 162* 164* 128 156* 161* >500*     Results from last 7 days   Lab Units 22  0507   HEMOGLOBIN A1C % 6 3*           Lines/Drains:  Invasive Devices  Report    Peripheral Intravenous Line  Duration           Peripheral IV 22 Right Antecubital <1 day                  Telemetry:  Telemetry Orders (From admission, onward)             48 Hour Telemetry Monitoring  Continuous x 48 hours           References:    Telemetry Guidelines   Question:  Reason for 48 Hour Telemetry  Answer:  Cardiac Surgery                 Telemetry Reviewed: Normal Sinus Rhythm  Indication for Continued Telemetry Use: Awaiting PCI/EP Study/CABG             Imaging: No pertinent imaging reviewed  Recent Cultures (last 7 days):         Last 24 Hours Medication List:   Current Facility-Administered Medications   Medication Dose Route Frequency Provider Last Rate    acetaminophen  650 mg Oral Q4H PRN Oscar Avila DO      albuterol  2 puff Inhalation Q4H PRN Inocencio Du MD      aspirin  81 mg Oral Daily Oscar Avila DO      atorvastatin  40 mg Oral Daily With [email or web address], DO      benzonatate  100 mg Oral TID Inocencio Du MD      chlorhexidine  15 mL Mouth/Throat Once Ulysees Bound, APOLINAR      dextromethorphan-guaiFENesin  10 mL Oral Q4H PRN Inocencio Du MD      fluticasone-vilanterol  1 puff Inhalation Daily Oscar Avila DO      furosemide  40 mg Intravenous BID (diuretic) Nickie Phan MD      heparin (porcine)  3-20 Units/kg/hr (Order-Specific) Intravenous Titrated Oscar Avila DO 17 8 Units/kg/hr (05/24/22 0654)    insulin lispro  1-5 Units Subcutaneous TID AC Oscar Avila DO      insulin lispro  1-5 Units Subcutaneous HS Oscar Avlia DO      melatonin  3 mg Oral HS Doretha Kayser, CRNP      metoprolol tartrate  25 mg Oral Q12H [address] Oscar Avila DO      mupirocin  1 application Nasal Once Ulysees Bound, APOLINAR      nitroglycerin  0 4 mg Sublingual Q5 Min PRN Oscar Avila DO      ondansetron  4 mg Intravenous Q6H PRN Oscar Avila DO      pantoprazole  40 mg Oral Early Morning Oscar Avila DO          Today, Patient Was Seen By: Evelina Meeks PA-C    **Please Note: This note may have been constructed using a voice recognition system  **

## 2022-05-25 LAB
ANION GAP SERPL CALCULATED.3IONS-SCNC: 6 MMOL/L (ref 4–13)
BUN SERPL-MCNC: 9 MG/DL (ref 5–25)
CALCIUM SERPL-MCNC: 9.4 MG/DL (ref 8.3–10.1)
CHLORIDE SERPL-SCNC: 106 MMOL/L (ref 100–108)
CO2 SERPL-SCNC: 28 MMOL/L (ref 21–32)
CREAT SERPL-MCNC: 0.97 MG/DL (ref 0.6–1.3)
ERYTHROCYTE [DISTWIDTH] IN BLOOD BY AUTOMATED COUNT: 15.2 % (ref 11.6–15.1)
GFR SERPL CREATININE-BSD FRML MDRD: 58 ML/MIN/1.73SQ M
GLUCOSE SERPL-MCNC: 138 MG/DL (ref 65–140)
GLUCOSE SERPL-MCNC: 160 MG/DL (ref 65–140)
GLUCOSE SERPL-MCNC: 161 MG/DL (ref 65–140)
GLUCOSE SERPL-MCNC: 212 MG/DL (ref 65–140)
GLUCOSE SERPL-MCNC: 257 MG/DL (ref 65–140)
HCT VFR BLD AUTO: 36.7 % (ref 34.8–46.1)
HGB BLD-MCNC: 11.3 G/DL (ref 11.5–15.4)
MCH RBC QN AUTO: 28.9 PG (ref 26.8–34.3)
MCHC RBC AUTO-ENTMCNC: 30.8 G/DL (ref 31.4–37.4)
MCV RBC AUTO: 94 FL (ref 82–98)
MRSA NOSE QL CULT: NORMAL
PLATELET # BLD AUTO: 102 THOUSANDS/UL (ref 149–390)
PMV BLD AUTO: 12.3 FL (ref 8.9–12.7)
POTASSIUM SERPL-SCNC: 3.3 MMOL/L (ref 3.5–5.3)
RBC # BLD AUTO: 3.91 MILLION/UL (ref 3.81–5.12)
SODIUM SERPL-SCNC: 140 MMOL/L (ref 136–145)
WBC # BLD AUTO: 8.12 THOUSAND/UL (ref 4.31–10.16)

## 2022-05-25 PROCEDURE — 80048 BASIC METABOLIC PNL TOTAL CA: CPT | Performed by: INTERNAL MEDICINE

## 2022-05-25 PROCEDURE — 82948 REAGENT STRIP/BLOOD GLUCOSE: CPT

## 2022-05-25 PROCEDURE — 99231 SBSQ HOSP IP/OBS SF/LOW 25: CPT | Performed by: INTERNAL MEDICINE

## 2022-05-25 PROCEDURE — 99232 SBSQ HOSP IP/OBS MODERATE 35: CPT | Performed by: PHYSICIAN ASSISTANT

## 2022-05-25 PROCEDURE — 85027 COMPLETE CBC AUTOMATED: CPT | Performed by: INTERNAL MEDICINE

## 2022-05-25 RX ORDER — POTASSIUM CHLORIDE 20 MEQ/1
40 TABLET, EXTENDED RELEASE ORAL ONCE
Status: COMPLETED | OUTPATIENT
Start: 2022-05-25 | End: 2022-05-25

## 2022-05-25 RX ORDER — LORATADINE 10 MG/1
10 TABLET ORAL DAILY
Status: DISCONTINUED | OUTPATIENT
Start: 2022-05-25 | End: 2022-05-26 | Stop reason: HOSPADM

## 2022-05-25 RX ORDER — OXYMETAZOLINE HYDROCHLORIDE 0.05 G/100ML
2 SPRAY NASAL EVERY 12 HOURS PRN
Status: DISCONTINUED | OUTPATIENT
Start: 2022-05-25 | End: 2022-05-25

## 2022-05-25 RX ORDER — FLUTICASONE PROPIONATE 50 MCG
1 SPRAY, SUSPENSION (ML) NASAL DAILY
Status: DISCONTINUED | OUTPATIENT
Start: 2022-05-25 | End: 2022-05-26 | Stop reason: HOSPADM

## 2022-05-25 RX ORDER — FUROSEMIDE 40 MG/1
40 TABLET ORAL DAILY
Status: DISCONTINUED | OUTPATIENT
Start: 2022-05-26 | End: 2022-05-26 | Stop reason: HOSPADM

## 2022-05-25 RX ADMIN — OXYMETAZOLINE HYDROCHLORIDE 2 SPRAY: 0.05 SPRAY NASAL at 04:18

## 2022-05-25 RX ADMIN — INSULIN LISPRO 1 UNITS: 100 INJECTION, SOLUTION INTRAVENOUS; SUBCUTANEOUS at 16:55

## 2022-05-25 RX ADMIN — BENZONATATE 100 MG: 100 CAPSULE ORAL at 16:53

## 2022-05-25 RX ADMIN — FLUTICASONE PROPIONATE 1 SPRAY: 50 SPRAY, METERED NASAL at 13:52

## 2022-05-25 RX ADMIN — METOPROLOL TARTRATE 25 MG: 25 TABLET, FILM COATED ORAL at 23:07

## 2022-05-25 RX ADMIN — CLOPIDOGREL BISULFATE 75 MG: 75 TABLET ORAL at 10:30

## 2022-05-25 RX ADMIN — INSULIN LISPRO 1 UNITS: 100 INJECTION, SOLUTION INTRAVENOUS; SUBCUTANEOUS at 23:02

## 2022-05-25 RX ADMIN — ASPIRIN 81 MG: 81 TABLET, COATED ORAL at 10:30

## 2022-05-25 RX ADMIN — LORATADINE 10 MG: 10 TABLET ORAL at 13:52

## 2022-05-25 RX ADMIN — BENZONATATE 100 MG: 100 CAPSULE ORAL at 23:02

## 2022-05-25 RX ADMIN — PANTOPRAZOLE SODIUM 40 MG: 40 TABLET, DELAYED RELEASE ORAL at 06:17

## 2022-05-25 RX ADMIN — ATORVASTATIN CALCIUM 40 MG: 40 TABLET, FILM COATED ORAL at 16:53

## 2022-05-25 RX ADMIN — INSULIN LISPRO 2 UNITS: 100 INJECTION, SOLUTION INTRAVENOUS; SUBCUTANEOUS at 11:19

## 2022-05-25 RX ADMIN — Medication 3 MG: at 23:02

## 2022-05-25 RX ADMIN — INSULIN LISPRO 1 UNITS: 100 INJECTION, SOLUTION INTRAVENOUS; SUBCUTANEOUS at 06:53

## 2022-05-25 RX ADMIN — BENZONATATE 100 MG: 100 CAPSULE ORAL at 10:30

## 2022-05-25 RX ADMIN — FLUTICASONE FUROATE AND VILANTEROL TRIFENATATE 1 PUFF: 100; 25 POWDER RESPIRATORY (INHALATION) at 10:29

## 2022-05-25 RX ADMIN — POTASSIUM CHLORIDE 40 MEQ: 1500 TABLET, EXTENDED RELEASE ORAL at 13:54

## 2022-05-25 NOTE — PLAN OF CARE
Problem: Potential for Falls  Goal: Patient will remain free of falls  Description: INTERVENTIONS:  - Educate patient/family on patient safety including physical limitations  - Instruct patient to call for assistance with activity   - Consult OT/PT to assist with strengthening/mobility   - Keep Call bell within reach  - Keep bed low and locked with side rails adjusted as appropriate  - Keep care items and personal belongings within reach  - Initiate and maintain comfort rounds  - Make Fall Risk Sign visible to staff  - Offer Toileting every 2 Hours, in advance of need  - Initiate/Maintain alarm  - Obtain necessary fall risk management equipment  - Apply yellow socks and bracelet for high fall risk patients  - Consider moving patient to room near nurses station  Outcome: Progressing     Problem: PAIN - ADULT  Goal: Verbalizes/displays adequate comfort level or baseline comfort level  Description: Interventions:  - Encourage patient to monitor pain and request assistance  - Assess pain using appropriate pain scale  - Administer analgesics based on type and severity of pain and evaluate response  - Implement non-pharmacological measures as appropriate and evaluate response  - Consider cultural and social influences on pain and pain management  - Notify physician/advanced practitioner if interventions unsuccessful or patient reports new pain  Outcome: Progressing     Problem: INFECTION - ADULT  Goal: Absence or prevention of progression during hospitalization  Description: INTERVENTIONS:  - Assess and monitor for signs and symptoms of infection  - Monitor lab/diagnostic results  - Monitor all insertion sites, i e  indwelling lines, tubes, and drains  - Monitor endotracheal if appropriate and nasal secretions for changes in amount and color  - [place] appropriate cooling/warming therapies per order  - Administer medications as ordered  - Instruct and encourage patient and family to use good hand hygiene technique  - Identify and instruct in appropriate isolation precautions for identified infection/condition  Outcome: Progressing

## 2022-05-25 NOTE — ASSESSMENT & PLAN NOTE
Lab Results   Component Value Date    HGBA1C 6 3 (H) 05/21/2022       Recent Labs     05/24/22  1114 05/24/22  1608 05/24/22  2111 05/25/22  0630   POCGLU 141* 125 168* 160*       Blood Sugar Average: Last 72 hrs:  · (P) 221 [id number]     · Metformin on hold while inpatient  · Q i d  Accu-Cheks with SSI coverage  · Monitor and adjust as needed  · Carb controlled diet  · Hypoglycemia protocol in place

## 2022-05-25 NOTE — PROGRESS NOTES
[address] [facility]  Progress Note - Stepan Jo [birth year], 67 y o  female MRN: [id number]  Unit/Bed#: CW2 213-02 Encounter: [id number]  Primary Care Provider: Fernando Florian MD   Date and time admitted to hospital: 5/20/2022  5:15 PM    * Type 1 non-ST elevation myocardial infarction (NSTEMI) [facility])  Assessment & Plan  · Patient presented the emergency room with complaint of chest pain  · History of known severe triple-vessel disease on cardiac catheterization in 4/15/2022  · Presently on IV heparin GTT  · Continue aspirin, beta-blocker and statin  · Status post repeat cardiac catheterization 5/24 -- report is pending   · Cardiology and CT surgery following, appreciate ongoing recommendations    Acute on chronic combined systolic and diastolic heart failure ([facility] )  Assessment & Plan  Wt Readings from Last 3 Encounters:   05/19/22 87 8 kg (193 lb 9 oz)   05/10/22 86 4 kg (190 lb 7 6 oz)   03/29/21 88 9 kg (196 lb)     · 2D echo with LVEF 45%  · Cardiology following  · Currently on IV Lasix 40 mg daily  · Continue with beta-blocker  · Monitor I/O, daily weights  · Less than 2 g salt diet fluid restriction    CAD (coronary artery disease)  Assessment & Plan  [facility] 4/15/2022, [facility]:  Multivessel CAD -- 80% ostial RCA, calcified ostial LM stenosis  99% mLAD affecting origin of small D2  · Continue aspirin beta-blocker statin  · See plan above    Aortoiliac occlusive disease ([facility] )  Assessment & Plan  · Patient with known aortoiliac occlusive disease with bilateral iliac stents  · Follows outpatient with vascular surgery  · Continue aspirin and statin      Lung nodule  Assessment & Plan  · Incidental finding on CT scan:  5 mm nodule in the left lower lobe  · Repeat chest CT scan in 12 months    Type 2 diabetes mellitus, without long-term current use of insulin [facility])  Assessment & Plan  Lab Results   Component Value Date    HGBA1C 6 3 (H) 05/21/2022       Recent Labs     05/24/22  1114 22  1608 22  2111 22  0630   POCGLU 141* 125 168* 160*       Blood Sugar Average: Last 72 hrs:  · (P) [id number]     · Metformin on hold while inpatient  · Q i d  Accu-Cheks with SSI coverage  · Monitor and adjust as needed  · Carb controlled diet  · Hypoglycemia protocol in place      Obese  Assessment & Plan  · Therapeutic lifestyle modification encouraged  · Outpatient sleep study recommended      VTE Pharmacologic Prophylaxis: VTE Score: 4 Moderate Risk (Score 3-4) - Pharmacological DVT Prophylaxis Ordered: heparin drip  Patient Centered Rounds: I performed bedside rounds with nursing staff today  Discussions with Specialists or Other Care Team Provider: primary RN, case management     Education and Discussions with Family / Patient: Patient declined call to   Time Spent for Care: 15 minutes  More than 50% of total time spent on counseling and coordination of care as described above  Current Length of Stay: 5 day(s)  Current Patient Status: Inpatient   Certification Statement: The patient will continue to require additional inpatient hospital stay due to pending cardiology/CT surgery plans/recommendations  Discharge Plan: Anticipate discharge in >72 hrs to home  Code Status: Level 1 - Full Code    Subjective:   Patient states last night she started to have runny nose and sneezing  She states cough has improved  Denies chest pain or SOB  Objective:     Vitals:   Temp (24hrs), Av 1 °F (36 7 °C), Min:98 1 °F (36 7 °C), Max:98 1 °F (36 7 °C)    Temp:  [98 1 °F (36 7 °C)] 98 1 °F (36 7 °C)  HR:  [65-82] 66  Resp:  [20-22] 22  BP: ()/(50-68) 83/60  SpO2:  [90 %-95 %] 90 %  There is no height or weight on file to calculate BMI  Input and Output Summary (last 24 hours):      Intake/Output Summary (Last 24 hours) at 2022 1206  Last data filed at 2022 1757  Gross per 24 hour   Intake --   Output 150 ml   Net -150 ml       Physical Exam: Physical Exam  Vitals and nursing note reviewed  Constitutional:       General: She is not in acute distress  Cardiovascular:      Rate and Rhythm: Normal rate and regular rhythm  Pulmonary:      Effort: Pulmonary effort is normal  No respiratory distress  Breath sounds: No wheezing, rhonchi or rales  Neurological:      General: No focal deficit present  Mental Status: She is alert and oriented to person, place, and time  Mental status is at baseline  Additional Data:     Labs:  Results from last 7 days   Lab Units 05/25/22  0530 05/24/22  1003 05/21/22  0507   WBC Thousand/uL 8 12   < > 7 55   HEMOGLOBIN g/dL 11 3*   < > 11 0*   HEMATOCRIT % 36 7   < > 34 2*   PLATELETS Thousands/uL 102*   < > 156   NEUTROS PCT %  --   --  54   LYMPHS PCT %  --   --  30   MONOS PCT %  --   --  13*   EOS PCT %  --   --  2    < > = values in this interval not displayed       Results from last 7 days   Lab Units 05/25/22  0530   SODIUM mmol/L 140   POTASSIUM mmol/L 3 3*   CHLORIDE mmol/L 106   CO2 mmol/L 28   BUN mg/dL 9   CREATININE mg/dL 0 97   ANION GAP mmol/L 6   CALCIUM mg/dL 9 4   GLUCOSE RANDOM mg/dL 138     Results from last 7 days   Lab Units 05/19/22  1925   INR  0 98     Results from last 7 days   Lab Units 05/25/22  1107 05/25/22  0630 05/24/22  2111 05/24/22  1608 05/24/22  1114 05/24/22  0645 05/23/22  2104 05/23/22  1600 05/23/22  1052 05/23/22  0621 05/22/22  2106 05/22/22  1633   POC GLUCOSE mg/dl 257* 160* 168* 125 141* 140 139 157* 175* 164* 149* 162*     Results from last 7 days   Lab Units 05/21/22  0507   HEMOGLOBIN A1C % 6 3*           Lines/Drains:  Invasive Devices  Report    Peripheral Intravenous Line  Duration           Peripheral IV 05/24/22 Right Antecubital 1 day                  Telemetry:  Telemetry Orders (From admission, onward)             48 Hour Telemetry Monitoring  Continuous x 48 hours        References:    Telemetry Guidelines   Question:  Reason for 48 Hour Telemetry Answer:  Cardiac Surgery                 Telemetry Reviewed: Normal Sinus Rhythm  Indication for Continued Telemetry Use: Awaiting PCI/EP Study/CABG             Imaging: No pertinent imaging reviewed  Will follow-up cath report  Recent Cultures (last 7 days):         Last 24 Hours Medication List:   Current Facility-Administered Medications   Medication Dose Route Frequency Provider Last Rate    acetaminophen  650 mg Oral Q4H PRN Nahomi Ballesteross, DO      albuterol  2 puff Inhalation Q4H PRN Jeromy Vee MD      aspirin  81 mg Oral Daily Nahomi Lemus, DO      atorvastatin  40 mg Oral Daily With Tonya Acuña, DO      benzonatate  100 mg Oral TID Jeromy Vee MD      chlorhexidine  15 mL Mouth/Throat Once Jacinta Davis PA-C      clopidogrel  75 mg Oral Daily Liv Atwood, DO      dextromethorphan-guaiFENesin  10 mL Oral Q4H PRN Jeromy Vee MD      fluticasone  1 spray Each Nare Daily Beverley Alvarez PA-C      fluticasone-vilanterol  1 puff Inhalation Daily Nahomi Lemus, DO      furosemide  40 mg Intravenous Daily Nan Heck MD      insulin lispro  1-5 Units Subcutaneous TID [facility] Nahomi Lemus, DO      insulin lispro  1-5 Units Subcutaneous HS Nahomi Lemus, DO      loratadine  10 mg Oral Daily Beverley Alvarez PA-C      melatonin  3 mg Oral HS Corinne Roup, CRNP      metoprolol tartrate  25 mg Oral Q12H [address] Nahomi Lemus, DO      mupirocin  1 application Nasal Once Jacinta APOLINAR Davis      nitroglycerin  0 4 mg Sublingual Q5 Min PRN Nahomi eLmus, DO      ondansetron  4 mg Intravenous Q6H PRN Nahomi Ballesteross, DO      pantoprazole  40 mg Oral Early Morning Nahomi Lemus, DO          Today, Patient Was Seen By: Kinga Goldsmith PA-C    **Please Note: This note may have been constructed using a voice recognition system  **

## 2022-05-25 NOTE — ASSESSMENT & PLAN NOTE
Wt Readings from Last 3 Encounters:   05/19/22 87 8 kg (193 lb 9 oz)   05/10/22 86 4 kg (190 lb 7 6 oz)   03/29/21 88 9 kg (196 lb)     · 2D echo with LVEF 45%  · Cardiology following  · Currently on IV Lasix 40 mg daily  · Continue with beta-blocker  · Monitor I/O, daily weights  · Less than 2 g salt diet fluid restriction

## 2022-05-25 NOTE — PROGRESS NOTES
Progress Note - Cardiology   Stepan Jo 67 y o  female MRN: [id number]  Unit/Bed#: Leslee Marcos 331-02 Encounter: [id number]  05/25/22  1:02 PM    Impression and Plan:    27-year-old with recent diagnosis of left main and 2 vessel-LAD and RCA disease, at [facility], while awaiting further evaluation of chest pains and shortness of breath and presented to [facility] and transferred here for cardiac surgical evaluation  Based on report, found to have ostial left main, proximal to mid LAD, 50% left circ,  55% diagonal 1 and 80% ostial to proximal RCA disease with mild LV dysfunction with ejection fraction of 45-50% on echocardiography  Further evaluation also demonstrated less than 50% right and 50-70% left carotid disease  There was also clinical suspicion for innominate/subclavian disease bilaterally based on blood pressure measurements and gradients but ultimately no subclavian stenosis was found on vascular ultrasonography  Underwent PCI of the left main and LAD yesterday    Plan:    CAD and NSTEMI:  Left main and multivessel CAD, originally was considered for CABG but ultimately underwent PCI of the left main and LAD yesterday  Coronary angiography report not available yet,    Discussion was had between surgeon and interventionalist the day before  Continue aspirin, statin, beta-blocker,  Change Lasix to 40 mg p o  Daily  Peak troponin this admission of 3700    Hypertension:  Controlled, currently hypotensive but euvolemic on exam, hold further Lasix, watch for now,      cough:  No evidence of acute CHF on exam,   renal function remains stable    Diabetes and dyslipidemia, last LDL of 110, non , A1c of 6 3-May 2022   on high-intensity statin at this time    Should be stable for discharge tomorrow   ===================================================================    Chief Complaint: No chief complaint on file          Subjective/Objective     Subjective:  Denies any cardiac complaints, complains of cough    Objective:  No distress    Patient Active Problem List   Diagnosis    Primary osteoarthritis of left knee    Aortoiliac stenosis (HCC)    Arthritis of left knee    Atherosclerosis of native artery of extremity with intermittent claudication (HCC)    Type 2 diabetes mellitus, without long-term current use of insulin (HCC)    Renal artery stenosis (HCC)    Stenosis of carotid artery    Type 2 diabetes mellitus without complication, without long-term current use of insulin (HCC)    Carotid stenosis, asymptomatic, bilateral    Stenosis of left subclavian artery (HCC)    Aortoiliac occlusive disease (HCC)    Elevated troponin level not due to acute coronary syndrome    Hypertension    Acute on chronic combined systolic and diastolic heart failure (HCC)    Viral pneumonia    Type 1 non-ST elevation myocardial infarction (NSTEMI) ([facility]    COPD without exacerbation (HCC)    CAD (coronary artery disease)    Lung nodule    Obese       Vitals: BP (!) 83/60 (BP Location: Right arm)   Pulse 66   Temp 98 1 °F (36 7 °C)   Resp 22   SpO2 90%     No intake/output data recorded  Wt Readings from Last 3 Encounters:   05/19/22 87 8 kg (193 lb 9 oz)   05/10/22 86 4 kg (190 lb 7 6 oz)   03/29/21 88 9 kg (196 lb)       Intake/Output Summary (Last 24 hours) at 5/25/2022 1302  Last data filed at 5/24/2022 1757  Gross per 24 hour   Intake --   Output 150 ml   Net -150 ml     I/O last 3 completed shifts:   In: 500 [P O :480; I V :20]  Out: 1050 [Urine:1050]    Invasive Devices  Report    Peripheral Intravenous Line  Duration           Peripheral IV 05/24/22 Right Antecubital 1 day                  Physical Exam:  GEN: Reynaldo Blood appears well, alert and oriented x 3, pleasant and cooperative   HEENT: pupils equal, round, and reactive to light; extraocular muscles intact  NECK: supple, no carotid bruits or JVD  HEART: regular rhythm, normal S1 and S2, no murmur, no clicks, gallops or rubs   LUNGS: clear to auscultation bilaterally; no wheezes or rhonchi, no rales  ABDOMEN/GI: normal bowel sounds, soft, no tenderness, no distention  EXTREMITIES/Musculoskeltal: peripheral pulses normal; no clubbing, cyanosis, no edema  NEURO: no focal motor findings   SKIN: normal without suspicious lesions on exposed skin              Lab Results:       Results from last 7 days   Lab Units 05/25/22  0530 05/24/22  1003 05/21/22  0507   WBC Thousand/uL 8 12 8 32 7 55   HEMOGLOBIN g/dL 11 3* 11 6 11 0*   HEMATOCRIT % 36 7 37 3 34 2*   PLATELETS Thousands/uL 102* 115* 156         Results from last 7 days   Lab Units 05/25/22  0530 05/24/22  1003 05/21/22  0507   POTASSIUM mmol/L 3 3* 3 1* 3 7   CHLORIDE mmol/L 106 105 108   CO2 mmol/L 28 29 28   BUN mg/dL 9 10 10   CREATININE mg/dL 0 97 0 96 0 88   CALCIUM mg/dL 9 4 9 4 9 2     Results from last 7 days   Lab Units 05/19/22  1925   INR  0 98       Imaging: I have personally reviewed pertinent reports        Scheduled Meds:  Current Facility-Administered Medications   Medication Dose Route Frequency Provider Last Rate    acetaminophen  650 mg Oral Q4H PRN Ravinder Escalante, DO      albuterol  2 puff Inhalation Q4H PRN Marli Robertson MD      aspirin  81 mg Oral Daily Ravinder Escalante, DO      atorvastatin  40 mg Oral Daily With YUM! Renetta Aguayo, DO      benzonatate  100 mg Oral TID Marli Robertson MD      chlorhexidine  15 mL Mouth/Throat Once Aarti Trimble PA-C      clopidogrel  75 mg Oral Daily Erik Manuel DO      dextromethorphan-guaiFENesin  10 mL Oral Q4H PRN Marli Robertson MD      fluticasone  1 spray Each Nare Daily Beverley Alvarez PA-C      fluticasone-vilanterol  1 puff Inhalation Daily Ravinder Escalante DO      furosemide  40 mg Intravenous Daily Marah Torres MD      insulin lispro  1-5 Units Subcutaneous TID [facility] Ravinder Escalante, DO      insulin lispro  1-5 Units Subcutaneous HS Ravinder Escalante, DO      loratadine  10 mg Oral Daily Beverley Alvarez PA-C      melatonin  3 mg Oral HS Jose Daniel NATALIA Shi      metoprolol tartrate  25 mg Oral Q12H [facility] Earvin Latch, DO      mupirocin  1 application Nasal Once Tobi [place] PA-C      nitroglycerin  0 4 mg Sublingual Q5 Min PRN Earvin Latch, DO      ondansetron  4 mg Intravenous Q6H PRN Earvin Latch, DO      pantoprazole  40 mg Oral Early Morning Earvin Latch, DO       Continuous Infusions:       VTE Pharmacologic Prophylaxis: Heparin  VTE Mechanical Prophylaxis: sequential compression device    This note was completed in part utilizing [employer] direct voice recognition software  Grammatical errors, random word insertion, spelling mistakes, occasional wrong word or "sound-alike" substitutions and incomplete sentences may be an occasional consequence of the system secondary to software limitations, ambient noise and hardware issues  At the time of dictation, efforts were made to edit, clarify and /or correct errors  Please read the chart carefully and recognize, using context, where substitutions have occurred  If you have any questions or concerns about the context, text or information contained within the body of this dictation, please contact myself, the provider, for further clarification

## 2022-05-25 NOTE — ASSESSMENT & PLAN NOTE
· Patient presented the emergency room with complaint of chest pain  · History of known severe triple-vessel disease on cardiac catheterization in 4/15/2022  · Presently on IV heparin GTT  · Continue aspirin, beta-blocker and statin  · Status post repeat cardiac catheterization 5/24 -- report is pending   · Cardiology and CT surgery following, appreciate ongoing recommendations

## 2022-05-25 NOTE — UTILIZATION REVIEW
Continued Stay Review    Date: 5/25/22                          Current Patient Class: inpatient  Current Level of Care: med srug    HPI:72 y o  female initially admitted on 5/20/22 NSTEMI, HF    Assessment/Plan:  Pt states cough has improved, denies chest pain or SOB, started w/ runny nose and sneezing last night  Continue heparin drip, cardiology and cardiac sx following, s/p repeat cardiac cath on 5/24  Continue IV lasix, ASA and BB, monitor IO and daily wts, less than 2 gm Na diet w/ fluid restriction, continue accuchecks w/ ssi       5/24 Cardiac Cath report:  Left Main   Ost LM lesion is 60% stenosed  JOELLE flow is 3  The lesion is type C, eccentric and focal  The lesion is mildly calcified  Ultrasound (IVUS) was performed on the Ost LM  Minimum lumen diameter: 4 3 mm  ARC Degree: 90  Moderate plaque burden was detected  IVUS has determined that the lesion is calcified and eccentric  75% area stenosis Severe damping with guide catheter   Left Anterior Descending   Prox LAD lesion is 100% stenosed  Culprit lesion  Culprit for clinical presentation  JOELLE flow is 0  The lesion is type C and focal  The lesion is mildly calcified  Ost LM lesion   Stent   Stent was successfully placed  The stent used was a STENT XIENCE SKYPOINT 4 X 8MM  Stent was deployed by way of balloon expansion  Maximum pressure: 12 edel  Inflation time: 10 sec  Post-Intervention Lesion Assessment   The intervention was successful  Post-intervention JOELLE flow is 3  There were no complications  There is a 0% residual stenosis post intervention  Prox LAD lesion   Angioplasty   The balloon used was a CATH BAL PTCA RX TREK 1 5 X 8MM  Maximum pressure: 12 edle  Inflation time: 10 sec  Time obtained: 14:08 EDT  Angioplasty   The balloon used was a CATH BAL PTCA RX TREK 1 5 X 8MM  Maximum pressure: 12 edel  Inflation time: 8 sec  Time obtained: 14:41 EDT  Angioplasty   The balloon used was a CATH BAL PTCA RX TREK 2 X 15MM   Maximum pressure: 8 edel  Inflation time: 10 sec  Time obtained: 14:46 EDT  First reinflation; Max pressure: 10 edel  Inflation time 15 sec  Time obtained: 14:47 EDT  Stent   Stent was successfully placed  The stent used was a STENT XIENCE SKYPOINT 3 X 12MM  Stent was deployed by way of balloon expansion  Maximum pressure: 12 edel  Inflation time: 10 sec  Post-Intervention Lesion Assessment   The intervention was successful  Post-intervention JOELLE flow is 3  There were no complications  There is a 0% residual stenosis post intervention         Vital Signs:   Date/Time Temp Pulse Resp BP MAP (mmHg) SpO2 Calculated FIO2 (%) - Nasal Cannula Nasal Cannula O2 Flow Rate (L/min) O2 Device Patient Position - Orthostatic VS   05/25/22 1031 -- 66 22 83/60 Abnormal  -- -- -- -- -- Lying   05/25/22 0806 -- 65 20 98/60 -- -- -- -- -- Lying   05/25/22 07:29:57 98 1 °F (36 7 °C) 74 -- 82/50 Abnormal  61 90 % -- -- -- --   05/25/22 04:10:05 -- -- -- 144/66 92 -- -- -- -- --   05/24/22 2113 -- 82 -- -- -- -- -- -- -- --   05/24/22 21:12:59 -- 82 20 147/68 94 -- -- -- -- Lying   05/24/22 2102 -- -- -- -- -- 95 % -- -- None (Room air) --   05/24/22 17:13:10 -- -- -- 161/65 97 -- -- -- -- --   05/24/22 13:12:04 -- -- -- -- -- -- 28 2 L/min Nasal cannula --   05/24/22 1212 -- 72 -- 128/59 -- 92 % -- -- None (Room air) Lying   05/24/22 08:56:22 -- -- -- 100/79 86 -- -- -- -- --   05/24/22 0853 -- -- -- 100/79 -- -- -- -- -- --   05/24/22 0100 -- -- -- -- -- -- -- -- None (Room air) --   05/23/22 23:38:36 97 5 °F (36 4 °C) -- 16 100/49 Abnormal  66 -- -- -- -- --   05/23/22 22:23:24 -- -- -- 107/58 74 -- -- -- -- --   05/23/22 2222 -- 80 -- 107/58 -- -- -- -- -- --   05/23/22 2112 -- -- -- -- -- 91 % -- -- None (Room air) --   05/23/22 19:10:34 98 1 °F (36 7 °C) -- 17 109/57 74 -- -- -- -- --   05/23/22 16:02:34 -- -- -- 107/61 76 -- -- -- -- --   05/23/22 08:11:27 97 7 °F (36 5 °C) -- 18 120/46 Abnormal  71 -- -- -- -- --   05/23/22 0100 -- -- -- -- -- -- -- -- None (Room air) --       Pertinent Labs/Diagnostic Results:   Results from last 7 days   Lab Units 05/20/22  1304   SARS-COV-2  Negative     Results from last 7 days   Lab Units 05/25/22  0530 05/24/22  1003 05/21/22  0507 05/20/22  0453 05/19/22  1821   WBC Thousand/uL 8 12 8 32 7 55 8 69 6 65   HEMOGLOBIN g/dL 11 3* 11 6 11 0* 12 2 11 8   HEMATOCRIT % 36 7 37 3 34 2* 38 9 38 8   PLATELETS Thousands/uL 102* 115* 156 204 129*   NEUTROS ABS Thousands/µL  --   --  4 09 4 51 3 90     Results from last 7 days   Lab Units 05/25/22  0530 05/24/22  1003 05/21/22  0507 05/20/22  0453 05/19/22  1821   SODIUM mmol/L 140 139 141 142 141   POTASSIUM mmol/L 3 3* 3 1* 3 7 3 4* 3 3*   CHLORIDE mmol/L 106 105 108 107 107   CO2 mmol/L 28 29 28 24 22   ANION GAP mmol/L 6 5 5 11 12   BUN mg/dL 9 10 10 6 7   CREATININE mg/dL 0 97 0 96 0 88 0 90 1 05   EGFR ml/min/1 73sq m 58 58 65 63 52   CALCIUM mg/dL 9 4 9 4 9 2 8 5 8 9     Results from last 7 days   Lab Units 05/25/22  1107 05/25/22  0630 05/24/22  2111 05/24/22  1608 05/24/22  1114 05/24/22  0645 05/23/22  2104 05/23/22  1600 05/23/22  1052 05/23/22  0621 05/22/22  2106 05/22/22  1633   POC GLUCOSE mg/dl 257* 160* 168* 125 141* 140 139 157* 175* 164* 149* 162*     Results from last 7 days   Lab Units 05/25/22  0530 05/24/22  1003 05/21/22  0507 05/20/22  0453 05/19/22  1821   GLUCOSE RANDOM mg/dL 138 140 142* 127 140     Results from last 7 days   Lab Units 05/21/22  0507   HEMOGLOBIN A1C % 6 3*   EAG mg/dl 134     Results from last 7 days   Lab Units 05/19/22  2245 05/19/22  2030 05/19/22  1821   HS TNI 0HR ng/L  --   --  67*   HS TNI 2HR ng/L  --  482*  --    HSTNI D2 ng/L  --  415*  --    HS TNI 4HR ng/L 732*  --   --    HSTNI D4 ng/L 665*  --   --      Results from last 7 days   Lab Units 05/24/22  1003 05/24/22  0103 05/23/22  1712 05/19/22  1925   PROTIME seconds  --   --   --  12 7   INR   --   --   --  0 98   PTT seconds 48* 49* 33 21*     Results from last 7 days Lab Units 05/19/22  1821   NT-PRO BNP pg/mL 5,497*     Results from last 7 days   Lab Units 05/21/22  1341   CREATININE UR mg/dL 101 0     Results from last 7 days   Lab Units 05/20/22  1304   INFLUENZA A PCR  Negative   INFLUENZA B PCR  Negative   RSV PCR  Negative     Medications:   Scheduled Medications:  aspirin, 81 mg, Oral, Daily  atorvastatin, 40 mg, Oral, Daily With Dinner  benzonatate, 100 mg, Oral, TID  chlorhexidine, 15 mL, Mouth/Throat, Once  clopidogrel, 75 mg, Oral, Daily  fluticasone, 1 spray, Each Nare, Daily  fluticasone-vilanterol, 1 puff, Inhalation, Daily  furosemide, 40 mg, Intravenous, Daily  insulin lispro, 1-5 Units, Subcutaneous, TID AC  insulin lispro, 1-5 Units, Subcutaneous, HS  loratadine, 10 mg, Oral, Daily  melatonin, 3 mg, Oral, HS  metoprolol tartrate, 25 mg, Oral, Q12H THERESA  mupirocin, 1 application, Nasal, Once  pantoprazole, 40 mg, Oral, Early Morning      Continuous IV Infusions: none     PRN Meds:  acetaminophen, 650 mg, Oral, Q4H PRN  albuterol, 2 puff, Inhalation, Q4H PRN  dextromethorphan-guaiFENesin, 10 mL, Oral, Q4H PRN  nitroglycerin, 0 4 mg, Sublingual, Q5 Min PRN  ondansetron, 4 mg, Intravenous, Q6H PRN        Discharge Plan: [facility]    Network Utilization Review Department  ATTENTION: Please call with any questions or concerns to [phone] and carefully listen to the prompts so that you are directed to the right person  All voicemails are confidential   Renny Salcido all requests for admission clinical reviews, approved or denied determinations and any other requests to dedicated fax number below belonging to the campus where the patient is receiving treatment   List of dedicated fax numbers for the Facilities:  [address] DENIALS (Administrative/Medical Necessity) [phone]   [address]  (Maternity/NICU/Pediatrics) [phone] [address] [phone]   [address] [phone]     [address] [facility] [place] [address] [address] [place] [address] [address] [place] [address] [phone]

## 2022-05-25 NOTE — ASSESSMENT & PLAN NOTE
[facility] 4/15/2022, [facility]:  Multivessel CAD -- 80% ostial RCA, calcified ostial LM stenosis  99% mLAD affecting origin of small D2  · Continue aspirin beta-blocker statin  · See plan above

## 2022-05-26 VITALS
RESPIRATION RATE: 18 BRPM | OXYGEN SATURATION: 89 % | HEART RATE: 77 BPM | TEMPERATURE: 97.6 F | DIASTOLIC BLOOD PRESSURE: 72 MMHG | SYSTOLIC BLOOD PRESSURE: 116 MMHG

## 2022-05-26 PROBLEM — Z86.718 HISTORY OF DVT (DEEP VEIN THROMBOSIS): Status: ACTIVE | Noted: 2022-05-26

## 2022-05-26 LAB
ANION GAP SERPL CALCULATED.3IONS-SCNC: 9 MMOL/L (ref 4–13)
BUN SERPL-MCNC: 9 MG/DL (ref 5–25)
CALCIUM SERPL-MCNC: 9.1 MG/DL (ref 8.3–10.1)
CHLORIDE SERPL-SCNC: 109 MMOL/L (ref 100–108)
CO2 SERPL-SCNC: 22 MMOL/L (ref 21–32)
CREAT SERPL-MCNC: 1 MG/DL (ref 0.6–1.3)
GFR SERPL CREATININE-BSD FRML MDRD: 56 ML/MIN/1.73SQ M
GLUCOSE SERPL-MCNC: 133 MG/DL (ref 65–140)
GLUCOSE SERPL-MCNC: 150 MG/DL (ref 65–140)
GLUCOSE SERPL-MCNC: 203 MG/DL (ref 65–140)
POTASSIUM SERPL-SCNC: 3.8 MMOL/L (ref 3.5–5.3)
SODIUM SERPL-SCNC: 140 MMOL/L (ref 136–145)

## 2022-05-26 PROCEDURE — 99231 SBSQ HOSP IP/OBS SF/LOW 25: CPT | Performed by: INTERNAL MEDICINE

## 2022-05-26 PROCEDURE — 82948 REAGENT STRIP/BLOOD GLUCOSE: CPT

## 2022-05-26 PROCEDURE — 80048 BASIC METABOLIC PNL TOTAL CA: CPT | Performed by: PHYSICIAN ASSISTANT

## 2022-05-26 PROCEDURE — 99239 HOSP IP/OBS DSCHRG MGMT >30: CPT | Performed by: INTERNAL MEDICINE

## 2022-05-26 RX ORDER — ASPIRIN 81 MG/1
81 TABLET, CHEWABLE ORAL DAILY
Qty: 30 TABLET | Refills: 0 | Status: SHIPPED | OUTPATIENT
Start: 2022-05-26 | End: 2022-06-01

## 2022-05-26 RX ORDER — ATORVASTATIN CALCIUM 40 MG/1
40 TABLET, FILM COATED ORAL
Qty: 30 TABLET | Refills: 0 | Status: SHIPPED | OUTPATIENT
Start: 2022-05-26 | End: 2022-06-01

## 2022-05-26 RX ORDER — CLOPIDOGREL BISULFATE 75 MG/1
75 TABLET ORAL DAILY
Qty: 30 TABLET | Refills: 0 | Status: SHIPPED | OUTPATIENT
Start: 2022-05-27 | End: 2022-06-01

## 2022-05-26 RX ORDER — NITROGLYCERIN 0.4 MG/1
0.4 TABLET SUBLINGUAL
Qty: 20 TABLET | Refills: 0 | Status: SHIPPED | OUTPATIENT
Start: 2022-05-26 | End: 2022-06-01

## 2022-05-26 RX ORDER — FUROSEMIDE 40 MG/1
40 TABLET ORAL DAILY
Qty: 30 TABLET | Refills: 0 | Status: SHIPPED | OUTPATIENT
Start: 2022-05-26 | End: 2022-06-01

## 2022-05-26 RX ADMIN — INSULIN LISPRO 1 UNITS: 100 INJECTION, SOLUTION INTRAVENOUS; SUBCUTANEOUS at 06:39

## 2022-05-26 RX ADMIN — ASPIRIN 81 MG: 81 TABLET, COATED ORAL at 08:51

## 2022-05-26 RX ADMIN — INSULIN LISPRO 1 UNITS: 100 INJECTION, SOLUTION INTRAVENOUS; SUBCUTANEOUS at 11:39

## 2022-05-26 RX ADMIN — CLOPIDOGREL BISULFATE 75 MG: 75 TABLET ORAL at 08:51

## 2022-05-26 RX ADMIN — BENZONATATE 100 MG: 100 CAPSULE ORAL at 08:51

## 2022-05-26 RX ADMIN — PANTOPRAZOLE SODIUM 40 MG: 40 TABLET, DELAYED RELEASE ORAL at 06:38

## 2022-05-26 RX ADMIN — FLUTICASONE PROPIONATE 1 SPRAY: 50 SPRAY, METERED NASAL at 08:55

## 2022-05-26 RX ADMIN — FLUTICASONE FUROATE AND VILANTEROL TRIFENATATE 1 PUFF: 100; 25 POWDER RESPIRATORY (INHALATION) at 08:55

## 2022-05-26 RX ADMIN — LORATADINE 10 MG: 10 TABLET ORAL at 08:51

## 2022-05-26 NOTE — CASE MANAGEMENT
Case Management Discharge Planning Note    Patient name Meredith Olguin CW2 213/CW2 213-02 MRN [id number]  : 1950 Date 2022       Current Admission Date: 2022  Current Admission Diagnosis:Type 1 non-ST elevation myocardial infarction (NSTEMI) [facility])   Patient Active Problem List    Diagnosis Date Noted    History of DVT (deep vein thrombosis) 2022    Obese 2022    CAD (coronary artery disease) 2022    Lung nodule 2022    Type 1 non-ST elevation myocardial infarction (NSTEMI) ([facility] ) 2022    COPD without exacerbation ([facility] ) 2022    Viral pneumonia 2022    Elevated troponin level not due to acute coronary syndrome 2022    Hypertension 2022    Acute on chronic combined systolic and diastolic heart failure ([facility] ) 2022    Carotid stenosis, asymptomatic, bilateral 2020    Stenosis of left subclavian artery ([facility] ) 2020    Aortoiliac occlusive disease ([facility] ) 2020    Type 2 diabetes mellitus, without long-term current use of insulin ([facility] ) 10/29/2019    Type 2 diabetes mellitus without complication, without long-term current use of insulin ([facility] ) 2019    Primary osteoarthritis of left knee 2017    Arthritis of left knee 2016    Aortoiliac stenosis ([facility] ) 10/05/2015    Renal artery stenosis ([facility] ) 10/05/2015    Atherosclerosis of native artery of extremity with intermittent claudication ([facility] ) 2014    Stenosis of carotid artery 2014      LOS (days): 6  Geometric Mean LOS (GMLOS) (days): 3 90  Days to GMLOS:-1 9     OBJECTIVE:  Risk of Unplanned Readmission Score: 12 96         Current admission status: Inpatient   Preferred Pharmacy:   [facility] - [address]  [address] [facility] [address]  Phone: [phone] Fax: [phone] [id number] [place] [address] [address] [place] [address]     [address] [place] [address]  Phone: [phone] Fax: [phone]    Primary Care Provider: Shawna Hook MD    Primary Insurance: Juan Luis Garcia [facility]  Secondary Insurance:     DISCHARGE DETAILS:                 Treatment Team Recommendation: Home            IMM Given (Date):: 05/26/22  IMM Given to[de-identified] Patient     Additional Comments: Patient cleared for discharge, no needs identified  Patient's daughter to provide d/c transportation  IMM #2 provided, patient signed, placed in chart

## 2022-05-26 NOTE — ASSESSMENT & PLAN NOTE
Patient presented the emergency room with complaint of chest pain  · History of known severe triple-vessel disease on cardiac catheterization in 4/15/2022  · CT surgery initially planned for CABG however then ultimately underwent PCI of L main/LAD  · Continue aspirin, plavix, beta-blocker and statin   Stopping home Eliquis to avoid triple therapy (was on this for DVT in Oct 2021 from what I can find in records)

## 2022-05-26 NOTE — DISCHARGE SUMMARY
[facility]  Discharge- Swetha Audrey [birth year], 67 y o  female MRN: [id number]  Unit/Bed#: Cesar Pinedo 213-02 Encounter: [id number]  Primary Care Provider: Belkis Vail MD   Date and time admitted to hospital: 5/20/2022  5:15 PM    * Type 1 non-ST elevation myocardial infarction (NSTEMI) [facility])  Assessment & Plan  Patient presented the emergency room with complaint of chest pain  · History of known severe triple-vessel disease on cardiac catheterization in 4/15/2022  · CT surgery initially planned for CABG however then ultimately underwent PCI of L main/LAD  · Continue aspirin, plavix, beta-blocker and statin   Stopping home Eliquis to avoid triple therapy (was on this for DVT in Oct 2021 from what I can find in records)    History of DVT (deep vein thrombosis)  Assessment & Plan  · Diagnosed October 2021--Was on eliquis previously  · Now stopping Eliquis in favor of plavix/asa     CAD (coronary artery disease)  Assessment & Plan  [facility] 4/15/2022, [facility]:  Multivessel CAD -- 80% ostial RCA, calcified ostial LM stenosis  99% mLAD affecting origin of small D2  · Continue aspirin, plavix, beta-blocker statin  · See plan above    Acute on chronic combined systolic and diastolic heart failure (HCC)  Assessment & Plan  Wt Readings from Last 3 Encounters:   05/19/22 87 8 kg (193 lb 9 oz)   05/10/22 86 4 kg (190 lb 7 6 oz)   03/29/21 88 9 kg (196 lb)     2D echo with LVEF 45%  · Cardiology following, S/P IV lasix now on hold--rec dc on Lasix 40 mg PO daily per cards   · Continue with beta-blocker  · Monitor I/O, daily weights  · Less than 2 g salt diet fluid restriction    Obese  Assessment & Plan  · Therapeutic lifestyle modification encouraged  · Outpatient sleep study recommended    Lung nodule  Assessment & Plan  Incidental finding on CT scan:  5 mm nodule in the left lower lobe  · Repeat chest CT scan in 12 months    Aortoiliac occlusive disease ([facility] )  Assessment & Plan  Patient with known aortoiliac occlusive disease with bilateral iliac stents  · Follows outpatient with vascular surgery  · Continue aspirin and statin      Type 2 diabetes mellitus, without long-term current use of insulin [facility]  Assessment & Plan  Lab Results   Component Value Date    HGBA1C 6 3 (H) 05/21/2022       Recent Labs     05/25/22  1107 05/25/22  1602 05/25/22  2133 05/26/22  0557   POCGLU 257* 161* 212* 150*       Blood Sugar Average: Last 72 hrs:  · (P) 361 [id number]     · Metformin on hold while inpatient  · Q i d  Accu-Cheks with SSI coverage  · Monitor and adjust as needed  · Carb controlled diet  · Hypoglycemia protocol in place        Medical Problems             Resolved Problems  Date Reviewed: 5/26/2022   None               Discharging Physician / Practitioner: Dylan Heredia PA-C  PCP: Carolina Christianson MD  Admission Date:   Admission Orders (From admission, onward)     Ordered        05/20/22 1720  Inpatient Admission  Once                      Discharge Date: 05/26/22    Consultations During Hospital Stay:  · Cardiac surgery  · Cardiology    Procedures Performed:   · Cardiac catheterization 5/24    Significant Findings / Test Results:   · See above    Incidental Findings:   · See above     Test Results Pending at Discharge (will require follow up): · None     Outpatient Tests Requested:  · Follow-up with cardiology  · Follow-up with PCP    Complications:  None    Reason for Admission:  Transfer from [facility] for multivessel CAD    Hospital Course:   Chanda Mcintosh is a 67 y o  female patient with a past medical history of DVT diagnosed October 2021 on Eliquis, GERD, hypertension, hyperlipidemia, history of CVA, history of PAD status post iliac stenting in the past, carotid artery stenosis who originally presented to the hospital on 5/20/2022 as transfer from [facility] given MV CAD       She initially presented with 1 week of worsening chest pain and shortness of breath though she reported her symptoms initially began 1 month ago, with chest pain prompting admission to [facility], where she was treated for pneumonia  Echo there showed EF 45% and apical wall motion abnormality, prompting cardiac catheterization       She was found to have multivessel CAD with 65% oLM occlusion, 8-% ostial RCA, and 99% prox-mid LAD disease  She was in the process of being evaluated for CABG when last week her symptoms of chest pain began to worsen  She presented to [facility] where she was noted to have rising troponin with ECG changes prompting transfer to [place]  There was initially planned for CABG however ultimately she underwent PCI on 5/24 instead  Spoke with Cardiology on day of discharge who cleared patient  They agreed with stopping Eliquis which was for a DVT in October of 2021 in favor of aspirin/for her stent  She should follow up with Cardiology as an outpatient  Should also follow up with primary care doctor  Please see above list of diagnoses and related plan for additional information  Condition at Discharge: stable    Discharge Day Visit / Exam:   Subjective:  Doing fine  Denied any complaints earlier this morning when asked  Vitals: Blood Pressure: 116/72 (05/26/22 1040)  Pulse: 77 (05/26/22 0851)  Temperature: 97 6 °F (36 4 °C) (05/26/22 1040)  Temp Source: Oral (05/26/22 0806)  Respirations: 18 (05/26/22 0806)  SpO2: (!) 89 % (05/26/22 0806)  Exam:   Physical Exam  Vitals and nursing note reviewed  Constitutional:       General: She is not in acute distress  Cardiovascular:      Rate and Rhythm: Normal rate and regular rhythm  Abdominal:      General: There is no distension  Musculoskeletal:      Right lower leg: No edema  Left lower leg: No edema  Neurological:      Mental Status: She is oriented to person, place, and time     Psychiatric:         Mood and Affect: Mood normal           Discussion with Family: Patient declined call to   Discharge instructions/Information to patient and family:   See after visit summary for information provided to patient and family  Provisions for Follow-Up Care:  See after visit summary for information related to follow-up care and any pertinent home health orders  Disposition:   Home    Planned Readmission:  No     Discharge Statement:  I spent 34 minutes discharging the patient  This time was spent on the day of discharge  I had direct contact with the patient on the day of discharge  Greater than 50% of the total time was spent examining patient, answering all patient questions, arranging and discussing plan of care with patient as well as directly providing post-discharge instructions  Additional time then spent on discharge activities  Discharge Medications:  See after visit summary for reconciled discharge medications provided to patient and/or family        **Please Note: This note may have been constructed using a voice recognition system**

## 2022-05-26 NOTE — ASSESSMENT & PLAN NOTE
Lab Results   Component Value Date    HGBA1C 6 3 (H) 05/21/2022       Recent Labs     05/25/22  1107 05/25/22  1602 05/25/22  2133 05/26/22  0557   POCGLU 257* 161* 212* 150*       Blood Sugar Average: Last 72 hrs:  · (P) 579 [id number]     · Metformin on hold while inpatient  · Q i d  Accu-Cheks with SSI coverage  · Monitor and adjust as needed  · Carb controlled diet  · Hypoglycemia protocol in place

## 2022-05-26 NOTE — DISCHARGE INSTRUCTIONS
Do not stop your aspirin and plavix  Contact cardiologist for follow up appointment! We are stopping your eliquis for now because you are on aspirin and plavix and it appears it has been >6 months since your blood clot that you were placed on this medication for  Please speak with your cardiologist about this

## 2022-05-26 NOTE — ASSESSMENT & PLAN NOTE
[facility] 4/15/2022, [facility]:  Multivessel CAD -- 80% ostial RCA, calcified ostial LM stenosis  99% mLAD affecting origin of small D2  · Continue aspirin, plavix, beta-blocker statin  · See plan above

## 2022-05-26 NOTE — ASSESSMENT & PLAN NOTE
Wt Readings from Last 3 Encounters:   05/19/22 87 8 kg (193 lb 9 oz)   05/10/22 86 4 kg (190 lb 7 6 oz)   03/29/21 88 9 kg (196 lb)     2D echo with LVEF 45%  · Cardiology following, S/P IV lasix now on hold--rec dc on Lasix 40 mg PO daily per cards   · Continue with beta-blocker  · Monitor I/O, daily weights  · Less than 2 g salt diet fluid restriction

## 2022-05-26 NOTE — ASSESSMENT & PLAN NOTE
Incidental finding on CT scan:  5 mm nodule in the left lower lobe  · Repeat chest CT scan in 12 months

## 2022-05-26 NOTE — INCIDENTAL FINDINGS
The following findings require follow up:  Radiographic finding    Findin mm nodule in the left lower lobe  Based on current Fleischner Society 2017 Guidelines on incidental pulmonary nodule, no routine follow-up is needed if the patient is low risk    If the patient is high risk, optional follow-up chest CT at 12 months   can be considered    Follow up required: repeat CT chest 12 months   Follow up should be done within 12 months    Please notify the following clinician to assist with the follow up:   Dr Bal Persons

## 2022-05-26 NOTE — ASSESSMENT & PLAN NOTE
Patient with known aortoiliac occlusive disease with bilateral iliac stents  · Follows outpatient with vascular surgery  · Continue aspirin and statin

## 2022-05-26 NOTE — PROGRESS NOTES
Progress Note - Cardiology   Sergo May 67 y o  female MRN: [id number]  Unit/Bed#: Annmarie Taylor 278-02 Encounter: [id number]  05/26/22  10:57 AM    Impression and Plan:    65-year-old with recent diagnosis of left main and 2 vessel-LAD and RCA disease, at [facility], while awaiting further evaluation of chest pains and shortness of breath and presented to [facility]  and transferred here for cardiac surgical evaluation  Based on report, found to have ostial left main, proximal to mid LAD, 50% left circ,  55% diagonal 1 and 80% ostial to proximal RCA disease with mild LV dysfunction with ejection fraction of 45-50% on echocardiography  Further evaluation also demonstrated less than 50% right and 50-70% left carotid disease  There was also clinical suspicion for innominate/subclavian disease bilaterally based on blood pressure measurements and gradients but ultimately no subclavian stenosis was found on vascular ultrasonography  Underwent PCI of the left main and LAD yesterday    Plan:    CAD and NSTEMI:  Left main and multivessel CAD, originally was considered for CABG but ultimately underwent PCI of the 60% left main and proximal LAD on 5/24/2022 with NEELA, with good result     Prior to that-Discussion was had between surgeon and interventionalist the day before  Continue aspirin, statin, beta-blocker,  Changed Lasix to 40 mg p o   Daily-this will be her discharge dose  Peak troponin this admission of 3700    Hypertension:  Controlled,      cough:  No evidence of acute CHF on exam, cough has improved, Lasix 40 mg daily at discharge  Euvolemic on exam   renal function remains stable    Diabetes and dyslipidemia, last LDL of 110, non , A1c of 6 3-May 2022   on high-intensity statin at this time    Stable for discharge from a cardiac standpoint on current cardiac medications-aspirin, Plavix, statin, a beta-blocker, Lasix 40 mg daily  ===================================================================    Chief Complaint: No chief complaint on file  Subjective/Objective     Subjective:  Denies any cardiac complaints, complains of cough    Objective:  No distress    Patient Active Problem List   Diagnosis    Primary osteoarthritis of left knee    Aortoiliac stenosis (HCC)    Arthritis of left knee    Atherosclerosis of native artery of extremity with intermittent claudication (HCC)    Type 2 diabetes mellitus, without long-term current use of insulin (HCC)    Renal artery stenosis (HCC)    Stenosis of carotid artery    Type 2 diabetes mellitus without complication, without long-term current use of insulin (HCC)    Carotid stenosis, asymptomatic, bilateral    Stenosis of left subclavian artery (HCC)    Aortoiliac occlusive disease (HCC)    Elevated troponin level not due to acute coronary syndrome    Hypertension    Acute on chronic combined systolic and diastolic heart failure (HCC)    Viral pneumonia    Type 1 non-ST elevation myocardial infarction (NSTEMI) ([facility] )    COPD without exacerbation (HCC)    CAD (coronary artery disease)    Lung nodule    Obese    History of DVT (deep vein thrombosis)       Vitals: /72   Pulse 77   Temp 97 6 °F (36 4 °C)   Resp 18   SpO2 (!) 89%     No intake/output data recorded  Wt Readings from Last 3 Encounters:   05/19/22 87 8 kg (193 lb 9 oz)   05/10/22 86 4 kg (190 lb 7 6 oz)   03/29/21 88 9 kg (196 lb)       Intake/Output Summary (Last 24 hours) at 5/26/2022 1057  Last data filed at 5/25/2022 1601  Gross per 24 hour   Intake 340 ml   Output 250 ml   Net 90 ml     I/O last 3 completed shifts:   In: 65 [P O :660]  Out: 250 [Urine:250]    Invasive Devices  Report    Peripheral Intravenous Line  Duration           Peripheral IV 05/24/22 Right Antecubital 2 days                  Physical Exam:  GEN: Alvino Mota appears well, alert and oriented x 3, pleasant and cooperative   HEENT: pupils equal, round, and reactive to light; extraocular muscles intact  NECK: supple, no carotid bruits or JVD  HEART: regular rhythm, normal S1 and S2, no murmur, no clicks, gallops or rubs   LUNGS: clear to auscultation bilaterally; no wheezes or rhonchi, no rales  ABDOMEN/GI: normal bowel sounds, soft, no tenderness, no distention  EXTREMITIES/Musculoskeltal: peripheral pulses normal; no clubbing, cyanosis, no edema  NEURO: no focal motor findings   SKIN: normal without suspicious lesions on exposed skin              Lab Results:       Results from last 7 days   Lab Units 05/25/22  0530 05/24/22  1003 05/21/22  0507   WBC Thousand/uL 8 12 8 32 7 55   HEMOGLOBIN g/dL 11 3* 11 6 11 0*   HEMATOCRIT % 36 7 37 3 34 2*   PLATELETS Thousands/uL 102* 115* 156         Results from last 7 days   Lab Units 05/26/22  0519 05/25/22  0530 05/24/22  1003   POTASSIUM mmol/L 3 8 3 3* 3 1*   CHLORIDE mmol/L 109* 106 105   CO2 mmol/L 22 28 29   BUN mg/dL 9 9 10   CREATININE mg/dL 1 00 0 97 0 96   CALCIUM mg/dL 9 1 9 4 9 4     Results from last 7 days   Lab Units 05/19/22  1925   INR  0 98       Imaging: I have personally reviewed pertinent reports        Scheduled Meds:  Current Facility-Administered Medications   Medication Dose Route Frequency Provider Last Rate    acetaminophen  650 mg Oral Q4H PRN [facility], DO      albuterol  2 puff Inhalation Q4H PRN Alec Paz MD      aspirin  81 mg Oral Daily [facility], DO      atorvastatin  40 mg Oral Daily [employer], DO      benzonatate  100 mg Oral TID Alec Paz MD      chlorhexidine  15 mL Mouth/Throat Once Meredith Jo PA-C      clopidogrel  75 mg Oral Daily Dominickleyda Fletcher, DO      dextromethorphan-guaiFENesin  10 mL Oral Q4H PRN Alec Paz MD      fluticasone  1 spray Each Nare Daily Beverley Alvarez PA-C      fluticasone-vilanterol  1 puff Inhalation Daily [facility], DO      furosemide  40 mg Oral Daily Phil Mckeon MD      insulin lispro  1-5 Units Subcutaneous TID [facility] Kaley France, DO      insulin lispro  1-5 Units Subcutaneous HS Kaley France, DO      loratadine  10 mg Oral Daily Beverley Alvarez PA-C      melatonin  3 mg Oral HS NATALIA Appiah Cea      metoprolol tartrate  25 mg Oral Q12H [facility] Kaley France, DO      mupirocin  1 application Nasal Once Karan Walker PA-C      nitroglycerin  0 4 mg Sublingual Q5 Min PRN Kaley France, DO      ondansetron  4 mg Intravenous Q6H PRN Kaley France, DO      pantoprazole  40 mg Oral Early Morning Kaleybrandy France, DO       Continuous Infusions:       VTE Pharmacologic Prophylaxis: Heparin  VTE Mechanical Prophylaxis: sequential compression device    This note was completed in part utilizing m-[employer] direct voice recognition software  Grammatical errors, random word insertion, spelling mistakes, occasional wrong word or "sound-alike" substitutions and incomplete sentences may be an occasional consequence of the system secondary to software limitations, ambient noise and hardware issues  At the time of dictation, efforts were made to edit, clarify and /or correct errors  Please read the chart carefully and recognize, using context, where substitutions have occurred  If you have any questions or concerns about the context, text or information contained within the body of this dictation, please contact myself, the provider, for further clarification

## 2022-05-31 ENCOUNTER — TELEPHONE (OUTPATIENT)
Dept: CARDIOLOGY CLINIC | Facility: CLINIC | Age: 72
End: 2022-05-31

## 2022-05-31 ENCOUNTER — TELEPHONE (OUTPATIENT)
Dept: VASCULAR SURGERY | Facility: CLINIC | Age: 72
End: 2022-05-31

## 2022-05-31 DIAGNOSIS — I70.211 ATHEROSCLEROSIS OF NATIVE ARTERY OF RIGHT LOWER EXTREMITY WITH INTERMITTENT CLAUDICATION (HCC): Primary | ICD-10-CM

## 2022-05-31 NOTE — TELEPHONE ENCOUNTER
Pt called stating ever since she had cardiac cath w/ intervention 5/24/22 she has bilateral calf muscle pain  She states she woke up from procedure with the pain  No pain at rest but if she moves her leg or stretches she can feel ache, R >L  If she ambulates it worsens after one block, especially on the R  She denies rest pain, no discoloration/ulcers/wounds  Pt is concerned "they did something when they went into my R groin"  Pt had LEAD 5/22/22 along w/ UEV doppler  Advised I would route her concerns to our triage provider and we would get back to her

## 2022-05-31 NOTE — TELEPHONE ENCOUNTER
Spoke to pt she wants to see her PCP before she comes here she will call us back to schedule pt also sees [facility] cardiology pt seems alittle confised but we will wait to here from her

## 2022-05-31 NOTE — TELEPHONE ENCOUNTER
Chart reviewed  Given that calf pain is bilateral, most likely not related to procedure  In review of notes, patient has aortoiliac occlusive disease with restenosis in the R NOY and high-grade stenosis versus occlusion of the L NOY  She complained of claudication symptoms in the past   Is this different? Worse? LEAD prior to procedure was without significant the disease    Can obtain R LEAD for completeness

## 2022-05-31 NOTE — TELEPHONE ENCOUNTER
Pt returned call, informed her R LEAD is recommended, pt agrees to schedule  ? If this claudication/calf pain different then what she previously c/o, pt states she has not had calf pain for years, she had it then it went away, it returned after cardiac cath  Transf to call center to schedule doppler

## 2022-05-31 NOTE — TELEPHONE ENCOUNTER
----- Message from [facility] [facility] sent at 5/26/2022 11:18 AM EDT -----  Forwarded this in-basket to Carolina Rosa  ----- Message -----  From: Eva Montejo MD  Sent: 5/26/2022  11:02 AM EDT  To: Cardiology [place] [facility]    Needs to follow with Dr Loredo after [place] office  Can follow with NP for 1st visit, in the next 10-14 days [FreeTextEntry1] : Ernesto is returning for a f/u\par  \par Feels intermittent - short lived episode of chest pain - sometimes with exertion.\par No syncope or palpitations\par No falls or blackouts\par Mild BILL\par No LE edema\par \par

## 2022-06-01 ENCOUNTER — HOSPITAL ENCOUNTER (EMERGENCY)
Facility: HOSPITAL | Age: 72
End: 2022-06-01
Attending: EMERGENCY MEDICINE
Payer: COMMERCIAL

## 2022-06-01 ENCOUNTER — APPOINTMENT (INPATIENT)
Dept: RADIOLOGY | Facility: HOSPITAL | Age: 72
DRG: 270 | End: 2022-06-01
Payer: COMMERCIAL

## 2022-06-01 ENCOUNTER — HOSPITAL ENCOUNTER (INPATIENT)
Facility: HOSPITAL | Age: 72
LOS: 1 days | Discharge: ANOTHER HOSPITAL | DRG: 270 | End: 2022-06-01
Attending: ANESTHESIOLOGY | Admitting: ANESTHESIOLOGY
Payer: COMMERCIAL

## 2022-06-01 ENCOUNTER — APPOINTMENT (INPATIENT)
Dept: NON INVASIVE DIAGNOSTICS | Facility: HOSPITAL | Age: 72
DRG: 270 | End: 2022-06-01
Payer: COMMERCIAL

## 2022-06-01 ENCOUNTER — APPOINTMENT (EMERGENCY)
Dept: RADIOLOGY | Facility: HOSPITAL | Age: 72
End: 2022-06-01
Payer: COMMERCIAL

## 2022-06-01 VITALS
RESPIRATION RATE: 22 BRPM | WEIGHT: 192.24 LBS | HEART RATE: 59 BPM | OXYGEN SATURATION: 100 % | TEMPERATURE: 97.9 F | DIASTOLIC BLOOD PRESSURE: 32 MMHG | BODY MASS INDEX: 34.05 KG/M2 | SYSTOLIC BLOOD PRESSURE: 91 MMHG

## 2022-06-01 VITALS
WEIGHT: 192 LBS | SYSTOLIC BLOOD PRESSURE: 65 MMHG | RESPIRATION RATE: 19 BRPM | BODY MASS INDEX: 34.02 KG/M2 | TEMPERATURE: 100 F | HEIGHT: 63 IN | DIASTOLIC BLOOD PRESSURE: 42 MMHG | HEART RATE: 90 BPM | OXYGEN SATURATION: 84 %

## 2022-06-01 DIAGNOSIS — I21.3 ACUTE ST ELEVATION MYOCARDIAL INFARCTION (STEMI), UNSPECIFIED ARTERY (HCC): Primary | ICD-10-CM

## 2022-06-01 DIAGNOSIS — R57.0 CARDIOGENIC SHOCK (HCC): ICD-10-CM

## 2022-06-01 DIAGNOSIS — E78.6 HYPO-BETA-LIPOPROTEINEMIA: Primary | ICD-10-CM

## 2022-06-01 DIAGNOSIS — I21.3 STEMI (ST ELEVATION MYOCARDIAL INFARCTION) (HCC): ICD-10-CM

## 2022-06-01 PROBLEM — I21.01 ST ELEVATION MYOCARDIAL INFARCTION INVOLVING LEFT MAIN CORONARY ARTERY (HCC): Status: ACTIVE | Noted: 2022-05-19

## 2022-06-01 PROBLEM — G92.8 TOXIC METABOLIC ENCEPHALOPATHY: Status: ACTIVE | Noted: 2022-06-01

## 2022-06-01 PROBLEM — I65.23 CAROTID STENOSIS, ASYMPTOMATIC, BILATERAL: Chronic | Status: ACTIVE | Noted: 2020-05-06

## 2022-06-01 PROBLEM — E87.20 METABOLIC ACIDOSIS: Status: ACTIVE | Noted: 2022-06-01

## 2022-06-01 PROBLEM — J44.9 COPD WITHOUT EXACERBATION (HCC): Chronic | Status: ACTIVE | Noted: 2022-05-19

## 2022-06-01 PROBLEM — E87.2 METABOLIC ACIDOSIS: Status: ACTIVE | Noted: 2022-06-01

## 2022-06-01 PROBLEM — J12.9 VIRAL PNEUMONIA: Status: RESOLVED | Noted: 2022-05-08 | Resolved: 2022-06-01

## 2022-06-01 PROBLEM — I77.1 STENOSIS OF LEFT SUBCLAVIAN ARTERY (HCC): Chronic | Status: ACTIVE | Noted: 2020-05-06

## 2022-06-01 PROBLEM — E11.9 TYPE 2 DIABETES MELLITUS, WITHOUT LONG-TERM CURRENT USE OF INSULIN (HCC): Chronic | Status: ACTIVE | Noted: 2019-10-29

## 2022-06-01 PROBLEM — R77.8 ELEVATED TROPONIN LEVEL NOT DUE TO ACUTE CORONARY SYNDROME: Status: RESOLVED | Noted: 2022-05-07 | Resolved: 2022-06-01

## 2022-06-01 PROBLEM — J96.01 ACUTE RESPIRATORY FAILURE WITH HYPOXIA (HCC): Status: ACTIVE | Noted: 2022-06-01

## 2022-06-01 PROBLEM — Z86.718 HISTORY OF DVT (DEEP VEIN THROMBOSIS): Status: RESOLVED | Noted: 2022-05-26 | Resolved: 2022-06-01

## 2022-06-01 PROBLEM — E66.9 OBESE: Chronic | Status: ACTIVE | Noted: 2022-05-21

## 2022-06-01 LAB
ALBUMIN SERPL BCP-MCNC: 2.7 G/DL (ref 3.5–5)
ALBUMIN SERPL BCP-MCNC: 3.9 G/DL (ref 3–5.2)
ALP SERPL-CCNC: 85 U/L (ref 43–122)
ALP SERPL-CCNC: 89 U/L (ref 46–116)
ALT SERPL W P-5'-P-CCNC: 28 U/L
ALT SERPL W P-5'-P-CCNC: 53 U/L (ref 12–78)
ANION GAP SERPL CALCULATED.3IONS-SCNC: 10 MMOL/L (ref 4–13)
ANION GAP SERPL CALCULATED.3IONS-SCNC: 10 MMOL/L (ref 5–14)
ANION GAP SERPL CALCULATED.3IONS-SCNC: 13 MMOL/L (ref 4–13)
AORTIC ROOT: 2.3 CM
APICAL FOUR CHAMBER EJECTION FRACTION: 22 %
APTT PPP: 41 SECONDS (ref 23–37)
ASCENDING AORTA: 1.7 CM
AST SERPL W P-5'-P-CCNC: 445 U/L (ref 5–45)
AST SERPL W P-5'-P-CCNC: 58 U/L (ref 14–36)
ATRIAL RATE: 0 BPM
ATRIAL RATE: 111 BPM
ATRIAL RATE: 82 BPM
ATRIAL RATE: 83 BPM
BASE EX.OXY STD BLDV CALC-SCNC: 67.8 % (ref 60–80)
BASE EX.OXY STD BLDV CALC-SCNC: 70.7 % (ref 60–80)
BASE EXCESS BLDA CALC-SCNC: -11 MMOL/L (ref -2–3)
BASE EXCESS BLDA CALC-SCNC: -4.5 MMOL/L
BASE EXCESS BLDA CALC-SCNC: -6.2 MMOL/L
BASE EXCESS BLDA CALC-SCNC: -8 MMOL/L
BASE EXCESS BLDV CALC-SCNC: -6.9 MMOL/L
BASE EXCESS BLDV CALC-SCNC: -7.9 MMOL/L
BASOPHILS # BLD AUTO: 0.06 THOUSANDS/ΜL (ref 0–0.1)
BASOPHILS NFR BLD AUTO: 1 % (ref 0–1)
BILIRUB SERPL-MCNC: 0.76 MG/DL (ref 0.2–1)
BILIRUB SERPL-MCNC: 0.94 MG/DL
BUN SERPL-MCNC: 6 MG/DL (ref 5–25)
BUN SERPL-MCNC: 7 MG/DL (ref 5–25)
BUN SERPL-MCNC: 9 MG/DL (ref 5–25)
CA-I BLD-SCNC: 1.19 MMOL/L (ref 1.12–1.32)
CALCIUM ALBUM COR SERPL-MCNC: 9.6 MG/DL (ref 8.3–10.1)
CALCIUM SERPL-MCNC: 8.5 MG/DL (ref 8.3–10.1)
CALCIUM SERPL-MCNC: 8.6 MG/DL (ref 8.3–10.1)
CALCIUM SERPL-MCNC: 9.2 MG/DL (ref 8.4–10.2)
CARDIAC TROPONIN I PNL SERPL HS: 33 NG/L
CARDIAC TROPONIN I PNL SERPL HS: ABNORMAL NG/L
CHLORIDE SERPL-SCNC: 109 MMOL/L (ref 100–108)
CHLORIDE SERPL-SCNC: 110 MMOL/L (ref 97–108)
CHLORIDE SERPL-SCNC: 111 MMOL/L (ref 100–108)
CO2 SERPL-SCNC: 20 MMOL/L (ref 22–30)
CO2 SERPL-SCNC: 21 MMOL/L (ref 21–32)
CO2 SERPL-SCNC: 22 MMOL/L (ref 21–32)
CREAT SERPL-MCNC: 0.8 MG/DL (ref 0.6–1.2)
CREAT SERPL-MCNC: 1.2 MG/DL (ref 0.6–1.3)
CREAT SERPL-MCNC: 1.67 MG/DL (ref 0.6–1.3)
EOSINOPHIL # BLD AUTO: 0.24 THOUSAND/ΜL (ref 0–0.61)
EOSINOPHIL NFR BLD AUTO: 3 % (ref 0–6)
ERYTHROCYTE [DISTWIDTH] IN BLOOD BY AUTOMATED COUNT: 14.3 % (ref 11.6–15.1)
ERYTHROCYTE [DISTWIDTH] IN BLOOD BY AUTOMATED COUNT: 14.5 % (ref 11.6–15.1)
FLUAV RNA RESP QL NAA+PROBE: NEGATIVE
FLUBV RNA RESP QL NAA+PROBE: NEGATIVE
FRACTIONAL SHORTENING: 13 (ref 28–44)
GFR SERPL CREATININE-BSD FRML MDRD: 30 ML/MIN/1.73SQ M
GFR SERPL CREATININE-BSD FRML MDRD: 44 ML/MIN/1.73SQ M
GFR SERPL CREATININE-BSD FRML MDRD: 73 ML/MIN/1.73SQ M
GLUCOSE SERPL-MCNC: 158 MG/DL (ref 70–99)
GLUCOSE SERPL-MCNC: 162 MG/DL (ref 65–140)
GLUCOSE SERPL-MCNC: 211 MG/DL (ref 65–140)
GLUCOSE SERPL-MCNC: 238 MG/DL (ref 65–140)
GLUCOSE SERPL-MCNC: 271 MG/DL (ref 65–140)
GLUCOSE SERPL-MCNC: 283 MG/DL (ref 65–140)
GLUCOSE SERPL-MCNC: 344 MG/DL (ref 65–140)
GLUCOSE SERPL-MCNC: 367 MG/DL (ref 65–140)
GLUCOSE SERPL-MCNC: 382 MG/DL (ref 65–140)
HCO3 BLDA-SCNC: 15 MMOL/L (ref 22–28)
HCO3 BLDA-SCNC: 17.5 MMOL/L (ref 22–28)
HCO3 BLDA-SCNC: 18.7 MMOL/L (ref 22–28)
HCO3 BLDA-SCNC: 20.8 MMOL/L (ref 22–28)
HCO3 BLDV-SCNC: 19.6 MMOL/L (ref 24–30)
HCO3 BLDV-SCNC: 20 MMOL/L (ref 24–30)
HCT VFR BLD AUTO: 36.4 % (ref 34.8–46.1)
HCT VFR BLD AUTO: 37.2 % (ref 34.8–46.1)
HCT VFR BLD CALC: 33 % (ref 34.8–46.1)
HGB BLD-MCNC: 11.2 G/DL (ref 11.5–15.4)
HGB BLD-MCNC: 11.3 G/DL (ref 11.5–15.4)
HGB BLDA-MCNC: 11.2 G/DL (ref 11.5–15.4)
HOROWITZ INDEX BLDA+IHG-RTO: 100 MM[HG]
IMM GRANULOCYTES # BLD AUTO: 0.02 THOUSAND/UL (ref 0–0.2)
IMM GRANULOCYTES NFR BLD AUTO: 0 % (ref 0–2)
INR PPP: 1.17 (ref 0.84–1.19)
INTERVENTRICULAR SEPTUM IN DIASTOLE (PARASTERNAL SHORT AXIS VIEW): 1 CM
INTERVENTRICULAR SEPTUM: 1 CM (ref 0.6–1.1)
LAAS-AP2: 21 CM2
LAAS-AP4: 17.1 CM2
LACTATE SERPL-SCNC: 6.3 MMOL/L (ref 0.5–2)
LACTATE SERPL-SCNC: 7.5 MMOL/L (ref 0.5–2)
LACTATE SERPL-SCNC: 9 MMOL/L (ref 0.5–2)
LEFT ATRIUM SIZE: 4.5 CM
LEFT ATRIUM VOLUME INDEX (MOD BIPLANE): 23.7
LEFT INTERNAL DIMENSION IN SYSTOLE: 3.4 CM (ref 2.1–4)
LEFT VENTRICLE DIASTOLIC VOLUME (MOD BIPLANE): 129 ML
LEFT VENTRICLE SYSTOLIC VOLUME (MOD BIPLANE): 96 ML
LEFT VENTRICULAR INTERNAL DIMENSION IN DIASTOLE: 3.9 CM (ref 3.5–6)
LEFT VENTRICULAR POSTERIOR WALL IN END DIASTOLE: 1.6 CM
LEFT VENTRICULAR STROKE VOLUME: 20 ML
LV EF: 26 %
LVSV (TEICH): 20 ML
LYMPHOCYTES # BLD AUTO: 3.18 THOUSANDS/ΜL (ref 0.6–4.47)
LYMPHOCYTES NFR BLD AUTO: 42 % (ref 14–44)
MAGNESIUM SERPL-MCNC: 1.5 MG/DL (ref 1.6–2.3)
MAGNESIUM SERPL-MCNC: 2.8 MG/DL (ref 1.6–2.6)
MCH RBC QN AUTO: 27.8 PG (ref 26.8–34.3)
MCH RBC QN AUTO: 28.4 PG (ref 26.8–34.3)
MCHC RBC AUTO-ENTMCNC: 30.4 G/DL (ref 31.4–37.4)
MCHC RBC AUTO-ENTMCNC: 30.8 G/DL (ref 31.4–37.4)
MCV RBC AUTO: 92 FL (ref 82–98)
MCV RBC AUTO: 92 FL (ref 82–98)
MITRAL REGURGITATION PEAK VELOCITY: 3.95 M/S
MITRAL VALVE MEAN INFLOW VELOCITY: 3.04 M/S
MITRAL VALVE REGURGITANT PEAK GRADIENT: 62 MMHG
MONOCYTES # BLD AUTO: 0.81 THOUSAND/ΜL (ref 0.17–1.22)
MONOCYTES NFR BLD AUTO: 11 % (ref 4–12)
MV E'TISSUE VEL-SEP: 8 CM/S
NEUTROPHILS # BLD AUTO: 3.22 THOUSANDS/ΜL (ref 1.85–7.62)
NEUTS SEG NFR BLD AUTO: 43 % (ref 43–75)
NRBC BLD AUTO-RTO: 0 /100 WBCS
NT-PROBNP SERPL-MCNC: 3730 PG/ML (ref 0–299)
O2 CT BLDA-SCNC: 15.4 ML/DL (ref 16–23)
O2 CT BLDA-SCNC: 16.9 ML/DL (ref 16–23)
O2 CT BLDA-SCNC: 17.5 ML/DL (ref 16–23)
O2 CT BLDV-SCNC: 11.5 ML/DL
O2 CT BLDV-SCNC: 11.9 ML/DL
OXYHGB MFR BLDA: 96.4 % (ref 94–97)
OXYHGB MFR BLDA: 97.9 % (ref 94–97)
OXYHGB MFR BLDA: 98.4 % (ref 94–97)
P AXIS: 39 DEGREES
P AXIS: 40 DEGREES
P AXIS: 74 DEGREES
PCO2 BLD: 16 MMOL/L (ref 21–32)
PCO2 BLD: 32.1 MM HG (ref 36–44)
PCO2 BLDA: 34.8 MM HG (ref 36–44)
PCO2 BLDA: 35.7 MM HG (ref 36–44)
PCO2 BLDA: 38.9 MM HG (ref 36–44)
PCO2 BLDV: 45.7 MM HG (ref 42–50)
PCO2 BLDV: 48.3 MM HG (ref 42–50)
PEEP RESPIRATORY: 12 CM[H2O]
PH BLD: 7.28 [PH] (ref 7.35–7.45)
PH BLDA: 7.31 [PH] (ref 7.35–7.45)
PH BLDA: 7.35 [PH] (ref 7.35–7.45)
PH BLDA: 7.35 [PH] (ref 7.35–7.45)
PH BLDV: 7.23 [PH] (ref 7.3–7.4)
PH BLDV: 7.26 [PH] (ref 7.3–7.4)
PHOSPHATE SERPL-MCNC: 4.3 MG/DL (ref 2.3–4.1)
PLATELET # BLD AUTO: 208 THOUSANDS/UL (ref 149–390)
PLATELET # BLD AUTO: 274 THOUSANDS/UL (ref 149–390)
PMV BLD AUTO: 10.6 FL (ref 8.9–12.7)
PMV BLD AUTO: 10.6 FL (ref 8.9–12.7)
PO2 BLD: 222 MM HG (ref 75–129)
PO2 BLDA: 137.5 MM HG (ref 75–129)
PO2 BLDA: 180.3 MM HG (ref 75–129)
PO2 BLDA: 95.7 MM HG (ref 75–129)
PO2 BLDV: 42.8 MM HG (ref 35–45)
PO2 BLDV: 44.1 MM HG (ref 35–45)
POTASSIUM BLD-SCNC: 3.1 MMOL/L (ref 3.5–5.3)
POTASSIUM SERPL-SCNC: 3 MMOL/L (ref 3.5–5.3)
POTASSIUM SERPL-SCNC: 3.3 MMOL/L (ref 3.5–5.3)
POTASSIUM SERPL-SCNC: 3.9 MMOL/L (ref 3.6–5)
PR INTERVAL: 124 MS
PR INTERVAL: 132 MS
PR INTERVAL: 200 MS
PROT SERPL-MCNC: 6.4 G/DL (ref 6.4–8.2)
PROT SERPL-MCNC: 7.3 G/DL (ref 5.9–8.4)
PROTHROMBIN TIME: 14.4 SECONDS (ref 11.6–14.5)
QRS AXIS: -38 DEGREES
QRS AXIS: 5 DEGREES
QRS AXIS: 89 DEGREES
QRS AXIS: 99 DEGREES
QRSD INTERVAL: 128 MS
QRSD INTERVAL: 84 MS
QRSD INTERVAL: 92 MS
QRSD INTERVAL: 94 MS
QT INTERVAL: 372 MS
QT INTERVAL: 378 MS
QT INTERVAL: 418 MS
QT INTERVAL: 424 MS
QTC INTERVAL: 444 MS
QTC INTERVAL: 495 MS
QTC INTERVAL: 505 MS
QTC INTERVAL: 622 MS
RBC # BLD AUTO: 3.95 MILLION/UL (ref 3.81–5.12)
RBC # BLD AUTO: 4.06 MILLION/UL (ref 3.81–5.12)
RIGHT ATRIAL 2D VOLUME: 37 ML
RIGHT ATRIUM AREA SYSTOLE A4C: 14.7 CM2
RIGHT VENTRICLE ID DIMENSION: 3.4 CM
RSV RNA RESP QL NAA+PROBE: NEGATIVE
SAO2 % BLD FROM PO2: 100 % (ref 60–85)
SARS-COV-2 RNA RESP QL NAA+PROBE: NEGATIVE
SL CV DOP CALC MV VTI RETROGRADE: 81.1 CM
SL CV LEFT ATRIUM LENGTH A2C: 5.1 CM
SL CV LV EF: 25
SL CV MV MEAN GRADIENT RETROGRADE: 41 MMHG
SL CV PED ECHO LEFT VENTRICLE DIASTOLIC VOLUME (MOD BIPLANE) 2D: 66 ML
SL CV PED ECHO LEFT VENTRICLE SYSTOLIC VOLUME (MOD BIPLANE) 2D: 47 ML
SODIUM BLD-SCNC: 139 MMOL/L (ref 136–145)
SODIUM SERPL-SCNC: 140 MMOL/L (ref 137–147)
SODIUM SERPL-SCNC: 143 MMOL/L (ref 136–145)
SODIUM SERPL-SCNC: 143 MMOL/L (ref 136–145)
SPECIMEN SOURCE: ABNORMAL
T WAVE AXIS: -59 DEGREES
T WAVE AXIS: -78 DEGREES
T WAVE AXIS: 162 DEGREES
T WAVE AXIS: 2 DEGREES
TR MAX PG: 55 MMHG
TR PEAK VELOCITY: 3.7 M/S
TRICUSPID VALVE PEAK REGURGITATION VELOCITY: 3.72 M/S
VENT AC: 16
VENT AC: 20
VENT AC: 20
VENT- AC: AC
VENTRICULAR RATE: 111 BPM
VENTRICULAR RATE: 133 BPM
VENTRICULAR RATE: 82 BPM
VENTRICULAR RATE: 83 BPM
VT SETTING VENT: 450 ML
WBC # BLD AUTO: 22.7 THOUSAND/UL (ref 4.31–10.16)
WBC # BLD AUTO: 7.53 THOUSAND/UL (ref 4.31–10.16)

## 2022-06-01 PROCEDURE — 4A133J1 MONITORING OF ARTERIAL PULSE, PERIPHERAL, PERCUTANEOUS APPROACH: ICD-10-PCS | Performed by: ANESTHESIOLOGY

## 2022-06-01 PROCEDURE — 94640 AIRWAY INHALATION TREATMENT: CPT

## 2022-06-01 PROCEDURE — NC001 PR NO CHARGE: Performed by: THORACIC SURGERY (CARDIOTHORACIC VASCULAR SURGERY)

## 2022-06-01 PROCEDURE — 80053 COMPREHEN METABOLIC PANEL: CPT | Performed by: NURSE PRACTITIONER

## 2022-06-01 PROCEDURE — 82947 ASSAY GLUCOSE BLOOD QUANT: CPT

## 2022-06-01 PROCEDURE — 92920 PRQ TRLUML C ANGIOP 1ART&/BR: CPT | Performed by: INTERNAL MEDICINE

## 2022-06-01 PROCEDURE — NC001 PR NO CHARGE: Performed by: INTERNAL MEDICINE

## 2022-06-01 PROCEDURE — 92973 PRQ TRLUML C MCHN ASP THRMBC: CPT | Performed by: INTERNAL MEDICINE

## 2022-06-01 PROCEDURE — 36620 INSERTION CATHETER ARTERY: CPT | Performed by: ANESTHESIOLOGY

## 2022-06-01 PROCEDURE — 80053 COMPREHEN METABOLIC PANEL: CPT | Performed by: EMERGENCY MEDICINE

## 2022-06-01 PROCEDURE — 36415 COLL VENOUS BLD VENIPUNCTURE: CPT | Performed by: EMERGENCY MEDICINE

## 2022-06-01 PROCEDURE — 83735 ASSAY OF MAGNESIUM: CPT | Performed by: NURSE PRACTITIONER

## 2022-06-01 PROCEDURE — 93454 CORONARY ARTERY ANGIO S&I: CPT | Performed by: INTERNAL MEDICINE

## 2022-06-01 PROCEDURE — NC001 PR NO CHARGE: Performed by: PHYSICIAN ASSISTANT

## 2022-06-01 PROCEDURE — 99291 CRITICAL CARE FIRST HOUR: CPT | Performed by: EMERGENCY MEDICINE

## 2022-06-01 PROCEDURE — C1725 CATH, TRANSLUMIN NON-LASER: HCPCS | Performed by: INTERNAL MEDICINE

## 2022-06-01 PROCEDURE — 85610 PROTHROMBIN TIME: CPT | Performed by: NURSE PRACTITIONER

## 2022-06-01 PROCEDURE — 85730 THROMBOPLASTIN TIME PARTIAL: CPT | Performed by: NURSE PRACTITIONER

## 2022-06-01 PROCEDURE — 33967 INSERT I-AORT PERCUT DEVICE: CPT | Performed by: INTERNAL MEDICINE

## 2022-06-01 PROCEDURE — C1769 GUIDE WIRE: HCPCS | Performed by: INTERNAL MEDICINE

## 2022-06-01 PROCEDURE — 82810 BLOOD GASES O2 SAT ONLY: CPT | Performed by: INTERNAL MEDICINE

## 2022-06-01 PROCEDURE — C1887 CATHETER, GUIDING: HCPCS | Performed by: INTERNAL MEDICINE

## 2022-06-01 PROCEDURE — 99152 MOD SED SAME PHYS/QHP 5/>YRS: CPT | Performed by: INTERNAL MEDICINE

## 2022-06-01 PROCEDURE — 80048 BASIC METABOLIC PNL TOTAL CA: CPT | Performed by: NURSE PRACTITIONER

## 2022-06-01 PROCEDURE — 4A023N6 MEASUREMENT OF CARDIAC SAMPLING AND PRESSURE, RIGHT HEART, PERCUTANEOUS APPROACH: ICD-10-PCS | Performed by: INTERNAL MEDICINE

## 2022-06-01 PROCEDURE — 82805 BLOOD GASES W/O2 SATURATION: CPT | Performed by: NURSE PRACTITIONER

## 2022-06-01 PROCEDURE — 96375 TX/PRO/DX INJ NEW DRUG ADDON: CPT

## 2022-06-01 PROCEDURE — 71045 X-RAY EXAM CHEST 1 VIEW: CPT

## 2022-06-01 PROCEDURE — 93010 ELECTROCARDIOGRAM REPORT: CPT | Performed by: INTERNAL MEDICINE

## 2022-06-01 PROCEDURE — 85025 COMPLETE CBC W/AUTO DIFF WBC: CPT | Performed by: EMERGENCY MEDICINE

## 2022-06-01 PROCEDURE — 93005 ELECTROCARDIOGRAM TRACING: CPT

## 2022-06-01 PROCEDURE — NC001 PR NO CHARGE: Performed by: ANESTHESIOLOGY

## 2022-06-01 PROCEDURE — 03HY32Z INSERTION OF MONITORING DEVICE INTO UPPER ARTERY, PERCUTANEOUS APPROACH: ICD-10-PCS | Performed by: ANESTHESIOLOGY

## 2022-06-01 PROCEDURE — 82805 BLOOD GASES W/O2 SATURATION: CPT | Performed by: PHYSICIAN ASSISTANT

## 2022-06-01 PROCEDURE — 0241U HB NFCT DS VIR RESP RNA 4 TRGT: CPT | Performed by: ANESTHESIOLOGY

## 2022-06-01 PROCEDURE — 5A02210 ASSISTANCE WITH CARDIAC OUTPUT USING BALLOON PUMP, CONTINUOUS: ICD-10-PCS | Performed by: INTERNAL MEDICINE

## 2022-06-01 PROCEDURE — 82948 REAGENT STRIP/BLOOD GLUCOSE: CPT

## 2022-06-01 PROCEDURE — 84295 ASSAY OF SERUM SODIUM: CPT

## 2022-06-01 PROCEDURE — 94002 VENT MGMT INPAT INIT DAY: CPT

## 2022-06-01 PROCEDURE — 99285 EMERGENCY DEPT VISIT HI MDM: CPT

## 2022-06-01 PROCEDURE — B2111ZZ FLUOROSCOPY OF MULTIPLE CORONARY ARTERIES USING LOW OSMOLAR CONTRAST: ICD-10-PCS | Performed by: INTERNAL MEDICINE

## 2022-06-01 PROCEDURE — 84132 ASSAY OF SERUM POTASSIUM: CPT

## 2022-06-01 PROCEDURE — 83605 ASSAY OF LACTIC ACID: CPT | Performed by: ANESTHESIOLOGY

## 2022-06-01 PROCEDURE — 96374 THER/PROPH/DIAG INJ IV PUSH: CPT

## 2022-06-01 PROCEDURE — 02703ZZ DILATION OF CORONARY ARTERY, ONE ARTERY, PERCUTANEOUS APPROACH: ICD-10-PCS | Performed by: INTERNAL MEDICINE

## 2022-06-01 PROCEDURE — 83880 ASSAY OF NATRIURETIC PEPTIDE: CPT | Performed by: EMERGENCY MEDICINE

## 2022-06-01 PROCEDURE — 82330 ASSAY OF CALCIUM: CPT

## 2022-06-01 PROCEDURE — 99291 CRITICAL CARE FIRST HOUR: CPT | Performed by: ANESTHESIOLOGY

## 2022-06-01 PROCEDURE — 94760 N-INVAS EAR/PLS OXIMETRY 1: CPT

## 2022-06-01 PROCEDURE — C1757 CATH, THROMBECTOMY/EMBOLECT: HCPCS | Performed by: INTERNAL MEDICINE

## 2022-06-01 PROCEDURE — 85027 COMPLETE CBC AUTOMATED: CPT | Performed by: NURSE PRACTITIONER

## 2022-06-01 PROCEDURE — C1894 INTRO/SHEATH, NON-LASER: HCPCS | Performed by: INTERNAL MEDICINE

## 2022-06-01 PROCEDURE — 83605 ASSAY OF LACTIC ACID: CPT | Performed by: NURSE PRACTITIONER

## 2022-06-01 PROCEDURE — 85014 HEMATOCRIT: CPT

## 2022-06-01 PROCEDURE — 84484 ASSAY OF TROPONIN QUANT: CPT | Performed by: EMERGENCY MEDICINE

## 2022-06-01 PROCEDURE — 93503 INSERT/PLACE HEART CATHETER: CPT | Performed by: INTERNAL MEDICINE

## 2022-06-01 PROCEDURE — 84100 ASSAY OF PHOSPHORUS: CPT | Performed by: NURSE PRACTITIONER

## 2022-06-01 PROCEDURE — 93306 TTE W/DOPPLER COMPLETE: CPT | Performed by: INTERNAL MEDICINE

## 2022-06-01 PROCEDURE — 4A133B1 MONITORING OF ARTERIAL PRESSURE, PERIPHERAL, PERCUTANEOUS APPROACH: ICD-10-PCS | Performed by: ANESTHESIOLOGY

## 2022-06-01 PROCEDURE — 83735 ASSAY OF MAGNESIUM: CPT | Performed by: EMERGENCY MEDICINE

## 2022-06-01 PROCEDURE — 99292 CRITICAL CARE ADDL 30 MIN: CPT | Performed by: NURSE PRACTITIONER

## 2022-06-01 PROCEDURE — 5A1935Z RESPIRATORY VENTILATION, LESS THAN 24 CONSECUTIVE HOURS: ICD-10-PCS | Performed by: ANESTHESIOLOGY

## 2022-06-01 PROCEDURE — 99153 MOD SED SAME PHYS/QHP EA: CPT | Performed by: INTERNAL MEDICINE

## 2022-06-01 PROCEDURE — 02C13ZZ EXTIRPATION OF MATTER FROM CORONARY ARTERY, TWO ARTERIES, PERCUTANEOUS APPROACH: ICD-10-PCS | Performed by: INTERNAL MEDICINE

## 2022-06-01 PROCEDURE — 93451 RIGHT HEART CATH: CPT | Performed by: INTERNAL MEDICINE

## 2022-06-01 PROCEDURE — C8929 TTE W OR WO FOL WCON,DOPPLER: HCPCS

## 2022-06-01 PROCEDURE — 82803 BLOOD GASES ANY COMBINATION: CPT

## 2022-06-01 PROCEDURE — 84484 ASSAY OF TROPONIN QUANT: CPT | Performed by: INTERNAL MEDICINE

## 2022-06-01 RX ORDER — NITROGLYCERIN 0.4 MG/1
0.4 TABLET SUBLINGUAL
Status: DISCONTINUED | OUTPATIENT
Start: 2022-06-01 | End: 2022-06-01 | Stop reason: HOSPADM

## 2022-06-01 RX ORDER — POTASSIUM CHLORIDE 29.8 MG/ML
40 INJECTION INTRAVENOUS ONCE
Status: DISCONTINUED | OUTPATIENT
Start: 2022-06-01 | End: 2022-06-01 | Stop reason: HOSPADM

## 2022-06-01 RX ORDER — MAGNESIUM SULFATE HEPTAHYDRATE 40 MG/ML
2 INJECTION, SOLUTION INTRAVENOUS ONCE
Status: COMPLETED | OUTPATIENT
Start: 2022-06-01 | End: 2022-06-01

## 2022-06-01 RX ORDER — FUROSEMIDE 10 MG/ML
80 INJECTION INTRAMUSCULAR; INTRAVENOUS ONCE
Status: DISCONTINUED | OUTPATIENT
Start: 2022-06-01 | End: 2022-06-01

## 2022-06-01 RX ORDER — HEPARIN SODIUM 1000 [USP'U]/ML
2000 INJECTION, SOLUTION INTRAVENOUS; SUBCUTANEOUS
Status: DISCONTINUED | OUTPATIENT
Start: 2022-06-01 | End: 2022-06-01 | Stop reason: HOSPADM

## 2022-06-01 RX ORDER — MIDAZOLAM HYDROCHLORIDE 2 MG/2ML
4 INJECTION, SOLUTION INTRAMUSCULAR; INTRAVENOUS ONCE
Status: COMPLETED | OUTPATIENT
Start: 2022-06-01 | End: 2022-06-01

## 2022-06-01 RX ORDER — FENTANYL CITRATE 50 UG/ML
50 INJECTION, SOLUTION INTRAMUSCULAR; INTRAVENOUS
Status: DISCONTINUED | OUTPATIENT
Start: 2022-06-01 | End: 2022-06-01 | Stop reason: HOSPADM

## 2022-06-01 RX ORDER — AMIODARONE HYDROCHLORIDE 50 MG/ML
INJECTION, SOLUTION INTRAVENOUS AS NEEDED
Status: DISCONTINUED | OUTPATIENT
Start: 2022-06-01 | End: 2022-06-01 | Stop reason: HOSPADM

## 2022-06-01 RX ORDER — HEPARIN SODIUM 1000 [USP'U]/ML
4000 INJECTION, SOLUTION INTRAVENOUS; SUBCUTANEOUS
Status: DISCONTINUED | OUTPATIENT
Start: 2022-06-01 | End: 2022-06-01

## 2022-06-01 RX ORDER — HEPARIN SODIUM 1000 [USP'U]/ML
INJECTION, SOLUTION INTRAVENOUS; SUBCUTANEOUS AS NEEDED
Status: DISCONTINUED | OUTPATIENT
Start: 2022-06-01 | End: 2022-06-01 | Stop reason: HOSPADM

## 2022-06-01 RX ORDER — MAGNESIUM SULFATE HEPTAHYDRATE 500 MG/ML
INJECTION, SOLUTION INTRAMUSCULAR; INTRAVENOUS AS NEEDED
Status: DISCONTINUED | OUTPATIENT
Start: 2022-06-01 | End: 2022-06-01 | Stop reason: HOSPADM

## 2022-06-01 RX ORDER — HEPARIN SODIUM 10000 [USP'U]/100ML
3-20 INJECTION, SOLUTION INTRAVENOUS
Status: DISCONTINUED | OUTPATIENT
Start: 2022-06-01 | End: 2022-06-01

## 2022-06-01 RX ORDER — NITROGLYCERIN 0.4 MG/1
0.4 TABLET SUBLINGUAL
Status: DISCONTINUED | OUTPATIENT
Start: 2022-06-01 | End: 2022-06-01

## 2022-06-01 RX ORDER — ACETAMINOPHEN 325 MG/1
650 TABLET ORAL EVERY 4 HOURS PRN
Status: DISCONTINUED | OUTPATIENT
Start: 2022-06-01 | End: 2022-06-01

## 2022-06-01 RX ORDER — CHLORHEXIDINE GLUCONATE 0.12 MG/ML
15 RINSE ORAL EVERY 12 HOURS SCHEDULED
Status: DISCONTINUED | OUTPATIENT
Start: 2022-06-01 | End: 2022-06-01 | Stop reason: HOSPADM

## 2022-06-01 RX ORDER — POTASSIUM CHLORIDE 20MEQ/15ML
40 LIQUID (ML) ORAL ONCE
Status: COMPLETED | OUTPATIENT
Start: 2022-06-01 | End: 2022-06-01

## 2022-06-01 RX ORDER — HEPARIN SODIUM 1000 [USP'U]/ML
4000 INJECTION, SOLUTION INTRAVENOUS; SUBCUTANEOUS
Status: DISCONTINUED | OUTPATIENT
Start: 2022-06-01 | End: 2022-06-01 | Stop reason: HOSPADM

## 2022-06-01 RX ORDER — FENTANYL CITRATE 50 UG/ML
50 INJECTION, SOLUTION INTRAMUSCULAR; INTRAVENOUS ONCE
Status: COMPLETED | OUTPATIENT
Start: 2022-06-01 | End: 2022-06-01

## 2022-06-01 RX ORDER — HEPARIN SODIUM 5000 [USP'U]/ML
5000 INJECTION, SOLUTION INTRAVENOUS; SUBCUTANEOUS EVERY 8 HOURS SCHEDULED
Status: DISCONTINUED | OUTPATIENT
Start: 2022-06-01 | End: 2022-06-01

## 2022-06-01 RX ORDER — ATORVASTATIN CALCIUM 40 MG/1
40 TABLET, FILM COATED ORAL
Status: DISCONTINUED | OUTPATIENT
Start: 2022-06-01 | End: 2022-06-01 | Stop reason: HOSPADM

## 2022-06-01 RX ORDER — ONDANSETRON 2 MG/ML
4 INJECTION INTRAMUSCULAR; INTRAVENOUS ONCE
Status: COMPLETED | OUTPATIENT
Start: 2022-06-01 | End: 2022-06-01

## 2022-06-01 RX ORDER — HEPARIN SODIUM 10000 [USP'U]/100ML
3-20 INJECTION, SOLUTION INTRAVENOUS
Status: DISCONTINUED | OUTPATIENT
Start: 2022-06-01 | End: 2022-06-01 | Stop reason: HOSPADM

## 2022-06-01 RX ORDER — HEPARIN SODIUM 1000 [USP'U]/ML
2000 INJECTION, SOLUTION INTRAVENOUS; SUBCUTANEOUS
Status: DISCONTINUED | OUTPATIENT
Start: 2022-06-01 | End: 2022-06-01

## 2022-06-01 RX ORDER — ASPIRIN 81 MG/1
81 TABLET, CHEWABLE ORAL DAILY
Status: DISCONTINUED | OUTPATIENT
Start: 2022-06-02 | End: 2022-06-01

## 2022-06-01 RX ORDER — HEPARIN SODIUM 1000 [USP'U]/ML
4000 INJECTION, SOLUTION INTRAVENOUS; SUBCUTANEOUS ONCE
Status: COMPLETED | OUTPATIENT
Start: 2022-06-01 | End: 2022-06-01

## 2022-06-01 RX ORDER — FENTANYL CITRATE-0.9 % NACL/PF 10 MCG/ML
50 PLASTIC BAG, INJECTION (ML) INTRAVENOUS CONTINUOUS
Status: DISCONTINUED | OUTPATIENT
Start: 2022-06-01 | End: 2022-06-01 | Stop reason: HOSPADM

## 2022-06-01 RX ORDER — HEPARIN SODIUM 1000 [USP'U]/ML
4000 INJECTION, SOLUTION INTRAVENOUS; SUBCUTANEOUS ONCE
Status: DISCONTINUED | OUTPATIENT
Start: 2022-06-01 | End: 2022-06-01

## 2022-06-01 RX ORDER — FUROSEMIDE 10 MG/ML
80 INJECTION INTRAMUSCULAR; INTRAVENOUS ONCE
Status: COMPLETED | OUTPATIENT
Start: 2022-06-01 | End: 2022-06-01

## 2022-06-01 RX ORDER — ONDANSETRON 2 MG/ML
4 INJECTION INTRAMUSCULAR; INTRAVENOUS EVERY 6 HOURS PRN
Status: DISCONTINUED | OUTPATIENT
Start: 2022-06-01 | End: 2022-06-01 | Stop reason: HOSPADM

## 2022-06-01 RX ORDER — MILRINONE LACTATE 0.2 MG/ML
0.25 INJECTION, SOLUTION INTRAVENOUS CONTINUOUS
Status: DISCONTINUED | OUTPATIENT
Start: 2022-06-01 | End: 2022-06-01 | Stop reason: HOSPADM

## 2022-06-01 RX ORDER — ACETAMINOPHEN 160 MG/5ML
650 SUSPENSION, ORAL (FINAL DOSE FORM) ORAL EVERY 6 HOURS PRN
Status: DISCONTINUED | OUTPATIENT
Start: 2022-06-01 | End: 2022-06-01 | Stop reason: HOSPADM

## 2022-06-01 RX ORDER — PHENYLEPHRINE HYDROCHLORIDE 10 MG/ML
INJECTION INTRAVENOUS AS NEEDED
Status: DISCONTINUED | OUTPATIENT
Start: 2022-06-01 | End: 2022-06-01 | Stop reason: HOSPADM

## 2022-06-01 RX ORDER — ASPIRIN 81 MG/1
324 TABLET, CHEWABLE ORAL ONCE
Status: COMPLETED | OUTPATIENT
Start: 2022-06-01 | End: 2022-06-01

## 2022-06-01 RX ORDER — EPINEPHRINE 0.1 MG/ML
SYRINGE (ML) INJECTION AS NEEDED
Status: DISCONTINUED | OUTPATIENT
Start: 2022-06-01 | End: 2022-06-01 | Stop reason: HOSPADM

## 2022-06-01 RX ORDER — LEVALBUTEROL INHALATION SOLUTION 0.63 MG/3ML
0.63 SOLUTION RESPIRATORY (INHALATION)
Status: DISCONTINUED | OUTPATIENT
Start: 2022-06-01 | End: 2022-06-01 | Stop reason: HOSPADM

## 2022-06-01 RX ORDER — POTASSIUM CHLORIDE 29.8 MG/ML
40 INJECTION INTRAVENOUS ONCE
Status: COMPLETED | OUTPATIENT
Start: 2022-06-01 | End: 2022-06-01

## 2022-06-01 RX ORDER — ASPIRIN 81 MG/1
81 TABLET, CHEWABLE ORAL DAILY
Status: DISCONTINUED | OUTPATIENT
Start: 2022-06-02 | End: 2022-06-01 | Stop reason: HOSPADM

## 2022-06-01 RX ORDER — MIDAZOLAM HYDROCHLORIDE 2 MG/2ML
INJECTION, SOLUTION INTRAMUSCULAR; INTRAVENOUS AS NEEDED
Status: DISCONTINUED | OUTPATIENT
Start: 2022-06-01 | End: 2022-06-01 | Stop reason: HOSPADM

## 2022-06-01 RX ADMIN — SODIUM CHLORIDE 12 UNITS/HR: 9 INJECTION, SOLUTION INTRAVENOUS at 20:21

## 2022-06-01 RX ADMIN — IPRATROPIUM BROMIDE 0.5 MG: 0.5 SOLUTION RESPIRATORY (INHALATION) at 13:03

## 2022-06-01 RX ADMIN — MAGNESIUM SULFATE HEPTAHYDRATE 2 G: 40 INJECTION, SOLUTION INTRAVENOUS at 11:50

## 2022-06-01 RX ADMIN — SODIUM BICARBONATE 50 MEQ: 84 INJECTION, SOLUTION INTRAVENOUS at 12:57

## 2022-06-01 RX ADMIN — MILRINONE LACTATE IN DEXTROSE 0.25 MCG/KG/MIN: 200 INJECTION, SOLUTION INTRAVENOUS at 13:56

## 2022-06-01 RX ADMIN — NOREPINEPHRINE BITARTRATE 5 MCG/MIN: 1 INJECTION, SOLUTION, CONCENTRATE INTRAVENOUS at 12:53

## 2022-06-01 RX ADMIN — ASPIRIN 81 MG CHEWABLE TABLET 324 MG: 81 TABLET CHEWABLE at 08:33

## 2022-06-01 RX ADMIN — SODIUM CHLORIDE 5 UNITS/HR: 9 INJECTION, SOLUTION INTRAVENOUS at 14:00

## 2022-06-01 RX ADMIN — SODIUM BICARBONATE 125 ML/HR: 84 INJECTION, SOLUTION INTRAVENOUS at 14:08

## 2022-06-01 RX ADMIN — HEPARIN SODIUM 4000 UNITS: 1000 INJECTION INTRAVENOUS; SUBCUTANEOUS at 09:02

## 2022-06-01 RX ADMIN — TICAGRELOR 180 MG: 90 TABLET ORAL at 08:55

## 2022-06-01 RX ADMIN — POTASSIUM CHLORIDE 40 MEQ: 29.8 INJECTION, SOLUTION INTRAVENOUS at 19:34

## 2022-06-01 RX ADMIN — AMIODARONE HYDROCHLORIDE 1 MG/MIN: 50 INJECTION, SOLUTION INTRAVENOUS at 12:55

## 2022-06-01 RX ADMIN — POTASSIUM CHLORIDE 40 MEQ: 20 SOLUTION ORAL at 14:28

## 2022-06-01 RX ADMIN — PERFLUTREN 0.8 ML/MIN: 6.52 INJECTION, SUSPENSION INTRAVENOUS at 13:40

## 2022-06-01 RX ADMIN — MIDAZOLAM 4 MG: 1 INJECTION INTRAMUSCULAR; INTRAVENOUS at 20:41

## 2022-06-01 RX ADMIN — POTASSIUM CHLORIDE 40 MEQ: 29.8 INJECTION, SOLUTION INTRAVENOUS at 11:51

## 2022-06-01 RX ADMIN — LEVALBUTEROL HYDROCHLORIDE 0.63 MG: 0.63 SOLUTION RESPIRATORY (INHALATION) at 13:03

## 2022-06-01 RX ADMIN — IPRATROPIUM BROMIDE 0.5 MG: 0.5 SOLUTION RESPIRATORY (INHALATION) at 17:57

## 2022-06-01 RX ADMIN — Medication 50 MEQ: at 12:57

## 2022-06-01 RX ADMIN — FENTANYL CITRATE 50 MCG: 50 INJECTION, SOLUTION INTRAMUSCULAR; INTRAVENOUS at 08:32

## 2022-06-01 RX ADMIN — FENTANYL CITRATE 50 MCG/HR: 50 INJECTION, SOLUTION INTRAMUSCULAR; INTRAVENOUS at 14:15

## 2022-06-01 RX ADMIN — HEPARIN SODIUM 4000 UNITS: 1000 INJECTION INTRAVENOUS; SUBCUTANEOUS at 15:16

## 2022-06-01 RX ADMIN — Medication 20 MG: at 14:28

## 2022-06-01 RX ADMIN — LEVALBUTEROL HYDROCHLORIDE 0.63 MG: 0.63 SOLUTION RESPIRATORY (INHALATION) at 17:57

## 2022-06-01 RX ADMIN — FENTANYL CITRATE 50 MCG: 50 INJECTION INTRAMUSCULAR; INTRAVENOUS at 16:28

## 2022-06-01 RX ADMIN — HEPARIN SODIUM 11.8 UNITS/KG/HR: 10000 INJECTION, SOLUTION INTRAVENOUS at 15:07

## 2022-06-01 RX ADMIN — FUROSEMIDE 80 MG: 10 INJECTION, SOLUTION INTRAMUSCULAR; INTRAVENOUS at 13:58

## 2022-06-01 RX ADMIN — ONDANSETRON 4 MG: 2 INJECTION INTRAMUSCULAR; INTRAVENOUS at 08:29

## 2022-06-01 NOTE — CONSULTS
Cristiano Gray was previously seen in consultation for evaluation of coronary artery bypass grafting  In light of her prohibitively high surgical risk, she underwent percutaneous intervention of her coronary disease  Please see note by Dr Zuleyka Saini, for details of this consultation      Demetrio [facility] PA-C  06/01/22  11:18 AM

## 2022-06-01 NOTE — H&P
Cardiology   Mohan Duron 67 y o  female MRN: [id number]  Unit/Bed#: [facility] CATH LAB ROOM Encounter: [id number]      Chandan Pal MD      Assessment/Plan   Acute STEMI with cardiogenic shock  Non-STEMI - 05/20/2022  Status post PCI PCI and drug-eluting stents to 100%   60% proximal LAD stenosis and ostial left main PCI and drug-eluting stent on 05/04/20/2022  History of DVT  Diabetes type 2 diabetes type 2  Chronic combined systolic diastolic heart failure with ejection fraction of 40%  Peripheral artery disease  GERD    History of Present Illness     HPI:  Mohan Duron is a 67 y o  female who had a non ST-elevation MI on 05/20/2022  and underwent cardiac catheterization  Patient had 65% left main occlusion and a 99% proximal LAD  Patient was transferred from [address] [place] [address]  Patient was seen CT surgery and was a surgical  turned down  Patient underwent PCI and drug-eluting stents to the left main and proximal LAD  Patient was discharged home on aspirin, Plavix beta-blocker and statin  However there is some question that the patient was NON COMPLAINT with her ASA and Plavix  This morning she was walking into [facility] for a vascular study for lower leg claudication  On the way into the hospital she developed sudden, midsternal chest pain with radiation to the right side of her chest   She developed shortness of breath, diaphoresis, weakness and nausea  Patient was taken to the emergency room  Her EKG in the emergency room revealed sinus rhythm with infrequent PVCs with T wave inversions in the anterolateral leads and ST elevation in lead I and aVL  MI alert was initiated she was transferred to [facility] cardiac catheter[facility] lab  On arrival to the lab she was diaphoretic, pale and still complaining of anterior chest pain  She was on Alex-Synephrine drip and was we had difficulty obtaining accurate blood pressure on her    The venous sheath was inserted as well as an arterial sheath  She remains extremely hypotensive  Cardiac catheterization revealed that the left main stent was full of clot and the Angiojet was started however she went into VT/ VF and was shocked x1 (200J)  IV amiodarone bolus was given and amnio drip started  Patient was intubated by Dr Maria Luz Tolbert  Intra-aortic balloon pump was inserted in the right femoral artery  Demetrio olsen stat and   and present at bedside  Magnesium was 1 5 and she is receiving 2 g of IV magnesium  Historical Information   Past Medical History:   Diagnosis Date    Acid reflux     [facility]     Diabetes mellitus (HCC)     NIDDM    Edema     bles    Full dentures     History of hepatitis C     History of shingles     healed    Hx of renal calculi     Hyperlipidemia     Hypertension     Kidney stone     Liver disease     Nocturnal leg cramps     OA (osteoarthritis) of knee     left    PVD (peripheral vascular disease) ([facility] )     S/P insertion of iliac artery stent     miles    Urinary incontinence     Wears glasses      Past Surgical History:   Procedure Laterality Date    APPENDECTOMY      CARDIAC CATHETERIZATION N/A 5/24/2022    Procedure: Cardiac Coronary Angiogram;  Surgeon: Dahlia Rajan MD;  Location: [facility] CARDI[facility] CATH LAB; Service: Cardiology    CARDIAC CATHETERIZATION N/A 5/24/2022    Procedure: Cardiac pci;  Surgeon: Dahlia Rajan MD;  Location: [facility] CARDI[facility]; Service: Cardiology    CARDIAC CATHETERIZATION Left 5/24/2022    Procedure: Cardiac Left Heart Cath;  Surgeon: Dahlia Rajan MD;  Location: [facility] CARDIAC CATH LAB; Service: Cardiology    CARDIAC CATHETERIZATION  5/24/2022    Procedure: Cardiac catheterization;  Surgeon: Dahlia Rajan MD;  Location: [facility] CARDIAC CATH LAB;   Service: Cardiology    COLONOSCOPY      ESOPHAGEAL DILATION      ESOPHAGOGASTRODUODENOSCOPY      ILIAC ARTERY STENT Bilateral     KIDNEY STONE SURGERY      [place] TOTAL KNEE ARTHROPLASTY Left 3/7/2017    Procedure: ARTHROPLASTY KNEE TOTAL;  Surgeon: Vi Devi MD;  Location: [facility];  Service: Orthopedics     Social History   Social History     Substance and Sexual Activity   Alcohol Use Never     Social History     Substance and Sexual Activity   Drug Use No     Social History     Tobacco Use   Smoking Status Former Smoker    Quit date: 2001    Years since quittin 5   Smokeless Tobacco Never Used     Family History:   Family History   Family history unknown: Yes       Meds/Allergies   PTA meds:   Prior to Admission Medications   Prescriptions Last Dose Informant Patient Reported? Taking? Melatonin 5 MG TABS  Self Yes No   Sig: Take 5 mg by mouth   aspirin 81 mg chewable tablet   No No   Sig: Chew 1 tablet (81 mg total) daily   atorvastatin (LIPITOR) 40 mg tablet   No No   Sig: Take 1 tablet (40 mg total) by mouth daily with dinner   benzonatate (TESSALON PERLES) 100 mg capsule   Yes No   Sig: Take 100 mg by mouth as needed in the morning and 100 mg as needed at noon and 100 mg as needed in the evening for cough  clopidogrel (PLAVIX) 75 mg tablet   No No   Sig: Take 1 tablet (75 mg total) by mouth daily   cyclobenzaprine (FLEXERIL) 10 mg tablet  Self Yes No   Sig: Cyclobenzaprine HCl - 10 MG Oral Tablet   Refills: 0    Active- 1 tab q hs prn - pt reporting taking 0 5 tablet if needed   fluticasone (FLONASE) 50 mcg/act nasal spray   Yes No   Sig: Fluticasone Propionate 50 MCG/ACT Nasal Suspension   Refills: 0    Active- 2 sprays each nostril daily prn   Patient not taking: No sig reported   fluticasone-umeclidinium-vilanterol (Trelegy Ellipta) 100-62 5-25 MCG/INH inhaler   Yes No   Sig: Inhale 1 puff daily Rinse mouth after use     furosemide (Lasix) 40 mg tablet   No No   Sig: Take 1 tablet (40 mg total) by mouth daily   lansoprazole (PREVACID) 30 mg capsule  Self Yes No   Sig: Take 30 mg by mouth daily   meclizine (ANTIVERT) 25 mg tablet  Self Yes No   Sig: Take 25 mg by mouth daily    metFORMIN (GLUCOPHAGE) 850 mg tablet  Self Yes No   Sig: Take 850 mg by mouth 2 (two) times a day with meals   metoprolol tartrate (LOPRESSOR) 25 mg tablet   Yes No   Sig: Take 25 mg by mouth every 12 (twelve) hours   montelukast (SINGULAIR) 10 mg tablet  Self Yes No   Sig: Take 10 mg by mouth   nitroglycerin (NITROSTAT) 0 4 mg SL tablet   No No   Sig: Place 1 tablet (0 4 mg total) under the tongue every 5 (five) minutes as needed for chest pain      Facility-Administered Medications: None     Allergies   Allergen Reactions    Codeine GI Intolerance    Compazine [Prochlorperazine] Other (See Comments)     Eyes rolled up in head    Tramadol GI Intolerance       Objective   Vitals: not currently breastfeeding  No intake or output data in the 24 hours ending 06/01/22 1008    Invasive Devices  Report    Peripheral Intravenous Line  Duration           Peripheral IV 06/01/22 Left Hand <1 day    Peripheral IV 06/01/22 Right Wrist <1 day          Line  Duration           Arterial Sheath 6 Fr  Right Femoral <1 day    IABP 7 5 Fr  40 mL <1 day    Venous Sheath 8 Fr  Left Femoral <1 day                Review of Systems:  Review of Systems   All other systems reviewed and are negative  Physical Exam  Constitutional:       Comments: Pre intubation was awake     HENT:      Head: Normocephalic and atraumatic  Cardiovascular:      Rate and Rhythm: Bradycardia present  Comments: No audible PT or DT pulses left  Dopler pulses on Right PT no pedal  Abdominal:      General: Abdomen is flat  Musculoskeletal:      Right lower leg: Edema present  Left lower leg: Edema present  Skin:     Coloration: Skin is pale  Comments: Diaphoretic   Neurological:      Mental Status: She is alert               Lab Results:   CBC with diff:   Results from last 7 days   Lab Units 06/01/22  0824   WBC Thousand/uL 7 53   RBC Million/uL 4 06   HEMOGLOBIN g/dL 11 3*   HEMATOCRIT % 37 2   MCV fL 92   MCH pg 27 8   MCHC g/dL 30 4*   RDW % 14 3   MPV fL 10 6   PLATELETS Thousands/uL 274     Magnesium:   Results from last 7 days   Lab Units 06/01/22  0824   MAGNESIUM mg/dL 1 5*         [facility]/ Allscripts/Care Everywhere records reviewed:     ** Please Note: Fluency DirectDictation voice to text software may have been used in the creation of this document   **

## 2022-06-01 NOTE — PROGRESS NOTES
Progress Note - Critical Care   Mg Paredes 67 y o  female MRN: [id number]  Unit/Bed#: [facility] 515-82 Encounter: [id number]    Assessment: Acute Ant MI with Cardiogenic Shock with Thrombosis of LAD and Clot burden of LMCA (indwelling NEELA) with unsuccessful attempt at PCI revascularization    Plan: Medical Therapy and stabilization, Grim prognosis         Neuro: Sedated for intubation in Interventional Suite, ALert and non focal at this time  Cont sedation                 CV: IABP 1:1, Norepi gtt, amiodarone gtt    DAPT, Getting 2D ECHO    CTS again declines operative revascularization due to Aortic  Calcification, poor conduit and sig comorbidities   Working toward stability and potential transfer request to [address]  team                 Lung: Lung protective Mech ventilatory support                 GI: NPO is soft                 FEN: Diuresis if possible                 : Topete to be placed                 ID: NA                 Heme: [place]ow H/H                 Endo: Glycemic Control with goal BG <180                            Msk/Skin: SKin care                 Disposition: Critically ill with grave condition    Chief Complaint: Substernal CP with ambulation    HPI/24hr events: STEMI, anterior    Physical Exam: Ill appearing  Poorly perfused  Cool to touch  Chest Rales bilat  Cor reg  abd soft  Ext cold, no edema, poor pul;ses      Vitals:    22 1109   SpO2: 91%             Temperature:   Temp (24hrs), Av 9 °F (36 6 °C), Min:97 9 °F (36 6 °C), Max:97 9 °F (36 6 °C)    Current:      Weights: There is no height or weight on file to calculate BMI    Weight (last 2 days)     None          Hemodynamic Monitoring:  PAP:       Non-Invasive/Invasive Ventilation Settings:  Respiratory  Report   Lab Data (Last 4 hours)       1128            pH, Arterial       7 348             pCO2, Arterial       34 8             pO2, Arterial       137 5             HCO3, Arterial       18 7 Base Excess, Arterial       -6 2                  O2/Vent Data       06/01 1109   Most Recent         Vent Mode AC/VC  AC/VC      Resp Rate (BPM) (BPM) 20  20      Vt (mL) (mL) 450  450      FIO2 (%) (%) 100  100      PEEP (cmH2O) (cmH2O) 12  12      Patient safety screen outcome: Failed  Failed      MV 9 13  9 13                Lab Results   Component Value Date    PHART 7 348 (L) 06/01/2022    [id number] 34 8 (L) 06/01/2022    PO2ART 137 5 (H) 06/01/2022    [id number] 18 7 (L) 06/01/2022    BEART -6 2 06/01/2022    SOURCE Line, Arterial 06/01/2022     SpO2: SpO2: 91 %    Intake and Outputs:  I/O     None        UOP: 50/hour   Nutrition:         Labs:   Results from last 7 days   Lab Units 06/01/22  1136 06/01/22  1013 06/01/22  0824   WBC Thousand/uL 22 70*  --  7 53   HEMOGLOBIN g/dL 11 2*  --  11 3*   I STAT HEMOGLOBIN g/dl  --  11 2*  --    HEMATOCRIT % 36 4  --  37 2   HEMATOCRIT, ISTAT %  --  33*  --    PLATELETS Thousands/uL 208  --  274   NEUTROS PCT %  --   --  43   MONOS PCT %  --   --  11      Results from last 7 days   Lab Units 06/01/22  1013 06/01/22  0824 05/26/22  0519   SODIUM mmol/L  --  140 140   POTASSIUM mmol/L  --  3 9 3 8   CHLORIDE mmol/L  --  110* 109*   CO2 mmol/L  --  20* 22   CO2, I-STAT mmol/L 16*  --   --    BUN mg/dL  --  6 9   CREATININE mg/dL  --  0 80 1 00   CALCIUM mg/dL  --  9 2 9 1   ALK PHOS U/L  --  85  --    ALT U/L  --  28  --    AST U/L  --  58*  --    GLUCOSE, ISTAT mg/dl 271*  --   --      Results from last 7 days   Lab Units 06/01/22  0824   MAGNESIUM mg/dL 1 5*                  No results found for: TROPONINI    Imaging:  I have personally reviewed pertinent films in PACS and with Interventional Cardiologist    EKG: SR with ST elevation in ant leads    Micro:  No results found for: JUAN Chery    Allergies:    Allergies   Allergen Reactions    Codeine GI Intolerance    Compazine [Prochlorperazine] Other (See Comments)     Eyes rolled up in head    Tramadol GI Intolerance       Medications:   Scheduled Meds:    Continuous Infusions:amiodarone, 1 mg/min  fentaNYL, 50 mcg/hr  insulin regular (HumuLIN R,NovoLIN R) infusion, 0 3-21 Units/hr  norepinephrine, 1-30 mcg/min  sodium bicarbonate infusion, 125 mL/hr      PRN Meds:  acetaminophen, 650 mg, Q6H PRN  fentanyl citrate (PF), 50 mcg, Q1H PRN  ondansetron, 4 mg, Q6H PRN        VTE Pharmacologic Prophylaxis: Heparin  VTE Mechanical Prophylaxis: sequential compression device    Invasive lines and devices: Invasive Devices  Report    Peripheral Intravenous Line  Duration           Peripheral IV 06/01/22 Left Hand <1 day    Peripheral IV 06/01/22 Right Wrist <1 day          Line  Duration           Venous Sheath -- days    Arterial Sheath 6 Fr  Right Femoral <1 day    IABP 7 5 Fr  40 mL <1 day    Venous Sheath 8 Fr  Left Femoral <1 day                   Counseling / Coordination of Care  Total Critical Care time spent 55 minutes excluding procedures, teaching and family updates  Code Status: Level 1 - Full Code     Portions of the record may have been created with voice recognition software  Occasional wrong word or "sound a like" substitutions may have occurred due to the inherent limitations of voice recognition software  Read the chart carefully and recognize, using context, where substitutions have occurred       Trevon Lehman MD

## 2022-06-01 NOTE — ED NOTES
While speaking with this patient RN noticed patient having slurred speech, and staring off into the ceiling  Provider called to the bedside  Stroke scaled negative, after 2 minutes, patient resumed to her baseline        Aubrey Quesada RN  06/01/22 8201

## 2022-06-01 NOTE — ED PROVIDER NOTES
History  Chief Complaint   Patient presents with    Chest Pain     Left sided chest pain when arriving to [facility] for a study  Took a nitro and brought up to ER by hospital staff   Shortness of Breath     30-year-old female with a history of hypertension, diabetes, peripheral vascular disease, CAD status post recent cardiac catheterization with 2 stent placement with known triple-vessel disease not amenable to CABG at this time presenting from the vascular lab for evaluation of chest pain  Patient states it started suddenly while she was walking from her car to the vascular lab  She notes a midsternal chest pain that she is unable to describe but states that it is constant in nature without radiation  She has associated shortness of breath, nausea, nonbloody, nonbilious emesis  She denies alleviating or aggravating factors  Patient took her nitroglycerin without significant improvement  Patient states she did not take her Eliquis today but has been taking it secondary to DVT  Patient is not sure if she is taking aspirin or Plavix  She denies fever, chills, upper respiratory symptoms, abdominal pain, diarrhea, dysuria, leg swelling  On review of the patient's chart, she was discovered to have triple-vessel disease in April 2022  Patient was to undergo CABG but needed to undergo PCI first   This was performed on 05/24/2022  Patient was discharged home with outpatient cardiology follow-up  Patient is unclear who her cardiologist is or if it as at [address] [place] [address] or [facility]  Patient was instructed to discontinue her Eliquis but she has still been taking it  Prior to Admission Medications   Prescriptions Last Dose Informant Patient Reported? Taking?    Melatonin 5 MG TABS  Self Yes No   Sig: Take 5 mg by mouth   aspirin 81 mg chewable tablet   No No   Sig: Chew 1 tablet (81 mg total) daily   atorvastatin (LIPITOR) 40 mg tablet   No No   Sig: Take 1 tablet (40 mg total) by mouth daily with dinner benzonatate (TESSALON PERLES) 100 mg capsule   Yes No   Sig: Take 100 mg by mouth as needed in the morning and 100 mg as needed at noon and 100 mg as needed in the evening for cough  clopidogrel (PLAVIX) 75 mg tablet   No No   Sig: Take 1 tablet (75 mg total) by mouth daily   cyclobenzaprine (FLEXERIL) 10 mg tablet  Self Yes No   Sig: Cyclobenzaprine HCl - 10 MG Oral Tablet   Refills: 0    Active- 1 tab q hs prn - pt reporting taking 0 5 tablet if needed   fluticasone (FLONASE) 50 mcg/act nasal spray   Yes No   Sig: Fluticasone Propionate 50 MCG/ACT Nasal Suspension   Refills: 0    Active- 2 sprays each nostril daily prn   Patient not taking: No sig reported   fluticasone-umeclidinium-vilanterol (Trelegy Ellipta) 100-62 5-25 MCG/INH inhaler   Yes No   Sig: Inhale 1 puff daily Rinse mouth after use     furosemide (Lasix) 40 mg tablet   No No   Sig: Take 1 tablet (40 mg total) by mouth daily   lansoprazole (PREVACID) 30 mg capsule  Self Yes No   Sig: Take 30 mg by mouth daily   meclizine (ANTIVERT) 25 mg tablet  Self Yes No   Sig: Take 25 mg by mouth daily    metFORMIN (GLUCOPHAGE) 850 mg tablet  Self Yes No   Sig: Take 850 mg by mouth 2 (two) times a day with meals   metoprolol tartrate (LOPRESSOR) 25 mg tablet   Yes No   Sig: Take 25 mg by mouth every 12 (twelve) hours   montelukast (SINGULAIR) 10 mg tablet  Self Yes No   Sig: Take 10 mg by mouth   nitroglycerin (NITROSTAT) 0 4 mg SL tablet   No No   Sig: Place 1 tablet (0 4 mg total) under the tongue every 5 (five) minutes as needed for chest pain      Facility-Administered Medications: None       Past Medical History:   Diagnosis Date    Acid reflux     Carotid artery disease (HCC)     Diabetes mellitus (HCC)     NIDDM    Edema     bles    Full dentures     History of hepatitis C     History of shingles     healed    Hx of renal calculi     Hyperlipidemia     Hypertension     Kidney stone     Liver disease     Nocturnal leg cramps     OA (osteoarthritis) of knee     left    PVD (peripheral vascular disease) ([address] )     S/P insertion of iliac artery stent     miles    Urinary incontinence     Wears glasses        Past Surgical History:   Procedure Laterality Date    APPENDECTOMY      CARDIAC CATHETERIZATION N/A 2022    Procedure: Cardiac Coronary Angiogram;  Surgeon: Emery Raya MD;  [facility]; Service: Cardiology    CARDIAC CATHETERIZATION N/A 2022    Procedure: Cardiac pci;  Surgeon: Emery Raya MD;  [facility]; Service: Cardiology    CARDIAC CATHETERIZATION Left 2022    Procedure: Cardiac Left Heart Cath;  Surgeon: Emery Raya MD;  [facility]; Service: Cardiology    CARDIAC CATHETERIZATION  2022    Procedure: Cardiac catheterization;  Surgeon: Emery Raya MD;  [facility]; Service: Cardiology    COLONOSCOPY      ESOPHAGEAL DILATION      ESOPHAGOGASTRODUODENOSCOPY      ILIAC ARTERY STENT Bilateral     KIDNEY STONE SURGERY      MS TOTAL KNEE ARTHROPLASTY Left 3/7/2017    Procedure: ARTHROPLASTY KNEE TOTAL;  Surgeon: Mala Killian MD;  Location: [facility];  Service: Orthopedics       Family History   Family history unknown: Yes     I have reviewed and agree with the history as documented  E-Cigarette/Vaping    E-Cigarette Use Never User      E-Cigarette/Vaping Substances    Nicotine No     THC No     CBD No     Flavoring No     Other No     Unknown No      Social History     Tobacco Use    Smoking status: Former Smoker     Quit date: 2001     Years since quittin 5    Smokeless tobacco: Never Used   Vaping Use    Vaping Use: Never used   Substance Use Topics    Alcohol use: Never    Drug use: No       Review of Systems   Constitutional: Positive for diaphoresis  Negative for chills and fever  HENT: Negative for congestion, rhinorrhea and sore throat      Eyes: Negative for pain, discharge and visual disturbance  Respiratory: Positive for shortness of breath  Negative for cough  Cardiovascular: Positive for chest pain  Negative for palpitations and leg swelling  Gastrointestinal: Positive for nausea and vomiting  Negative for abdominal pain and diarrhea  Genitourinary: Negative for dysuria, frequency and hematuria  Musculoskeletal: Negative for arthralgias and myalgias  Skin: Negative for color change and rash  Neurological: Positive for light-headedness  Negative for dizziness, weakness, numbness and headaches  Hematological: Does not bruise/bleed easily  Physical Exam  Physical Exam  Vitals and nursing note reviewed  Constitutional:       General: She is in acute distress  Appearance: She is ill-appearing and diaphoretic  HENT:      Head: Normocephalic and atraumatic  Eyes:      Extraocular Movements: Extraocular movements intact  Pupils: Pupils are equal, round, and reactive to light  Cardiovascular:      Rate and Rhythm: Normal rate and regular rhythm  Pulmonary:      Effort: Pulmonary effort is normal  No respiratory distress  Breath sounds: Normal breath sounds  No wheezing, rhonchi or rales  Chest:      Chest wall: No mass or tenderness  Abdominal:      Palpations: Abdomen is soft  Tenderness: There is no abdominal tenderness  There is no guarding or rebound  Musculoskeletal:      Cervical back: Neck supple  Right lower leg: No tenderness  No edema  Left lower leg: No tenderness  No edema  Skin:     General: Skin is warm  Capillary Refill: Capillary refill takes 2 to 3 seconds  Findings: No rash  Neurological:      General: No focal deficit present  Mental Status: She is alert and oriented to person, place, and time  Psychiatric:         Mood and Affect: Mood is anxious           Behavior: Behavior normal          Vital Signs  ED Triage Vitals   Temperature Pulse Respirations Blood Pressure SpO2   06/01/22 0821 06/01/22 0821 06/01/22 0821 06/01/22 0821 06/01/22 0821   97 9 °F (36 6 °C) 95 22 138/74 100 %      Temp Source Heart Rate Source Patient Position - Orthostatic VS BP Location FiO2 (%)   06/01/22 0821 06/01/22 0900 06/01/22 0821 06/01/22 0821 --   Oral Monitor Lying Left leg       Pain Score       06/01/22 0821       9           Vitals:    06/01/22 0821 06/01/22 0900   BP: 138/74 (!) 91/32   Pulse: 95 59   Patient Position - Orthostatic VS: Lying Lying         Visual Acuity  Visual Acuity    Flowsheet Row Most Recent Value   L Pupil Size (mm) 2   R Pupil Size (mm) 2          ED Medications  Medications   nitroglycerin (NITROSTAT) SL tablet 0 4 mg (has no administration in time range)   heparin (porcine) 25,000 units in D5W 250 mL infusion (premix) (has no administration in time range)   aspirin chewable tablet 324 mg (324 mg Oral Given 6/1/22 0833)   ondansetron (ZOFRAN) injection 4 mg (4 mg Intravenous Given 6/1/22 0829)   fentanyl citrate (PF) 100 MCG/2ML 50 mcg (50 mcg Intravenous Given 6/1/22 0832)   ticagrelor (BRILINTA) tablet 180 mg (180 mg Oral Given 6/1/22 0855)   heparin (porcine) injection 4,000 Units (4,000 Units Intravenous Given 6/1/22 0902)       Diagnostic Studies  Results Reviewed     Procedure Component Value Units Date/Time    APTT [[id number]] Updated: 06/01/22 0859    Lab Status: No result Specimen: Blood from Arm, Left     Protime-INR [[id number]] Updated: 06/01/22 0858    Lab Status: No result Specimen: Blood from Arm, Left     HS Troponin 0hr (reflex protocol) [[id number]]  (Normal) Collected: 06/01/22 0824    Lab Status: Final result Specimen: Blood from Arm, Left Updated: 06/01/22 0858     hs TnI 0hr 33 ng/L     HS Troponin I 2hr [[id number]]     Lab Status: No result Specimen: Blood     NT-BNP PRO [[id number]]  (Abnormal) Collected: 06/01/22 0824    Lab Status: Final result Specimen: Blood from Arm, Left Updated: 06/01/22 0856     NT-proBNP 3,730 pg/mL     APTT six (6) hours after Heparin bolus/drip initiation or dosing change [[id number]]     Lab Status: No result Specimen: Blood     Magnesium [[id number]]  (Abnormal) Collected: 06/01/22 0824    Lab Status: Final result Specimen: Blood from Arm, Left Updated: 06/01/22 0848     Magnesium 1 5 mg/dL     Narrative:      Hemolysis    Comprehensive metabolic panel [[id number]]  (Abnormal) Collected: 06/01/22 0824    Lab Status: Final result Specimen: Blood from Arm, Left Updated: 06/01/22 0848     Sodium 140 mmol/L      Potassium 3 9 mmol/L      Chloride 110 mmol/L      CO2 20 mmol/L      ANION GAP 10 mmol/L      BUN 6 mg/dL      Creatinine 0 80 mg/dL      Glucose 158 mg/dL      Calcium 9 2 mg/dL      AST 58 U/L      ALT 28 U/L      Alkaline Phosphatase 85 U/L      Total Protein 7 3 g/dL      Albumin 3 9 g/dL      Total Bilirubin 0 94 mg/dL      eGFR 73 ml/min/1 73sq m     Narrative:      Hemolysis  National Kidney Disease Foundation guidelines for Chronic Kidney Disease (CKD):     Stage 1 with normal or high GFR (GFR > 90 mL/min/1 73 square meters)    Stage 2 Mild CKD (GFR = 60-89 mL/min/1 73 square meters)    Stage 3A Moderate CKD (GFR = 45-59 mL/min/1 73 square meters)    Stage 3B Moderate CKD (GFR = 30-44 mL/min/1 73 square meters)    Stage 4 Severe CKD (GFR = 15-29 mL/min/1 73 square meters)    Stage 5 End Stage CKD (GFR <15 mL/min/1 73 square meters)  Note: GFR calculation is accurate only with a steady state creatinine    Fingerstick Glucose (POCT) [[id number]]  (Abnormal) Collected: 06/01/22 0809    Lab Status: Final result Updated: 06/01/22 0844     POC Glucose 162 mg/dl     CBC and differential [[id number]]  (Abnormal) Collected: 06/01/22 0824    Lab Status: Final result Specimen: Blood from Arm, Left Updated: 06/01/22 0831     WBC 7 53 Thousand/uL      RBC 4 06 Million/uL      Hemoglobin 11 3 g/dL      Hematocrit 37 2 %      MCV 92 fL      MCH 27 8 pg      MCHC 30 4 g/dL      RDW 14 3 %      MPV 10 6 fL      Platelets 846 Thousands/uL nRBC 0 /100 WBCs      Neutrophils Relative 43 %      Immat GRANS % 0 %      Lymphocytes Relative 42 %      Monocytes Relative 11 %      Eosinophils Relative 3 %      Basophils Relative 1 %      Neutrophils Absolute 3 22 Thousands/µL      Immature Grans Absolute 0 02 Thousand/uL      Lymphocytes Absolute 3 18 Thousands/µL      Monocytes Absolute 0 81 Thousand/µL      Eosinophils Absolute 0 24 Thousand/µL      Basophils Absolute 0 06 Thousands/µL                  XR chest 1 view portable    (Results Pending)              Procedures  ECG 12 Lead Documentation Only    Date/Time: 6/1/2022 8:28 AM  Performed by: Rosales Garnica MD  Authorized by: Rosales Garnica MD     Indications / Diagnosis:  Chest pain  ECG reviewed by me, the ED Provider: yes    Patient location:  ED  Previous ECG:     Previous ECG:  Compared to current    Comparison ECG info:  5/20/2022    Similarity:  Changes noted  Interpretation:     Interpretation: non-specific    Rate:     ECG rate:  83    ECG rate assessment: normal    Rhythm:     Rhythm: sinus rhythm    Ectopy:     Ectopy: PVCs      PVCs:  Infrequent  QRS:     QRS axis:  Normal    QRS intervals:  Normal  Conduction:     Conduction: normal    ST segments:     ST segments:  Normal  T waves:     T waves: normal    Comments:      Low voltage  When compared to previous EKG, TWI in anterolateral leads have resolved  No other acute findings    ECG 12 Lead Documentation Only    Date/Time: 6/1/2022 8:50 AM  Performed by: Rosales Garnica MD  Authorized by: Rosales Garnica MD     Indications / Diagnosis:  Worsening chest pain with radiation  ECG reviewed by me, the ED Provider: yes    Patient location:  ED  Previous ECG:     Previous ECG:  Compared to current    Similarity:  Changes noted  Interpretation:     Interpretation: abnormal    Rate:     ECG rate:  82    ECG rate assessment: normal    Rhythm:     Rhythm: sinus rhythm    Ectopy:     Ectopy: none QRS:     QRS axis:  Normal    QRS intervals:  Normal  Conduction:     Conduction: normal    ST segments:     ST segments:  Abnormal    Elevation:  I and aVL    Depression:  II, III and aVF  T waves:     T waves: inverted      Inverted:  V2, V3, V4 and V5  Comments:      Acute lateral MI with reciprocal changes  CriticalCare Time  Performed by: Gerri Nyhan, MD  Authorized by: Gerri Nyhan, MD     Critical care provider statement:     Critical care time (minutes):  35    Critical care time was exclusive of:  Separately billable procedures and treating other patients and teaching time    Critical care was necessary to treat or prevent imminent or life-threatening deterioration of the following conditions:  Cardiac failure, CNS failure or compromise, circulatory failure and shock    Critical care was time spent personally by me on the following activities:  Obtaining history from patient or surrogate, development of treatment plan with patient or surrogate, discussions with consultants, evaluation of patient's response to treatment, examination of patient, review of old charts, re-evaluation of patient's condition, ordering and review of radiographic studies, ordering and review of laboratory studies and ordering and performing treatments and interventions             ED Course  ED Course as of 06/01/22 0917   Wed Jun 01, 2022   0832 Hemoglobin(!): 11 3  stable   0832 CBC and differential(!)  Stable anemia   0906 STEMI alert called and discussed with cardiology  Loaded with Brillinta, aspirin, and heparin  Transport team immediately at bedside  Patient transferred emergently to [facility] for cardiac catheterization  SBIRT 20yo+    Flowsheet Row Most Recent Value   SBIRT (23 yo +)    In order to provide better care to our patients, we are screening all of our patients for alcohol and drug use  Would it be okay to ask you these screening questions?  Unable to answer at this time Filed at: 06/01/2022 9794                    MDM  Number of Diagnoses or Management Options  Acute ST elevation myocardial infarction (STEMI), unspecified artery [facility])  Diagnosis management comments: 69-year-old female with a history of hypertension, diabetes, peripheral vascular disease, and recent stent placement for CAD presenting for evaluation of chest pain  Chest pain is associated dyspnea, nausea, vomiting, and diaphoresis  Review of the patient's chart shows she had a stent placed to the left main and LAD last week  Unclear if patient is taking her anti-platelet agents at home  States she is still taking her Eliquis  EKG, chest x-ray, and laboratory studies ordered to evaluate for coronary ischemia, dysrhythmia, heart failure, evidence for aneurysm, ventricular wall rupture, pneumonia, aortic dissection, electrolyte derangement, dehydration, anemia  Initial EKG shows normal sinus rhythm without evidence of ischemia or malignant dysrhythmia  Patient ordered Zofran, fentanyl, and nitroglycerin  However, patient then developed worsening chest pain with radiation into the right side of her chest and upper neck  Nitroglycerin was held  Repeat EKG shows ST segment elevations in the lateral leads (1 in aVL) with reciprocal changes in the inferior leads and T-wave inversions in the anterior lateral precordial leads  I was concerned for acute ST segment elevation myocardial infarction  STEMI alert was called to [facility]  Discussed with the cardiology NP Tyler Carney at [facility], who recommends transfer for emergent cardiac catheterization  Patient loaded with aspirin, heparin, and Brilinta  Patient had 1 episode of transient altered mental status which improved without other interventions  However, patient's blood pressure noted to be 90 systolic  IV fluid bolus initiated and additional nitroglycerin was held    She did not receive any additional nitroglycerin in the emergency department (total 1 dose prior to ED arrival)  Transport was emergently at bedside within minutes of transfer request  Unable to review laboratory studies due to emergent medical condition  Discussed risks and benefits of transfer and the patient verbally agreed; unable to sign EMTALA documents herself due to clinical condition  Chest x-ray was performed prior to transfer which appears to be similar in nature to previous studies, though I was unable to closely examine these images given acuity of patient's condition  Patient transferred emergently to [facility] for cardiac catheterization and further care  Disposition  Final diagnoses:   Acute ST elevation myocardial infarction (STEMI), unspecified artery ([address] )     Time reflects when diagnosis was documented in both MDM as applicable and the Disposition within this note     Time User Action Codes Description Comment    6/1/2022  8:55 AM Mer Molina Add [I21 3] Acute ST elevation myocardial infarction (STEMI), unspecified artery [facility])       ED Disposition     ED Disposition   Transfer to Another Facility-In Network    Condition   --    Date/Time   Wed Jun 1, 2022  8:55 AM    Comment   Reynaldo Blood should be transferred out to [facility]             MD Documentation    Jeanell Spatz Most Recent Value   Patient Condition The patient has been stabilized such that within reasonable medical probability, no material deterioration of the patient condition or the condition of the unborn child(gagan) is likely to result from the transfer   Reason for Transfer Level of Care needed not available at this facility   Benefits of Transfer Specialized equipment and/or services available at the receiving facility (Include comment)________________________   Risks of Transfer Potential for delay in receiving treatment, Potential deterioration of medical condition, Loss of IV, Increased discomfort during transfer   Accepting Physician Dr Zoie Guerrero Name, Johan Gonsalesma   Brandon Aldridge MD   Provider Certification General risk, such as traffic hazards, adverse weather conditions, rough terrain or turbulence, possible failure of equipment (including vehicle or aircraft), or consequences of actions of persons outside the control of the transport personnel, Unanticipated needs of medical equipment and personnel during transport, Risk of worsening condition, The possibility of a transport vehicle being unavailable      RN Documentation    [address] Name, Johan Nina      Follow-up Information    None         Patient's Medications   Discharge Prescriptions    No medications on file       No discharge procedures on file      PDMP Review     None          ED Provider  Electronically Signed by           Yolie Smyth MD  06/01/22 9007

## 2022-06-01 NOTE — PROGRESS NOTES
Patient on Levo 8mcg, Amio 1mg/min, started on Milrinone 0 25mcg  Initial ScVO2 70  Cold and mottled in extremities  Bicarb gtt started on metabolic acidosis  Lactic 6 3-9 0  Hypoxia requiring intermittent bagging and increase in PEEP 14 and 100% fio2  Lungs with diffuse pulmonary vascular congestion and post COVID fibrosis appearance  R fem swan in place with last CI 2 1  80mg IV Lasix given with moderate response  Patient did wake up, FC and FENTON  Fentanyl gtt 50mcg/hr for sedation  Insulin gtt for hyperglycemia/DM2  LM stenosis; ASA/Brilinita/Plavix Heparin gtt  Awaiting transfer to [place]       Critical care time provided between [id number]: 70 minutes

## 2022-06-01 NOTE — PROGRESS NOTES
CARDIAC SURGERY:    Briefly, this is a 66 y/o female known to me for a previous hospitalization for NSTEMI  She has a history of PVD with claudication and iliac stenting and prior DVT and she is a former smoker with mild COPD  At the time of initial presentation, her cath was reviewed with cardiology and PCI was determined to be feasible  Given her hostile, calcified aorta and poor venous conduit, as well as her comorbid conditions, the determination was to proceed with PCI  She was discharged after LM and LAD PCI and presented today with STEMI  She was taken to the cath lab in cardiogenic shock and cath showed an occluded LM stent, which was ballooned  At this time, given her severe peripheral arterial disease and poor surgical candidacy, she is not a candidate for ECMO or emergent operation  A balloon pump was placed in the cath lab  Recommend medical management and palliative care

## 2022-06-01 NOTE — EMTALA/ACUTE CARE TRANSFER
[facility] EMERGENCY DEPARTMENT  [address] [facility] [address]  [phone]  Dept: [facility] [place] TRANSFER CONSENT    NAME Jeremi VALLECILLO 1950                              MRN [id number]    I have been informed of my rights regarding examination, treatment, and transfer   by Dr Hari Forde: Specialized equipment and/or services available at the receiving facility (Include comment)________________________    Risks: Potential for delay in receiving treatment, Potential deterioration of medical condition, Loss of IV, Increased discomfort during transfer      Consent for Transfer:  I acknowledge that my medical condition has been evaluated and explained to me by the emergency department physician or other qualified medical person and/or my attending physician, who has recommended that I be transferred to the service of  Accepting Physician: Dr Mart Snyder at [address] [facility] Name, [address] : [address]  [address] [place] [address]  The above potential benefits of such transfer, the potential risks associated with such transfer, and the probable risks of not being transferred have been explained to me, and I fully understand them  The doctor has explained that, in my case, the benefits of transfer outweigh the risks  I agree to be transferred  I authorize the performance of emergency medical procedures and treatments upon me in both transit and upon arrival at the receiving facility  Additionally, I authorize the release of any and all medical records to the receiving facility and request they be transported with me, if possible  I understand that the safest mode of transportation during a medical emergency is an ambulance and that the Hospital advocates the use of this mode of transport   Risks of traveling to the receiving facility by car, including absence of medical control, life sustaining equipment, such as oxygen, and medical personnel has been explained to me and I fully understand them  (CARI CORRECT BOX BELOW)  [  ]  I consent to the stated transfer and to be transported by ambulance/helicopter  [  ]  I consent to the stated transfer, but refuse transportation by ambulance and accept full responsibility for my transportation by car  I understand the risks of non-ambulance transfers and I exonerate the Hospital and its staff from any deterioration in my condition that results from this refusal     X___________________________________________    DATE  22  TIME________  Signature of patient or legally responsible individual signing on patient behalf           RELATIONSHIP TO PATIENT_________________________          Provider Certification    NAME Antony Fleming                                         1950                              MRN [id number]    A medical screening exam was performed on the above named patient  Based on the examination:    Condition Necessitating Transfer The encounter diagnosis was Acute ST elevation myocardial infarction (STEMI), unspecified artery ([facility] [address] )      Patient Condition: The patient has been stabilized such that within reasonable medical probability, no material deterioration of the patient condition or the condition of the unborn child(gagan) is likely to result from the transfer    Reason for Transfer: Level of Care needed not available at this facility    Transfer Requirements: Noemi Perkins [address], [facility]   · Space available and qualified personnel available for treatment as acknowledged by    · Agreed to accept transfer and to provide appropriate medical treatment as acknowledged by       Dr Ligia Farrell  · Appropriate medical records of the examination and treatment of the patient are provided at the time of transfer   [address] _______  · Transfer will be performed by qualified personnel from    and appropriate transfer equipment as required, including the use of necessary and appropriate life support measures  Provider Certification: I have examined the patient and explained the following risks and benefits of being transferred/refusing transfer to the patient/family:  General risk, such as traffic hazards, adverse weather conditions, rough terrain or turbulence, possible failure of equipment (including vehicle or aircraft), or consequences of actions of persons outside the control of the transport personnel, Unanticipated needs of medical equipment and personnel during transport, Risk of worsening condition, The possibility of a transport vehicle being unavailable      Based on these reasonable risks and benefits to the patient and/or the unborn child(gagan), and based upon the information available at the time of the patients examination, I certify that the medical benefits reasonably to be expected from the provision of appropriate medical treatments at another medical facility outweigh the increasing risks, if any, to the individuals medical condition, and in the case of labor to the unborn child, from effecting the transfer      X____________________________________________ DATE 06/01/22        TIME_______      ORIGINAL - SEND TO MEDICAL RECORDS   COPY - SEND WITH PATIENT DURING TRANSFER

## 2022-06-01 NOTE — BRIEF OP NOTE (RAD/CATH)
Right femoral cath    LMCA angioplasty of stent placed 5/24, acute thrombosis  IABP and swan placement    66 yo cath [facility] 4/2022 for CABG, however presented [facility] [place] with worsening angina, recath with LAD occlusion at prior diagonal branch which was 90% at [address] [facility] [address]  Nonetheless, CT surgery turndown due to concern aortic calcification  Recath [facility] 5/24/2022 where LAD was found to be 100% occluded and LMCA significantly diseased  Underwent PCI with NEELA LMCA and LAD    Today, acute LMCA stent thrombosis  Angioplasty with 3 5 balloon into LCX established deidre 3 flow into LCX distal embolization into ramus  LAD remains occluded  Subsequently Had vtach/vfib and dccv  IABP placed and cardiac surgery contacted once again for reassessment of CABG candidacy given limited PCI options with clot burden and location  She was deemed not a candidate again  Patient to go to ICU for supportive/palliative care

## 2022-06-01 NOTE — DISCHARGE SUMMARY
Discharge Summary - Bari Nolan 67 y o  female MRN: [id number]    Unit/Bed#: [facility] 276-54 Encounter: [id number]    Admission Date:   Admission Orders (From admission, onward)     Ordered        06/01/22 0957  Inpatient Admission  Once                        Admitting Diagnosis: STEMI (ST elevation myocardial infarction) ([facility] [address] ) [I21 3]    HPI: 66 y/o female with PMHx type 2 diabetes, ischemic cardiomyopathy, peripheral arterial disease, DVT, GERD, and coronary artery disease with recent left main stenting who now presents with anterior ST-elevation MI and cardiogenic shock  Patient was hospitalized 1 week ago with anterior lateral STEMI, she underwent left main stenting and was discharged home  This morning she was at [facility] for vascular study and developed midsternal chest pain  Taken to the emergency department where she was found to have anterior ST-elevation MI, she was taken to the cath lab where her left main stent was found to be thrombosed  Attempted Angiojet however patient went into VT/VF and the procedure was aborted  Patient was intubated in the cath lab defibrillated x1, amnio bolus and drip started  An intra-aortic balloon pump was placed in the patient's transferred to the intensive care unit  Procedures Performed:   Orders Placed This Encounter   Procedures    Cardiac catheterization       Summary of Hospital Course:  Cardiac catheterization was reviewed by Cardiac surgery, given severely calcified aorta with poor venous conduit she was deemed not to be a surgical candidate at this institution  She was started on Milrinone and Levophed for hemodynamic support of her cardiogenic shock  Found to have metabolic acidosis, started on bicarb drip  After family discussion decision was made to transfer the patient to [address] [place] [address] for tertiary Cardiac Surgical Care      Significant Findings, Care, Treatment and Services Provided:   Anterolateral ST-elevation MI  Multivessel coronary artery disease with thrombosed left main stent  Intubation  Intra-aortic balloon pump placement    Complications:  Na    Discharge Diagnosis:  Cardiogenic shock, ST-elevation MI    Medical Problems             Resolved Problems  Date Reviewed: 5/26/2022   None                 Condition at Discharge: stable         Discharge instructions/Information to patient and family:   See after visit summary for information provided to patient and family  Provisions for Follow-Up Care:  See after visit summary for information related to follow-up care and any pertinent home health orders  PCP: Viraj Carney MD    Disposition: [facility]    Planned Readmission: No      Discharge Statement   I spent 30 minutes discharging the patient  This time was spent on the day of discharge  I had direct contact with the patient on the day of discharge  Additional documentation is required if more than 30 minutes were spent on discharge  Discharge Medications:  See after visit summary for reconciled discharge medications provided to patient and family

## 2022-06-01 NOTE — RESPIRATORY THERAPY NOTE
RT Ventilator Management Note  Janay Linda 67 y o  female MRN: [id number]  Unit/Bed#: [facility] CATH LAB ROOM Encounter: [id number]      Daily Screen         6/1/2022  1109             Patient safety screen outcome[de-identified] Failed (P)     Not Ready for Weaning due to[de-identified] FiO2 >60%;PEEP > 8cmH2O;Underline problem not resolved (P)               Physical Exam:   Assessment Type: (P) Assess only  General Appearance: (P) Unresponsive  Respiratory Pattern: (P) Assisted  Chest Assessment: (P) Chest expansion symmetrical  Bilateral Breath Sounds: (P) Clear  O2 Device: (P) vent      Resp Comments: (P) received pt from cath lab and placed on vent on current settings   will continue to monitor

## 2022-06-01 NOTE — ED NOTES
Patient reports she is feeling her chest pain radiate to the right side of the chest, feels worse  Depression noted on cardiac monitor, Dr Daniela Angulo made aware        Veronica Jaramillo RN  06/01/22 1735

## 2022-06-01 NOTE — ED NOTES
SHANTI critical care team at the bedside to transport [facility] will be initiating Heparin froilan Marcos RN  06/01/22 Jany Kumar RN  06/01/22 2517

## 2022-06-01 NOTE — Clinical Note
A [place] catheter was advanced under fluoroscopic guidance into the right heart and intra cardiac pressures were obtained  The catheter was secured in place for cardiac pressure monitoring in the nursing unit      @75

## 2022-06-02 NOTE — UTILIZATION REVIEW
Initial Clinical Review    Admission: Date/Time/Statement:   Admission Orders (From admission, onward)     Ordered        06/01/22 0957  Inpatient Admission  Once                      Orders Placed This Encounter   Procedures    Inpatient Admission     Standing Status:   Standing     Number of Occurrences:   1     Order Specific Question:   Level of Care     Answer:   Critical Care [15]     Order Specific Question:   Estimated length of stay     Answer:   More than 2 Midnights     Order Specific Question:   Certification     Answer:   I certify that inpatient services are medically necessary for this patient for a duration of greater than two midnights  See H&P and MD Progress Notes for additional information about the patient's course of treatment  Initial Presentation: 67 y o  female  Presented to [facility]  ED with sudden onset chest pain, midsternal, constant in nature without radiation  She reports it started on walking from her car to the vascular lab, to evaluate a lower leg claudication    She has associated SOB, nausea, and nonbloody, nonbilious emesis  She took nitroglycerine without significant improvement  She has a pmh of HTN, Dm, PVD, CAD s/p recent cardiac catherization with 2 stents placed for known triple vessel disease not amenable to CABG on 5/24/22  She had been discharged home with OP cardiology follow up  She was to stop Eliquis but she has still been taking it  In the ED her EKG revealed NSR with infrequent PVCs with T wave inversions in the anterolateral leads and St elevation in lead I and aVL  MI alert was initiated  She was [facility] on [facility]  For emergent cardiac catherization for STEMI, and suspected LMCA stent thrombosis  Plan angioplasty with large clot burden  On arrival to the labseh was diaphoretic, pale and c/o anterior chest pain  She was on Alex-Synepherine drip and difficulty obtaining accurate blood pressure on her   A venous sheath and arterial sheath was placed  She remained hypotensive  Cath was done which revealed the left main stent was full of clot and then Angiojet was started   She went into VT/VF and was shocked x 1( 200 J)  Amiodarone bolus and gtt started  She was intubated, intraaortic balloon pump was inserted in the R femoral artery  She is admitted inpatient to critical care  On multiple gtts,Milrinone and Levophed for hemodynamic support of her cardiogenic shock  Found to have metabolic acidosis, started on bicarb drip  After family discussion decision was made to transfer the patient to [facility] [address] for tertiary Cardiac Surgical Care         Cardiac Thoracic Surgery consult - 6/1 -  Known from prior hospitalization for NSTEMI  Hx of PVD with claudication and iliac stenting, prior DVT  He cath reviewes with cardiology and PCI was determined feasible  Given her hostile  Calcified aorta and poor venous condiut, along with comorbid conditions  S/p recent LA and LAD PCI  She presented today with STEMI  Taken to cath lab in cardiogenic shock, cath showed occluded LM stent which was ballooned  She remains not a candidate for ECMO or emergent operation  A balloon pump was placed in the cath lab  Plan medical management and palliative care            Vitals   Temperature Pulse Respirations Blood Pressure SpO2   06/01/22 1300 06/01/22 1135 06/01/22 1135 06/01/22 1300 06/01/22 1109   99 7 °F (37 6 °C) (!) 118 19 99/63 91 %      Temp Source Heart Rate Source Patient Position - Orthostatic VS BP Location FiO2 (%)   06/01/22 1300 -- -- -- --   Probe          Pain Score       06/01/22 1200       No Pain          Wt Readings from Last 1 Encounters:   06/01/22 87 1 kg (192 lb)     Additional Vital Signs:  Date/Time Temp Pulse Resp BP MAP (mmHg) MAP SpO2 CO CI CVP (mean)   06/01/22 1915 100 °F (37 8 °C) -- -- -- -- -- -- -- -- --   06/01/22 1900 -- 90 19 65/42 Abnormal  -- -- 84 % Abnormal  -- -- --   06/01/22 1815 -- 98 22 -- -- -- 87 % Abnormal  3 7 L/min 1 9 L/min/m2 23 mmHg   06/01/22 1636 -- 96 28 Abnormal  74/51 Abnormal  -- -- 73 % Abnormal  -- -- --   06/01/22 1436 -- 112 Abnormal  34 Abnormal  80/56 Abnormal  -- -- 89 % Abnormal  -- -- --   06/01/22 1425 -- 116 Abnormal  24 Abnormal  -- -- 112 mmHg 76 % Abnormal  3 6 L/min 1 9 L/min/m2 18 mmHg   06/01/22 1336 -- 115 Abnormal  20 83/54 Abnormal  -- -- 74 % Abnormal  -- -- --   06/01/22 1320 -- 117 Abnormal  -- 91/32 Abnormal  -- -- -- -- -- --   06/01/22 1300 99 7 °F (37 6 °C) 118 Abnormal  20 99/63 75 -- 76 % Abnormal  -- -- 18 mmHg   06/01/22 1140 -- 111 Abnormal  20 -- -- 101 mmHg 84 % Abnormal  3 2 L/min 1 7 L/min/m2 14 mmHg   06/01/22 1135 -- 118 Abnormal  19 -- -- -- 89 % Abnormal  -- -- --       Pertinent Labs/Diagnostic Test Results:   XR chest portable ICU   Final Result by Leonora Olson MD (06/01 1450)   Interval placement of endotracheal tube which terminates approximately 2 cm above the hayes      Persistent mild CHF               Workstation performed: [id number]           Results from last 7 days   Lab Units 06/01/22  1455   SARS-COV-2  Negative     Results from last 7 days   Lab Units 06/01/22  1136 06/01/22  1013 06/01/22  0824   WBC Thousand/uL 22 70*  --  7 53   HEMOGLOBIN g/dL 11 2*  --  11 3*   I STAT HEMOGLOBIN g/dl  --  11 2*  --    HEMATOCRIT % 36 4  --  37 2   HEMATOCRIT, ISTAT %  --  33*  --    PLATELETS Thousands/uL 208  --  274   NEUTROS ABS Thousands/µL  --   --  3 22         Results from last 7 days   Lab Units 06/01/22  1739 06/01/22  1136 06/01/22  1013 06/01/22  0824   SODIUM mmol/L 143 143  --  140   POTASSIUM mmol/L 3 3* 3 0*  --  3 9   CHLORIDE mmol/L 111* 109*  --  110*   CO2 mmol/L 22 21  --  20*   CO2, I-STAT mmol/L  --   --  16*  --    ANION GAP mmol/L 10 13  --  10   BUN mg/dL 9 7  --  6   CREATININE mg/dL 1 67* 1 20  --  0 80   EGFR ml/min/1 73sq m 30 44  --  73   CALCIUM mg/dL 8 5 8 6  --  9 2   CALCIUM, IONIZED, ISTAT mmol/L  -- --  1 19  --    MAGNESIUM mg/dL  --  2 8*  --  1 5*   PHOSPHORUS mg/dL  --  4 3*  --   --      Results from last 7 days   Lab Units 06/01/22  1136 06/01/22  0824   AST U/L 445* 58*   ALT U/L 53 28   ALK PHOS U/L 89 85   TOTAL PROTEIN g/dL 6 4 7 3   ALBUMIN g/dL 2 7* 3 9   TOTAL BILIRUBIN mg/dL 0 76 0 94     Results from last 7 days   Lab Units 06/01/22  2039 06/01/22  1810 06/01/22  1605 06/01/22  1250 06/01/22  0809   POC GLUCOSE mg/dl 211* 344* 367* 238* 162*     Results from last 7 days   Lab Units 06/01/22  1739 06/01/22  1136 06/01/22  0824   GLUCOSE RANDOM mg/dL 382* 283* 158*       Results from last 7 days   Lab Units 06/01/22  1733 06/01/22  1418 06/01/22  1128   PH ART  7 346* 7 307* 7 348*   PCO2 ART mm Hg 38 9 35 7* 34 8*   PO2 ART mm Hg 95 7 180 3* 137 5*   HCO3 ART mmol/L 20 8* 17 5* 18 7*   BASE EXC ART mmol/L -4 5 -8 0 -6 2   O2 CONTENT ART mL/dL 15 4* 17 5 16 9   O2 HGB, ARTERIAL % 96 4 98 4* 97 9*   ABG SOURCE  Line, Arterial Radial, Left Line, Arterial     Results from last 7 days   Lab Units 06/01/22  1455 06/01/22  1132   PH BERLIN  7 226* 7 259*   PCO2 BERLIN mm Hg 48 3 45 7   PO2 BERLIN mm Hg 44 1 42 8   HCO3 BERLIN mmol/L 19 6* 20 0*   BASE EXC BERLIN mmol/L -7 9 -6 9   O2 CONTENT BERLIN ml/dL 11 5 11 9   O2 HGB, VENOUS % 67 8 70 7     Results from last 7 days   Lab Units 06/01/22  1013   I STAT BASE EXC mmol/L -11*   I STAT O2 SAT % 100*   ISTAT PH ART  7 278*   I STAT ART PCO2 mm HG 32 1*   I STAT ART PO2 mm  0*   I STAT ART HCO3 mmol/L 15 0*       Results from last 7 days   Lab Units 06/01/22  1136 06/01/22  0824   HS TNI 0HR ng/L >22,973* 33     Results from last 7 days   Lab Units 06/01/22  1455   PROTIME seconds 14 4   INR  1 17   PTT seconds 41*     Results from last 7 days   Lab Units 06/01/22  1811 06/01/22  1455 06/01/22  1136   LACTIC ACID mmol/L 7 5* 9 0* 6 3*     Results from last 7 days   Lab Units 06/01/22  0824   NT-PRO BNP pg/mL 3,730*       Results from last 7 days   Lab Units 06/01/22  1455   INFLUENZA A PCR  Negative   INFLUENZA B PCR  Negative   RSV PCR  Negative       Past Medical History:   Diagnosis Date    Acid reflux     Carotid artery disease (HCC)     Diabetes mellitus (HCC)     NIDDM    Edema     bles    Full dentures     History of hepatitis C     History of shingles     healed    Hx of renal calculi     Hyperlipidemia     Hypertension     Kidney stone     Liver disease     Nocturnal leg cramps     OA (osteoarthritis) of knee     left    PVD (peripheral vascular disease) (HCC)     S/P insertion of iliac artery stent     miles    Urinary incontinence     Wears glasses      Present on Admission:   ST elevation myocardial infarction involving left main coronary artery (HCC)   Cardiogenic shock (HCC)   Renal artery stenosis (HCC)   Carotid stenosis, asymptomatic, bilateral   Aortoiliac stenosis (HCC)   Type 2 diabetes mellitus, without long-term current use of insulin ([facility])   Stenosis of left subclavian artery ([facility] [address] )   [facility] exacerbation [facility]   Obese      Admitting Diagnosis: STEMI (ST elevation myocardial infarction) ([facility] ) [I21 3]  Age/Sex: 67 y o  female       Admission Orders:  Scheduled Medications:  Continuous Infusions:  amiodarone, 1 mg/min  fentaNYL, 50 mcg/hr  insulin regular (HumuLIN R,NovoLIN R) infusion, 0 3-21 Units/hr  norepinephrine, 1-30 mcg/min  sodium bicarbonate infusion, 125 mL/hr        PRN Meds:  acetaminophen, 650 mg, Q6H PRN  fentanyl citrate (PF), 50 mcg, Q1H PRN  ondansetron, 4 mg, Q6H PRN      Network Utilization Review Department  ATTENTION: Please call with any questions or concerns to [phone] and carefully listen to the prompts so that you are directed to the right person  All voicemails are confidential   Ugo Gibbs all requests for admission clinical reviews, approved or denied determinations and any other requests to dedicated fax number below belonging to the campus where the patient is receiving treatment   List of dedicated fax numbers for the Facilities:  [address] [place] [address] DENIALS (Administrative/Medical Necessity) [phone] [address] (Maternity/NICU/Pediatrics) [phone] [address]  [address] [place] [address] [address] [place] [address] [address] [facility] [phone]

## 2022-06-02 NOTE — EMTALA/ACUTE CARE TRANSFER
[address] [address]  [address] [address]  Dept: [phone]      ACUTE CARE TRANSFER CONSENT    NAME Karyle Chamberlain                                         [birth year]                              MRN [id number]    I have been informed of my rights regarding examination, treatment, and transfer   by Dr Burgess Mynor MD    Benefits: Specialized equipment and/or services available at the receiving facility (Include comment)________________________ (tertiary cardiac surgery)    Risks: Potential for delay in receiving treatment, Potential deterioration of medical condition, Loss of IV, Increased discomfort during transfer, Possible worsening of condition or death during transfer      Consent for Transfer:  I acknowledge that my medical condition has been evaluated and explained to me by the treating physician or other qualified medical person and/or my attending physician, who has recommended that I be transferred to the service of  Accepting Physician: MARY KAY at [address] [facility] [address], [address] : [place], [place]  The above potential benefits of such transfer, the potential risks associated with such transfer, and the probable risks of not being transferred have been explained to me, and I fully understand them  The doctor has explained that, in my case, the benefits of transfer outweigh the risks  I agree to be transferred  I authorize the performance of emergency medical procedures and treatments upon me in both transit and upon arrival at the receiving facility  Additionally, I authorize the release of any and all medical records to the receiving facility and request they be transported with me, if possible  I understand that the safest mode of transportation during a medical emergency is an ambulance and that the Hospital advocates the use of this mode of transport   Risks of traveling to the receiving facility by car, including absence of medical control, life sustaining equipment, such as oxygen, and medical personnel has been explained to me and I fully understand them  (CARI CORRECT BOX BELOW)  [  ]  I consent to the stated transfer and to be transported by ambulance/helicopter  [  ]  I consent to the stated transfer, but refuse transportation by ambulance and accept full responsibility for my transportation by car  I understand the risks of non-ambulance transfers and I exonerate the Hospital and its staff from any deterioration in my condition that results from this refusal     X___________________________________________    DATE  22  TIME________  Signature of patient or legally responsible individual signing on patient behalf           RELATIONSHIP TO PATIENT_________________________          Provider Certification    NAME Karyle Chamberlain                                         1950                              MRN [id number]    A medical screening exam was performed on the above named patient    Based on the examination:    Condition Necessitating Transfer Cardiogenic shock    Patient Condition: The patient has been stabilized such that within reasonable medical probability, no material deterioration of the patient condition or the condition of the unborn child(gagan) is likely to result from the transfer    Reason for Transfer: No bed available at level of patient's needs    Transfer Requirements: [address], [place]   · Space available and qualified personnel available for treatment as acknowledged by PACS [phone]  · Agreed to accept transfer and to provide appropriate medical treatment as acknowledged by       [facility]  · Appropriate medical records of the examination and treatment of the patient are provided at the time of transfer   [address] _______  · Transfer will be performed by qualified personnel from [facility]  and appropriate transfer equipment as required, including the use of necessary and appropriate life support measures  Provider Certification: I have examined the patient and explained the following risks and benefits of being transferred/refusing transfer to the patient/family:  General risk, such as traffic hazards, adverse weather conditions, rough terrain or turbulence, possible failure of equipment (including vehicle or aircraft), or consequences of actions of persons outside the control of the transport personnel, Risk of worsening condition      Based on these reasonable risks and benefits to the patient and/or the unborn child(gagan), and based upon the information available at the time of the patients examination, I certify that the medical benefits reasonably to be expected from the provision of appropriate medical treatments at another medical facility outweigh the increasing risks, if any, to the individuals medical condition, and in the case of labor to the unborn child, from effecting the transfer      X____________________________________________ DATE 06/01/22        TIME_______      ORIGINAL - SEND TO MEDICAL RECORDS   COPY - SEND WITH PATIENT DURING TRANSFER

## 2022-06-02 NOTE — PROGRESS NOTES
Report given to [facility] CCRN/PHRN, Pt left on levo, milrinone, heparin, amio, and insulin  IABP intact upon d/c 1:1 ratio

## 2022-06-02 NOTE — PROCEDURES
Arterial Line Insertion    Date/Time: 6/1/2022 8:33 PM  Performed by: NATALIA Morales  Authorized by: Ruy Morales     Patient location:  ICU  Other Assisting Provider: Yes (comment) Martha Rodriguez    Consent:     Consent obtained:  Emergent situation  Universal protocol:     Patient identity confirmed:  Arm band  Indications:     Indications: hemodynamic monitoring, multiple ABGs, continuous blood pressure monitoring and frequent labs / infusion    Pre-procedure details:     Skin preparation:  Chlorhexidine  Procedure details:     Location / Tip of Catheter:  Radial    Laterality:  Left    Placement technique:  Ultrasound guided    Number of attempts:  4    Successful placement: yes      Transducer: waveform confirmed and dampened amplitude    Post-procedure details:     Post-procedure:  Sterile dressing applied, sutured and wrist guard applied

## 2022-06-03 NOTE — UTILIZATION REVIEW
Inpatient Admission Authorization Request   NOTIFICATION OF INPATIENT ADMISSION/INPATIENT AUTHORIZATION REQUEST   Unable to start auth on portal  I don't see an auth in place for this case      SERVICING FACILITY:   [facility]  Address: [address] [place] [address], [address] [place] [address]  Tax ID: [id number]  NPI: [id number]  Place of Service: [address] [place] [address] Code: 24     ATTENDING PROVIDER:  Attending Name and NPI#: Yuliana Townsend Md [[id number]]  Address: [address] [place] [address], [address] [place] [address]  Phone: [phone]     UTILIZATION REVIEW CONTACT:  [facility], Utilization   Network Utilization Review Department  Phone: [phone]  Fax: [phone]  Email: [email or web address]     PHYSICIAN ADVISORY SERVICES:  FOR [facility]OK-[id number] REVIEW - MEDICAL NECESSITY DENIAL  Phone: [phone]  Fax: [phone]  Email: [email or web address]     TYPE OF REQUEST:  Inpatient Status     ADMISSION INFORMATION:  ADMISSION DATE/TIME: 6/1/22  9:57 AM  PATIENT DIAGNOSIS CODE/DESCRIPTION:  STEMI (ST elevation myocardial infarction) ([facility] [address] ) [I21 3]  DISCHARGE DATE/TIME: 6/1/2022  8:45 PM   IMPORTANT INFORMATION:  Please contact the [facility] directly with any questions or concerns regarding this request  Department voicemails are confidential     Send requests for admission clinical reviews, concurrent reviews, approvals, and administrative denials due to lack of clinical to fax [phone]

## 2022-07-26 ENCOUNTER — TELEPHONE (OUTPATIENT)
Dept: VASCULAR SURGERY | Facility: CLINIC | Age: 72
End: 2022-07-26

## 2022-07-26 NOTE — TELEPHONE ENCOUNTER
Attempted to contact patient to schedule appointment(s) listed below  Requested patient call [phone] option 3 to schedule appointment(s)  Patient's appointment(s) are due now      Dopplers  [] Abdominal Aorta Iliac (AOIL)  [] Carotid (CV)   [] Celiac and/or Mesenteric  [] Endovascular Aortic Repair (EVAR)   [] Hemodialysis Access (HD)   [x] Lower Limb Arterial (LEXIE)  [] Lower Limb Venous Duplex with Reflux (LEVDR)  [] Renal Artery  [] Upper Limb Arterial (UEA)    [] Upper Limb Venous (UEV)    Advanced Imaging   [] CTA abdomen    [] CTA abdomen & pelvis    [] CT abdomen with/ without contrast  [] CT abdomen with contrast  [] CT abdomen without contrast    [] CT abdomen & pelvis with/ without contrast  [] CT abdomen & pelvis with contrast  [] CT abdomen & pelvis without contrast    Office Visit   [] New patient, patient last seen over 3 years ago  [] Risk factor modification (RFM)   [x] Follow up

## (undated) DEVICE — CATH DIAG 6FR IMPULSE 100CM FL4

## (undated) DEVICE — GLOVE INDICATOR PI UNDERGLOVE SZ 8 BLUE

## (undated) DEVICE — SWAN-GANZ VIP+ TRI-LUMEN INFUSION THERMODILUTION CATHETER: Brand: SWAN-GANZ VIP+

## (undated) DEVICE — CATH IVUS EAGLE EYE PLATINUM 5FR 150CM

## (undated) DEVICE — HI-TORQUE PILOT 50 GUIDE WIRE .014 STRAIGHT TIP 3.0 CM X 190 CM: Brand: HI-TORQUE PILOT

## (undated) DEVICE — TIBURON SPLIT SHEET: Brand: CONVERTORS

## (undated) DEVICE — CATH GUIDE LAUNCHER 6FR EBU 3.0

## (undated) DEVICE — NEEDLE 22 G X 1 1/2 SAFETY

## (undated) DEVICE — SAW BLADE RECIPROCATING 179

## (undated) DEVICE — DGW .035 FC J3MM 260CM TEF: Brand: EMERALD

## (undated) DEVICE — SUT VICRYL PLUS 1 CTX 36 IN VCP371H

## (undated) DEVICE — SCD SEQUENTIAL COMPRESSION COMFORT SLEEVE MEDIUM KNEE LENGTH: Brand: KENDALL SCD

## (undated) DEVICE — IMPERVIOUS STOCKINETTE: Brand: DEROYAL

## (undated) DEVICE — JP 3-SPRING RES W/10FR PVC DRAIN/TR: Brand: CARDINAL HEALTH

## (undated) DEVICE — HI-TORQUE PILOT 50 GUIDE WIRE .014 STRAIGHT TIP 3.0 CM X 300 CM: Brand: HI-TORQUE PILOT

## (undated) DEVICE — THE SIMPULSE SOLO SYSTEM WITH ULTREX RETRACTABLE SPLASH SHIELD TIP: Brand: SIMPULSE SOLO

## (undated) DEVICE — TREK CORONARY DILATATION CATHETER 2.50 MM X 15 MM / OVER-THE-WIRE: Brand: TREK

## (undated) DEVICE — SUT STRATAFIX SPIRAL PDS PLUS 1 CTX 18 IN SXPP1A400

## (undated) DEVICE — DGW .035 FC J3MM 150CM TEF: Brand: EMERALD

## (undated) DEVICE — COBAN 4 IN STERILE

## (undated) DEVICE — THE SUPERCROSS MICROCATHETER IS INTENDED TO BE USED IN CONJUNCTION WITH STEERABLE GUIDEWIRES TO ACCESS DISCRETE REGIONS OF THE CORONARY AND/OR PERIPHERAL VASCULATURE. IT MAY BE USED TO FACILITATE PLACEMENT AND EXCHANGE OF GUIDEWIRES AND OTHER INTERVENTIONAL DEVICES AND TO SUBSELECTIVELY INFUSE/DELIVER DIAGNOSTIC AND THERAPEUTIC AGENTS.: Brand: SUPERCROSS™ AT MICROCATHETER

## (undated) DEVICE — RUNTHROUGH NS EXTRA FLOPPY PTCA GUIDEWIRE: Brand: RUNTHROUGH

## (undated) DEVICE — PINNACLE INTRODUCER SHEATH: Brand: PINNACLE

## (undated) DEVICE — SURGICAL GOWN, XL SMARTSLEEVE: Brand: CONVERTORS

## (undated) DEVICE — CUFF TOURNIQUET 30 X 4 IN QUICK CONNECT DISP 1BLA

## (undated) DEVICE — TRAY FOLEY 16FR URIMETER SURESTEP

## (undated) DEVICE — SUT FIBERWIRE #2 1/2 CIRCLE T-5 38IN AR-7200

## (undated) DEVICE — 3M™ TEGADERM™ TRANSPARENT FILM DRESSING FRAME STYLE, 1624W, 2-3/8 IN X 2-3/4 IN (6 CM X 7 CM), 100/CT 4CT/CASE: Brand: 3M™ TEGADERM™

## (undated) DEVICE — MINI TREK CORONARY DILATATION CATHETER 1.50 MM X 8 MM / RAPID-EXCHANGE: Brand: MINI TREK

## (undated) DEVICE — TISSEEL FIBRIN 4ML FROZEN

## (undated) DEVICE — BALLOON IABP 7.5FR 40ML

## (undated) DEVICE — CATH DIAG 6FR IMPULSE 100CM FR4

## (undated) DEVICE — TWIST DRILL 3.2MM DIA 127.0MM LONG

## (undated) DEVICE — CAPIT KNEE ATTUNE FB CEMENT - DEPUY

## (undated) DEVICE — CATH GUIDE LAUNCHER 6FR EBU 3.5

## (undated) DEVICE — ANTIBACTERIAL UNDYED BRAIDED (POLYGLACTIN 910), SYNTHETIC ABSORBABLE SUTURE: Brand: COATED VICRYL

## (undated) DEVICE — COOL TEMP PAD

## (undated) DEVICE — GLOVE INDICATOR PI UNDERGLOVE SZ 7 BLUE

## (undated) DEVICE — MINI TREK CORONARY DILATATION CATHETER 2.0 MM X 15 MM / RAPID-EXCHANGE: Brand: MINI TREK

## (undated) DEVICE — COBAN 6 IN STERILE

## (undated) DEVICE — STOCKINETTE REGULAR

## (undated) DEVICE — GUIDEWIRE WHOLEY HI TORQUE INTERM MOD J.035 260CM

## (undated) DEVICE — PACK TOTAL KNEE PBDS

## (undated) DEVICE — Device: Brand: PROWATER

## (undated) DEVICE — CEMENT MIXING CARTRIDGE PRISM II

## (undated) DEVICE — 3000CC GUARDIAN II: Brand: GUARDIAN

## (undated) DEVICE — 3M™ IOBAN™ 2 ANTIMICROBIAL INCISE DRAPE 6648EZ: Brand: IOBAN™ 2

## (undated) DEVICE — TREK CORONARY DILATATION CATHETER 2.50 MM X 15 MM / RAPID-EXCHANGE: Brand: TREK

## (undated) DEVICE — PADDING CAST 6IN COTTON STRL

## (undated) DEVICE — WEBRIL 6 IN UNSTERILE

## (undated) DEVICE — PROXIMATE SKIN STAPLERS (35 WIDE) CONTAINS 35 STAINLESS STEEL STAPLES (FIXED HEAD): Brand: PROXIMATE

## (undated) DEVICE — CHLORAPREP HI-LITE 26ML ORANGE

## (undated) DEVICE — DRESSING MEPILEX AG BORDER 4 X 12 IN

## (undated) DEVICE — SKIN MARKER DUAL TIP WITH RULER CAP, FLEXIBLE RULER AND LABELS: Brand: DEVON

## (undated) DEVICE — HOOD: Brand: FLYTE, SURGICOOL

## (undated) DEVICE — 3M™ DURAPORE™ SURGICAL TAPE 1538-3, 3 INCH X 10 YARD (7,5CM X 9,1M), 4 ROLLS/BOX: Brand: 3M™ DURAPORE™

## (undated) DEVICE — THROMBECTOMY SET: Brand: ANGIOJET® SPIROFLEX® VG

## (undated) DEVICE — GLOVE SRG BIOGEL 8.5

## (undated) DEVICE — SYRINGE 20ML LL

## (undated) DEVICE — ADHESIVE SKN CLSR HISTOACRYL FLEX 0.5ML LF

## (undated) DEVICE — GLOVE SRG BIOGEL 7

## (undated) DEVICE — INTENDED FOR TISSUE SEPARATION, AND OTHER PROCEDURES THAT REQUIRE A SHARP SURGICAL BLADE TO PUNCTURE OR CUT.: Brand: BARD-PARKER SAFETY BLADES SIZE 10, STERILE

## (undated) DEVICE — SAW BLADE OSCILLATING BRAZOL 167